# Patient Record
Sex: FEMALE | Race: WHITE | NOT HISPANIC OR LATINO | Employment: FULL TIME | URBAN - METROPOLITAN AREA
[De-identification: names, ages, dates, MRNs, and addresses within clinical notes are randomized per-mention and may not be internally consistent; named-entity substitution may affect disease eponyms.]

---

## 2017-01-03 ENCOUNTER — OCCUPATIONAL MEDICINE (OUTPATIENT)
Dept: OCCUPATIONAL MEDICINE | Facility: CLINIC | Age: 37
End: 2017-01-03
Payer: COMMERCIAL

## 2017-01-03 VITALS
DIASTOLIC BLOOD PRESSURE: 82 MMHG | RESPIRATION RATE: 16 BRPM | OXYGEN SATURATION: 94 % | BODY MASS INDEX: 28.25 KG/M2 | HEIGHT: 67 IN | WEIGHT: 180 LBS | HEART RATE: 102 BPM | TEMPERATURE: 98.2 F | SYSTOLIC BLOOD PRESSURE: 106 MMHG

## 2017-01-03 DIAGNOSIS — S40.011D CONTUSION OF RIGHT SHOULDER, SUBSEQUENT ENCOUNTER: ICD-10-CM

## 2017-01-03 DIAGNOSIS — S93.402D SPRAIN OF LEFT ANKLE, UNSPECIFIED LIGAMENT, SUBSEQUENT ENCOUNTER: ICD-10-CM

## 2017-01-03 DIAGNOSIS — S82.839A: ICD-10-CM

## 2017-01-03 PROCEDURE — 99213 OFFICE O/P EST LOW 20 MIN: CPT | Performed by: PREVENTIVE MEDICINE

## 2017-01-03 NOTE — Clinical Note
[facility] [place]   [address] [place] [address],   [address] [address]  Phone: [phone] - Fax: [phone]        Occupational Health [facility] Progress Report and Disability Certification  Date of Service: 1/3/2017   No Show:  No  Date / Time of Next Visit: 1/24/2017@2:20PM   Claim Information   Patient Name: Alondra Huerta  Claim Number:     Employer: [employer]  Date of Injury: 11/15/2016     Insurer / TPA: Workers Choice  ID / SSN:     Occupation: Pharmacy Tech  Diagnosis: Diagnoses of Closed avulsion fracture of distal end of fibula, Sprain of left ankle, unspecified ligament, subsequent encounter, and Contusion of right shoulder, subsequent encounter were pertinent to this visit.    Medical Information   Related to Industrial Injury? Yes    Subjective Complaints:  DOI 11/15/2016: Patient was walking down the hallway when she slipped on the floor that had water/ on it. She twisted her left ankle and fell on her right knee and right arm. X-ray showed small avulsion fracture lateral malleolus. Patient stat  es the pain continues to develop the same. Pain is worse  at the end of the day. Pain continues be worse on the medial aspect of the ankle. She continues to note swelling of the ankle. She continues to work light duty without difficulty.   Objective Findings: Left ankle: Mild swelling diffusely around the ankle. Tenderness to palpation medial aspect of ankle diffusely and lateral malleolus, worse medially. Decreased range of motion with plantar and dorsiflexion, pain with both movements.. Able to ambulate   with antalgic gait.   Pre-Existing Condition(s):     Assessment:   Condition Same    Status: Additional Care Required    Permanent Disability:No    Plan:      Diagnostics:      Comments:  Referral physical therapy  Referral to orthopedics  Continue Ortho boot  Continue restricted duty  Follow-up 3 weeks      Disability Information   Status: Released to Restricted  Duty    From:  1/3/2017  Through: 1/24/2017 Restrictions are: Temporary   Physical Restrictions   Sitting:    Standing:  < or = to 4 hrs/day Stooping:    Bending:      Squatting:    Walking:  < or = to 4 hrs/day Climbing:    Pushing:      Pulling:    Other:    Reaching Above Shoulder (L):   Reaching Above Shoulder (R):       Reaching Below Shoulder (L):    Reaching Below Shoulder (R):      Not to exceed Weight Limits   Carrying(hrs):   Weight Limit(lb):   Lifting(hrs):   Weight  Limit(lb):     Comments: Seated 50% of time. Allow to elevate and ice leg as needed        Repetitive Actions   Hands: i.e. Fine Manipulations from Grasping:     Feet: i.e. Operating Foot Controls:     Driving / Operate Machinery:     Physician Name: Talib Smith Physician Signature: TALIB Ross D.O. e-Signature: Dr. Pablo Marinelli, Medical Director   Clinic Name / Location: [address],   [address] [place] [address] Clinic Phone Number: Dept: [phone]   Appointment Time: 2:20 Pm Visit Start Time: 2:25 PM   Check-In Time:  2:18 Pm Visit Discharge Time:  @2:54PM   Original-Treating Physician or Chiropractor    Page 2-Insurer/TPA    Page 3-Employer    Page 4-Employee

## 2017-01-03 NOTE — MR AVS SNAPSHOT
"        Alondra Gutierrezjosseline   1/3/2017 2:20 PM   Occupational Medicine   MRN: [id number]    Department:  [facility]   Dept Phone:  [phone]    Description:  Female : 1980   Provider:  Talib Smith D.O.           Reason for Visit     Follow-Up room #3 (L) ankle, R arm doi 11/15/16 arm is better, ankle is same      Allergies as of 1/3/2017     Allergen Noted Reactions    Iodine 2011         You were diagnosed with     Closed avulsion fracture of distal end of fibula   [6502055]       Sprain of left ankle, unspecified ligament, subsequent encounter   [[id number]]       Contusion of right shoulder, subsequent encounter   [[id number]]         Vital Signs     Blood Pressure Pulse Temperature Respirations Height Weight    106/82 mmHg 102 36.8 °C (98.2 °F) 16 1.702 m (5' 7\") 81.647 kg (180 lb)    Body Mass Index Oxygen Saturation                28.19 kg/m2 94%          Basic Information     Date Of Birth Sex Race Ethnicity Preferred Language    1980 Female White Non- English      Your appointments     2017  2:20 PM   Workers Compensation with [facility] PROVIDER   [address] [address] [phone]              Health Maintenance        Date Due Completion Dates    IMM DTaP/Tdap/Td Vaccine (1 - Tdap) 3/20/1997 ---    PAP SMEAR 3/20/1999 ---            Current Immunizations     Influenza Vaccine Quad Inj (Pf) 10/25/2016, 10/24/2014    Influenza Vaccine Quad Inj (Preserved) 10/13/2015, 2014    MMR Vaccine 2014, 2014    Tuberculin Skin Test 2014, 2014      Below and/or attached are the medications your provider expects you to take. Review all of your home medications and newly ordered medications with your provider and/or pharmacist. Follow medication instructions as directed by your provider and/or pharmacist. Please keep your medication list with you and share with your provider. Update the " information when medications are discontinued, doses are changed, or new medications (including over-the-counter products) are added; and carry medication information at all times in the event of emergency situations     Allergies:  IODINE - (reactions not documented)               Medications  Valid as of: January 03, 2017 -  5:42 PM    Generic Name Brand Name Tablet Size Instructions for use    Ibuprofen (Cap) Ibuprofen 200 MG Take  by mouth.        .                 Medicines prescribed today were sent to:     [facility] [address] [facility], [place] - [address] [place] [address]    Phone: [phone] Fax: [phone]    Open 24 Hours?: No      Medication refill instructions:       If your prescription bottle indicates you have medication refills left, it is not necessary to call your provider’s office. Please contact your pharmacy and they will refill your medication.    If your prescription bottle indicates you do not have any refills left, you may request refills at any time through one of the following ways: The online [employer] system (except Urgent Care), by calling your provider’s office, or by asking your pharmacy to contact your provider’s office with a refill request. Medication refills are processed only during regular business hours and may not be available until the next business day. Your provider may request additional information or to have a follow-up visit with you prior to refilling your medication.   *Please Note: Medication refills are assigned a new Rx number when refilled electronically. Your pharmacy may indicate that no refills were authorized even though a new prescription for the same medication is available at the pharmacy. Please request the medicine by name with the pharmacy before contacting your provider for a refill.        Referral     A referral request has been sent to our patient care coordination department. Please allow 3-5 business days for us to process this request  and contact you either by phone or mail. If you do not hear from us by the 5th business day, please call us at [phone].           [employer]â„¢ Access Code: Activation code not generated  Current [employer]â„¢ Status: Active

## 2017-01-24 ENCOUNTER — OCCUPATIONAL MEDICINE (OUTPATIENT)
Dept: OCCUPATIONAL MEDICINE | Facility: CLINIC | Age: 37
End: 2017-01-24
Payer: COMMERCIAL

## 2017-01-24 VITALS
HEIGHT: 67 IN | WEIGHT: 180 LBS | SYSTOLIC BLOOD PRESSURE: 114 MMHG | OXYGEN SATURATION: 95 % | BODY MASS INDEX: 28.25 KG/M2 | HEART RATE: 100 BPM | DIASTOLIC BLOOD PRESSURE: 78 MMHG | TEMPERATURE: 97.7 F | RESPIRATION RATE: 16 BRPM

## 2017-01-24 DIAGNOSIS — S82.839A: ICD-10-CM

## 2017-01-24 DIAGNOSIS — S93.402D SPRAIN OF LEFT ANKLE, UNSPECIFIED LIGAMENT, SUBSEQUENT ENCOUNTER: ICD-10-CM

## 2017-01-24 PROCEDURE — 99214 OFFICE O/P EST MOD 30 MIN: CPT | Performed by: PREVENTIVE MEDICINE

## 2017-01-24 RX ORDER — ACETAMINOPHEN 325 MG/1
650 TABLET ORAL EVERY 4 HOURS PRN
COMMUNITY
End: 2019-03-11

## 2017-01-24 NOTE — Clinical Note
[address],   [address] [address]  Phone: [phone] - Fax: [phone]        [facility] Progress Report and Disability Certification  Date of Service: 1/24/2017   No Show:  No  Date / Time of Next Visit: 2/14/2017@2:30 PM   Claim Information   Patient Name: Alondra Huerta  Claim Number:     Employer: [employer]  Date of Injury: 11/15/2016     Insurer / TPA: Workers Choice  ID / SSN:     Occupation: Pharmacy Tech  Diagnosis: Diagnoses of Closed avulsion fracture of distal end of fibula and Sprain of left ankle, unspecified ligament, subsequent encounter were pertinent to this visit.    Medical Information   Related to Industrial Injury? Yes    Subjective Complaints:  DOI 11/15/2016: Patient was walking down the hallway when she slipped on the floor that had water/ on it. She twisted her left ankle and fell on her right knee and right arm. X-ray showed small avulsion fracture lateral malleolus. States that her right arm is improved. However she continues to have pain in her left ankle. The pain continues being more severe on the medial aspect of the ankle. She continues to use the orthopedic boot and has difficulty ambulating without it. Has appointment with orthopedics February 9.   Objective Findings: Left ankle: Minimal swelling diffusely around the ankle. Tenderness to palpation medial aspect of ankle diffusely and lateral malleolus, worse medially. Decreased range of motion with plantar and dorsiflexion, pain with both movements.. Able to ambulate with antalgic gait.   Pre-Existing Condition(s):     Assessment:   Condition Same    Status: Additional Care Required  Permanent Disability:No    Plan:      Diagnostics:      Comments:  Keep appointment for orthopedics  Continue using boot  OTC ibuprofen as needed  Continue restricted duty  Follow-up 3 weeks    Disability Information   Status: Released to Restricted Duty       From:  1/24/2017  Through: 2/14/2017 Restrictions are: Temporary   Physical Restrictions   Sitting:    Standing:  < or = to 4 hrs/day Stooping:    Bending:      Squatting:    Walking:  < or = to 4 hrs/day Climbing:    Pushing:      Pulling:    Other:    Reaching Above Shoulder (L):   Reaching Above Shoulder (R):       Reaching Below Shoulder (L):    Reaching Below Shoulder (R):      Not to exceed Weight Limits   Carrying(hrs):   Weight Limit(lb):   Lifting(hrs):   Weight  Limit(lb):     Comments: Seated 50% of time    Repetitive Actions   Hands: i.e. Fine Manipulations from Grasping:     Feet: i.e. Operating Foot Controls:     Driving / Operate Machinery:     Physician Name: Talib Smith D.O. Physician Signature: TALIB Ross D.O. e-Signature: Dr. Pablo Marinelli, Medical Director   Clinic Name / Location: [address],   [address] [place] [address] Clinic Phone Number: Dept: [phone]   Appointment Time: 2:20 Pm Visit Start Time: 2:25 PM   Check-In Time:  2:21 Pm Visit Discharge Time:  2:46 PM   Original-Treating Physician or Chiropractor    Page 2-Insurer/TPA    Page 3-Employer    Page 4-Employee

## 2017-01-24 NOTE — MR AVS SNAPSHOT
"        Alondra Huerta   2017 2:20 PM   Occupational Medicine   MRN: [id number]    Department:  [facility]   Dept Phone:  [phone]    Description:  Female : 1980   Provider:  Talib Smith D.O.           Reason for Visit     Follow-Up WC DOI 11/15/16 L ankle R arm same Rm 2      Allergies as of 2017     Allergen Noted Reactions    Iodine 2011         You were diagnosed with     Closed avulsion fracture of distal end of fibula   [[id number]]       Sprain of left ankle, unspecified ligament, subsequent encounter   [[id number]]         Vital Signs     Blood Pressure Pulse Temperature Respirations Height Weight    114/78 mmHg 100 36.5 °C (97.7 °F) 16 1.702 m (5' 7\") 81.647 kg (180 lb)    Body Mass Index Oxygen Saturation                28.19 kg/m2 95%          Basic Information     Date Of Birth Sex Race Ethnicity Preferred Language    1980 Female White Non- English      Your appointments     2017  2:30 PM   Workers Compensation with Talib Smith D.O.   [facility] [employer] [address] [address]   [phone]              Health Maintenance        Date Due Completion Dates    IMM DTaP/Tdap/Td Vaccine (1 - Tdap) 3/20/1997 ---    PAP SMEAR 3/20/1999 ---            Current Immunizations     Influenza Vaccine Quad Inj (Pf) 10/25/2016, 10/24/2014    Influenza Vaccine Quad Inj (Preserved) 10/13/2015, 2014    MMR Vaccine 2014, 2014    Tuberculin Skin Test 2014, 2014      Below and/or attached are the medications your provider expects you to take. Review all of your home medications and newly ordered medications with your provider and/or pharmacist. Follow medication instructions as directed by your provider and/or pharmacist. Please keep your medication list with you and share with your provider. Update the information when medications are discontinued, doses are changed, or new medications (including " over-the-counter products) are added; and carry medication information at all times in the event of emergency situations     Allergies:  IODINE - (reactions not documented)               Medications  Valid as of: January 24, 2017 -  2:46 PM    Generic Name Brand Name Tablet Size Instructions for use    Acetaminophen (Tab) TYLENOL 325 MG Take 650 mg by mouth every four hours as needed.        Ibuprofen (Cap) Ibuprofen 200 MG Take  by mouth.        .                 Medicines prescribed today were sent to:     [email or web address] [address] [facility], [place] - [address]    [address] [facility] [place] [address]    Phone: [phone] Fax: [phone]    Open 24 Hours?: No      Medication refill instructions:       If your prescription bottle indicates you have medication refills left, it is not necessary to call your provider’s office. Please contact your pharmacy and they will refill your medication.    If your prescription bottle indicates you do not have any refills left, you may request refills at any time through one of the following ways: The online [employer] system (except Urgent Care), by calling your provider’s office, or by asking your pharmacy to contact your provider’s office with a refill request. Medication refills are processed only during regular business hours and may not be available until the next business day. Your provider may request additional information or to have a follow-up visit with you prior to refilling your medication.   *Please Note: Medication refills are assigned a new Rx number when refilled electronically. Your pharmacy may indicate that no refills were authorized even though a new prescription for the same medication is available at the pharmacy. Please request the medicine by name with the pharmacy before contacting your provider for a refill.           [employer] Access Code: Activation code not generated  Current [employer] Status: Active

## 2017-01-24 NOTE — PROGRESS NOTES
"Subjective:      Alondra Huerta is a 36 y.o. female who presents with Follow-Up      DOI 11/15/2016: Patient was walking down the hallway when she slipped on the floor that had water/ on it. She twisted her left ankle and fell on her right knee and right arm. X-ray showed small avulsion fracture lateral malleolus. States that her right arm is improved. However she continues to have pain in her left ankle. The pain continues being more severe on the medial aspect of the ankle. She continues to use the orthopedic boot and has difficulty ambulating without it. Has appointment with orthopedics February 9.     HPI    ROS  PMH:  has no past medical history on file.  MEDS:   Current outpatient prescriptions:   •  acetaminophen (TYLENOL) 325 MG Tab, Take 650 mg by mouth every four hours as needed., Disp: , Rfl:   •  Ibuprofen 200 MG Cap, Take  by mouth., Disp: , Rfl:   ALLERGIES:   Allergies   Allergen Reactions   • Iodine      SURGHX:   Past Surgical History   Procedure Laterality Date   • Vaginal hysterectomy scope total  5/22/2009     Performed by SHELLY GUNDERSON at [facility]   • Salpingectomy  5/22/2009     Performed by SHELLY GUNDERSON at [facility]     SOCHX:    FH: family history is not on file.       Objective:     /78 mmHg  Pulse 100  Temp(Src) 36.5 °C (97.7 °F)  Resp 16  Ht 1.702 m (5' 7\")  Wt 81.647 kg (180 lb)  BMI 28.19 kg/m2  SpO2 95%     Physical Exam   Constitutional: She appears well-developed and well-nourished.   Cardiovascular: Normal rate.    Pulmonary/Chest: Effort normal.   Psychiatric: She has a normal mood and affect. Her behavior is normal.       Left ankle: Minimal swelling diffusely around the ankle. Tenderness to palpation medial aspect of ankle diffusely and lateral malleolus, worse medially. Decreased range of motion with plantar and dorsiflexion, pain with both movements.. Able to ambulate with antalgic gait.     "   Assessment/Plan:     1. Closed avulsion fracture of distal end of fibula    2. Sprain of left ankle, unspecified ligament, subsequent encounter  Keep appointment for orthopedics  Continue using boot  OTC ibuprofen as needed  Continue restricted duty  Follow-up 3 weeks

## 2017-03-03 ENCOUNTER — HOSPITAL ENCOUNTER (OUTPATIENT)
Dept: RADIOLOGY | Facility: MEDICAL CENTER | Age: 37
End: 2017-03-03
Attending: ORTHOPAEDIC SURGERY
Payer: COMMERCIAL

## 2017-03-03 DIAGNOSIS — M76.822 POSTERIOR TIBIAL TENDINITIS OF LEFT LEG: ICD-10-CM

## 2017-03-03 DIAGNOSIS — S93.492A TEAR OF TALOFIBULAR LIGAMENT OF LEFT LOWER EXTREMITY, INITIAL ENCOUNTER: ICD-10-CM

## 2017-03-03 PROCEDURE — 73721 MRI JNT OF LWR EXTRE W/O DYE: CPT | Mod: LT

## 2017-03-14 ENCOUNTER — HOSPITAL ENCOUNTER (OUTPATIENT)
Dept: PHYSICAL THERAPY | Facility: REHABILITATION | Age: 37
End: 2017-03-14
Attending: PREVENTIVE MEDICINE
Payer: COMMERCIAL

## 2017-03-14 PROCEDURE — 97161 PT EVAL LOW COMPLEX 20 MIN: CPT

## 2017-03-14 PROCEDURE — 97140 MANUAL THERAPY 1/> REGIONS: CPT

## 2017-03-14 PROCEDURE — 97014 ELECTRIC STIMULATION THERAPY: CPT

## 2017-03-16 ENCOUNTER — HOSPITAL ENCOUNTER (OUTPATIENT)
Dept: PHYSICAL THERAPY | Facility: REHABILITATION | Age: 37
End: 2017-03-16
Attending: PREVENTIVE MEDICINE
Payer: COMMERCIAL

## 2017-03-16 PROCEDURE — 97110 THERAPEUTIC EXERCISES: CPT

## 2017-03-16 PROCEDURE — 97140 MANUAL THERAPY 1/> REGIONS: CPT

## 2017-03-16 PROCEDURE — 97014 ELECTRIC STIMULATION THERAPY: CPT

## 2017-03-20 ENCOUNTER — HOSPITAL ENCOUNTER (OUTPATIENT)
Dept: PHYSICAL THERAPY | Facility: REHABILITATION | Age: 37
End: 2017-03-20
Attending: PREVENTIVE MEDICINE
Payer: COMMERCIAL

## 2017-03-20 PROCEDURE — 97110 THERAPEUTIC EXERCISES: CPT

## 2017-03-20 PROCEDURE — 97140 MANUAL THERAPY 1/> REGIONS: CPT

## 2017-03-20 PROCEDURE — 97014 ELECTRIC STIMULATION THERAPY: CPT

## 2017-03-22 ENCOUNTER — HOSPITAL ENCOUNTER (OUTPATIENT)
Dept: PHYSICAL THERAPY | Facility: REHABILITATION | Age: 37
End: 2017-03-22
Attending: PREVENTIVE MEDICINE
Payer: COMMERCIAL

## 2017-03-22 PROCEDURE — 97140 MANUAL THERAPY 1/> REGIONS: CPT

## 2017-03-22 PROCEDURE — 97014 ELECTRIC STIMULATION THERAPY: CPT

## 2017-03-22 PROCEDURE — 97110 THERAPEUTIC EXERCISES: CPT

## 2017-03-27 ENCOUNTER — HOSPITAL ENCOUNTER (OUTPATIENT)
Dept: PHYSICAL THERAPY | Facility: REHABILITATION | Age: 37
End: 2017-03-27
Attending: PREVENTIVE MEDICINE
Payer: COMMERCIAL

## 2017-03-27 PROCEDURE — 97110 THERAPEUTIC EXERCISES: CPT

## 2017-03-27 PROCEDURE — 97014 ELECTRIC STIMULATION THERAPY: CPT

## 2017-03-30 ENCOUNTER — HOSPITAL ENCOUNTER (OUTPATIENT)
Dept: PHYSICAL THERAPY | Facility: REHABILITATION | Age: 37
End: 2017-03-30
Attending: PREVENTIVE MEDICINE
Payer: COMMERCIAL

## 2017-03-30 PROCEDURE — 97110 THERAPEUTIC EXERCISES: CPT

## 2017-03-30 PROCEDURE — 97140 MANUAL THERAPY 1/> REGIONS: CPT

## 2017-04-06 ENCOUNTER — HOSPITAL ENCOUNTER (OUTPATIENT)
Dept: PHYSICAL THERAPY | Facility: REHABILITATION | Age: 37
End: 2017-04-06
Attending: PREVENTIVE MEDICINE
Payer: COMMERCIAL

## 2017-04-06 PROCEDURE — 97014 ELECTRIC STIMULATION THERAPY: CPT

## 2017-04-06 PROCEDURE — 97035 APP MDLTY 1+ULTRASOUND EA 15: CPT

## 2017-04-06 PROCEDURE — 97110 THERAPEUTIC EXERCISES: CPT

## 2017-04-13 ENCOUNTER — HOSPITAL ENCOUNTER (OUTPATIENT)
Dept: PHYSICAL THERAPY | Facility: REHABILITATION | Age: 37
End: 2017-04-13
Attending: PREVENTIVE MEDICINE
Payer: COMMERCIAL

## 2017-04-13 PROCEDURE — 97014 ELECTRIC STIMULATION THERAPY: CPT

## 2017-04-13 PROCEDURE — 97110 THERAPEUTIC EXERCISES: CPT

## 2017-04-18 ENCOUNTER — HOSPITAL ENCOUNTER (OUTPATIENT)
Dept: PHYSICAL THERAPY | Facility: REHABILITATION | Age: 37
End: 2017-04-18
Attending: PREVENTIVE MEDICINE
Payer: COMMERCIAL

## 2017-04-18 PROCEDURE — 97014 ELECTRIC STIMULATION THERAPY: CPT

## 2017-04-18 PROCEDURE — 97140 MANUAL THERAPY 1/> REGIONS: CPT

## 2017-04-18 PROCEDURE — 97110 THERAPEUTIC EXERCISES: CPT

## 2017-04-20 ENCOUNTER — HOSPITAL ENCOUNTER (OUTPATIENT)
Dept: PHYSICAL THERAPY | Facility: REHABILITATION | Age: 37
End: 2017-04-20
Attending: PREVENTIVE MEDICINE
Payer: COMMERCIAL

## 2017-04-20 PROCEDURE — 97140 MANUAL THERAPY 1/> REGIONS: CPT

## 2017-04-20 PROCEDURE — 97110 THERAPEUTIC EXERCISES: CPT

## 2017-04-20 PROCEDURE — 97014 ELECTRIC STIMULATION THERAPY: CPT

## 2017-04-24 ENCOUNTER — APPOINTMENT (OUTPATIENT)
Dept: PHYSICAL THERAPY | Facility: REHABILITATION | Age: 37
End: 2017-04-24
Attending: PREVENTIVE MEDICINE
Payer: COMMERCIAL

## 2017-04-27 ENCOUNTER — APPOINTMENT (OUTPATIENT)
Dept: PHYSICAL THERAPY | Facility: REHABILITATION | Age: 37
End: 2017-04-27
Attending: PREVENTIVE MEDICINE
Payer: COMMERCIAL

## 2017-05-11 ENCOUNTER — APPOINTMENT (OUTPATIENT)
Dept: ADMISSIONS | Facility: MEDICAL CENTER | Age: 37
End: 2017-05-11
Attending: ORTHOPAEDIC SURGERY
Payer: COMMERCIAL

## 2017-05-11 RX ORDER — LORATADINE 10 MG/1
10 TABLET ORAL
COMMUNITY
End: 2019-03-11

## 2017-05-11 RX ORDER — FLUTICASONE PROPIONATE 50 MCG
1 SPRAY, SUSPENSION (ML) NASAL PRN
COMMUNITY
End: 2019-03-11

## 2017-05-15 ENCOUNTER — HOSPITAL ENCOUNTER (OUTPATIENT)
Facility: MEDICAL CENTER | Age: 37
End: 2017-05-15
Attending: ORTHOPAEDIC SURGERY | Admitting: ORTHOPAEDIC SURGERY
Payer: COMMERCIAL

## 2017-05-15 VITALS
HEIGHT: 67 IN | BODY MASS INDEX: 28.17 KG/M2 | HEART RATE: 87 BPM | RESPIRATION RATE: 14 BRPM | OXYGEN SATURATION: 94 % | TEMPERATURE: 98.2 F | WEIGHT: 179.45 LBS

## 2017-05-15 PROBLEM — M76.829 TIBIALIS POSTERIOR TENDONITIS: Status: ACTIVE | Noted: 2017-05-15

## 2017-05-15 PROBLEM — S93.402A SPRAIN OF LEFT ANKLE: Status: ACTIVE | Noted: 2017-05-15

## 2017-05-15 PROCEDURE — 160002 HCHG RECOVERY MINUTES (STAT): Performed by: ORTHOPAEDIC SURGERY

## 2017-05-15 PROCEDURE — 501838 HCHG SUTURE GENERAL: Performed by: ORTHOPAEDIC SURGERY

## 2017-05-15 PROCEDURE — 500881 HCHG PACK, EXTREMITY: Performed by: ORTHOPAEDIC SURGERY

## 2017-05-15 PROCEDURE — 160029 HCHG SURGERY MINUTES - 1ST 30 MINS LEVEL 4: Performed by: ORTHOPAEDIC SURGERY

## 2017-05-15 PROCEDURE — 160046 HCHG PACU - 1ST 60 MINS PHASE II: Performed by: ORTHOPAEDIC SURGERY

## 2017-05-15 PROCEDURE — 160022 HCHG BLOCK: Performed by: ORTHOPAEDIC SURGERY

## 2017-05-15 PROCEDURE — 160035 HCHG PACU - 1ST 60 MINS PHASE I: Performed by: ORTHOPAEDIC SURGERY

## 2017-05-15 PROCEDURE — A6402 STERILE GAUZE <= 16 SQ IN: HCPCS | Performed by: ORTHOPAEDIC SURGERY

## 2017-05-15 PROCEDURE — 700101 HCHG RX REV CODE 250

## 2017-05-15 PROCEDURE — 160009 HCHG ANES TIME/MIN: Performed by: ORTHOPAEDIC SURGERY

## 2017-05-15 PROCEDURE — 700102 HCHG RX REV CODE 250 W/ 637 OVERRIDE(OP)

## 2017-05-15 PROCEDURE — A6454 SELF-ADHER BAND W>=3" <5"/YD: HCPCS | Performed by: ORTHOPAEDIC SURGERY

## 2017-05-15 PROCEDURE — 500088 HCHG BLADE, SAGITTAL: Performed by: ORTHOPAEDIC SURGERY

## 2017-05-15 PROCEDURE — 160048 HCHG OR STATISTICAL LEVEL 1-5: Performed by: ORTHOPAEDIC SURGERY

## 2017-05-15 PROCEDURE — 502240 HCHG MISC OR SUPPLY RC 0272: Performed by: ORTHOPAEDIC SURGERY

## 2017-05-15 PROCEDURE — 160025 RECOVERY II MINUTES (STATS): Performed by: ORTHOPAEDIC SURGERY

## 2017-05-15 PROCEDURE — 501336 HCHG SHAVER: Performed by: ORTHOPAEDIC SURGERY

## 2017-05-15 PROCEDURE — 700111 HCHG RX REV CODE 636 W/ 250 OVERRIDE (IP)

## 2017-05-15 PROCEDURE — 160041 HCHG SURGERY MINUTES - EA ADDL 1 MIN LEVEL 4: Performed by: ORTHOPAEDIC SURGERY

## 2017-05-15 PROCEDURE — 160036 HCHG PACU - EA ADDL 30 MINS PHASE I: Performed by: ORTHOPAEDIC SURGERY

## 2017-05-15 PROCEDURE — A6223 GAUZE >16<=48 NO W/SAL W/O B: HCPCS | Performed by: ORTHOPAEDIC SURGERY

## 2017-05-15 PROCEDURE — 160047 HCHG PACU  - EA ADDL 30 MINS PHASE II: Performed by: ORTHOPAEDIC SURGERY

## 2017-05-15 PROCEDURE — 500423 HCHG DRESSING, ABD COMBINE: Performed by: ORTHOPAEDIC SURGERY

## 2017-05-15 PROCEDURE — 500054 HCHG BANDAGE, ELASTIC 6: Performed by: ORTHOPAEDIC SURGERY

## 2017-05-15 PROCEDURE — A9270 NON-COVERED ITEM OR SERVICE: HCPCS

## 2017-05-15 PROCEDURE — 502000 HCHG MISC OR IMPLANTS RC 0278: Performed by: ORTHOPAEDIC SURGERY

## 2017-05-15 DEVICE — SUTURE ANCHOR TWINFIX 3.5 WITH NEEDLES SMALL JOINT: Type: IMPLANTABLE DEVICE | Status: FUNCTIONAL

## 2017-05-15 RX ORDER — LIDOCAINE AND PRILOCAINE 25; 25 MG/G; MG/G
1 CREAM TOPICAL
Status: DISCONTINUED | OUTPATIENT
Start: 2017-05-15 | End: 2017-05-15

## 2017-05-15 RX ORDER — LIDOCAINE HYDROCHLORIDE 10 MG/ML
0.5 INJECTION, SOLUTION INFILTRATION; PERINEURAL
Status: DISCONTINUED | OUTPATIENT
Start: 2017-05-15 | End: 2017-05-15

## 2017-05-15 RX ORDER — LIDOCAINE HYDROCHLORIDE 10 MG/ML
0.5 INJECTION, SOLUTION INFILTRATION; PERINEURAL
Status: COMPLETED | OUTPATIENT
Start: 2017-05-15 | End: 2017-05-15

## 2017-05-15 RX ORDER — MAGNESIUM HYDROXIDE 1200 MG/15ML
LIQUID ORAL
Status: DISCONTINUED | OUTPATIENT
Start: 2017-05-15 | End: 2017-05-15 | Stop reason: HOSPADM

## 2017-05-15 RX ORDER — MEPERIDINE HYDROCHLORIDE 25 MG/ML
INJECTION INTRAMUSCULAR; INTRAVENOUS; SUBCUTANEOUS
Status: COMPLETED
Start: 2017-05-15 | End: 2017-05-15

## 2017-05-15 RX ORDER — SODIUM CHLORIDE, SODIUM LACTATE, POTASSIUM CHLORIDE, CALCIUM CHLORIDE 600; 310; 30; 20 MG/100ML; MG/100ML; MG/100ML; MG/100ML
INJECTION, SOLUTION INTRAVENOUS CONTINUOUS
Status: DISCONTINUED | OUTPATIENT
Start: 2017-05-15 | End: 2017-05-15

## 2017-05-15 RX ORDER — LIDOCAINE HYDROCHLORIDE 10 MG/ML
INJECTION, SOLUTION INFILTRATION; PERINEURAL
Status: COMPLETED
Start: 2017-05-15 | End: 2017-05-15

## 2017-05-15 RX ORDER — SODIUM CHLORIDE, SODIUM LACTATE, POTASSIUM CHLORIDE, CALCIUM CHLORIDE 600; 310; 30; 20 MG/100ML; MG/100ML; MG/100ML; MG/100ML
INJECTION, SOLUTION INTRAVENOUS CONTINUOUS
Status: DISCONTINUED | OUTPATIENT
Start: 2017-05-15 | End: 2017-05-15 | Stop reason: HOSPADM

## 2017-05-15 RX ORDER — OXYCODONE HCL 5 MG/5 ML
SOLUTION, ORAL ORAL
Status: COMPLETED
Start: 2017-05-15 | End: 2017-05-15

## 2017-05-15 RX ORDER — LIDOCAINE AND PRILOCAINE 25; 25 MG/G; MG/G
1 CREAM TOPICAL
Status: COMPLETED | OUTPATIENT
Start: 2017-05-15 | End: 2017-05-15

## 2017-05-15 RX ADMIN — LIDOCAINE HYDROCHLORIDE 0.5 ML: 10 INJECTION, SOLUTION INFILTRATION; PERINEURAL at 06:16

## 2017-05-15 RX ADMIN — SODIUM CHLORIDE, SODIUM LACTATE, POTASSIUM CHLORIDE, CALCIUM CHLORIDE: 600; 310; 30; 20 INJECTION, SOLUTION INTRAVENOUS at 06:16

## 2017-05-15 RX ADMIN — MEPERIDINE HYDROCHLORIDE 12.5 MG: 25 INJECTION INTRAMUSCULAR; INTRAVENOUS; SUBCUTANEOUS at 08:21

## 2017-05-15 RX ADMIN — OXYCODONE HYDROCHLORIDE 5 MG: 5 SOLUTION ORAL at 08:22

## 2017-05-15 ASSESSMENT — PAIN SCALES - GENERAL
PAINLEVEL_OUTOF10: 0
PAINLEVEL_OUTOF10: 6
PAINLEVEL_OUTOF10: 0

## 2017-05-15 NOTE — IP AVS SNAPSHOT
5/15/2017    Alondra Huerta  [address] [facility] [address]    Dear Alondra:    [facility] wants to ensure your discharge home is safe and you or your loved ones have had all of your questions answered regarding your care after you leave the hospital.    Below is a list of resources and contact information should you have any questions regarding your hospital stay, follow-up instructions, or active medical symptoms.    Questions or Concerns Regarding… Contact   Medical Questions Related to Your Discharge  (7 days a week, 8am-5pm) Contact a Nurse Care Coordinator   [phone]   Medical Questions Not Related to Your Discharge  (24 hours a day / 7 days a week)  Contact the Nurse Health Line   [phone]    Medications or Discharge Instructions Refer to your discharge packet   or contact your [facility] Primary Care Provider   [phone]   Follow-up Appointment(s) Schedule your appointment via [employer]   or contact Scheduling [phone]   Billing Review your statement via [employer]  or contact Billing [phone]   Medical Records Review your records via [employer]   or contact Medical Records [phone]     You may receive a telephone call within two days of discharge. This call is to make certain you understand your discharge instructions and have the opportunity to have any questions answered. You can also easily access your medical information, test results and upcoming appointments via the [employer] free online health management tool. You can learn more and sign up at [employer]lu[employer]. For assistance setting up your [employer] account, please call [phone].    Once again, we want to ensure your discharge home is safe and that you have a clear understanding of any next steps in your care. If you have any questions or concerns, please do not hesitate to contact us, we are here for you. Thank you for choosing [facility] for your healthcare needs.    Sincerely,    Your [facility] Healthcare Team

## 2017-05-15 NOTE — OP REPORT
DATE OF SERVICE:  05/15/2017    DATE OF OPERATION:  05/15/2017    PREOPERATIVE DIAGNOSES:  1.  Left ankle arthrofibrosis.  2.  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    POSTOPERATIVE DIAGNOSES:  1.  Left ankle arthrofibrosis.  2.  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    PROCEDURES PERFORMED:  1.  Left ankle arthroscopic extensive debridement.  2.  Left secondary lateral ligament reconstruction.    SURGEON:  Vaibhav Casper MD    FIRST ASSISTANT:  Zoraida Falk MD    SECOND ASSISTANT:  Dian Mccann.    ANESTHESIA:  General endotracheal with popliteal block per request for   postoperative pain management.    ESTIMATED BLOOD LOSS:  None.    COMPLICATIONS:  None.    POSTOPERATIVE PLAN:  1.  Weightbearing as tolerated.  2.  Preoperative antibiotics.  3.  Follow up in 2 weeks.    INDICATIONS:  The patient is a pleasant 37-year-old female who has had   problems with her left ankle.  This is an industrial injury.  The above   diagnoses made and options were discussed with her including operative and   nonoperative.  She elected to undergo operative intervention.  The above   procedure discussed and all questions were answered.  Risks of surgery   explained, which include but not limited to wound problems, infection, nerve   injury, vascular injury, need for further surgery.  She understands she could   have persistent risk for pain, malunion, nonunion, need for further surgery.    She understands and accepts these risks and agrees to proceed.  Site was   marked by myself prior to receiving psychotropic medicines.    PROCEDURE IN DETAIL:  The patient was given a popliteal block per request for   postoperative pain management.  She was brought into the operating room and   underwent general endotracheal anesthesia without complications.  Her left   lower extremity was prepped and draped in standard fashion.  Positive site   verification confirmed her left lower  extremity as well as procedure and   confirmation that she received preoperative antibiotics.  Esmarch was used to   exsanguinate her foot and ankle and leg tourniquet was inflated to 250 mmHg.    An 18-gauge needle was placed medial to the preoperative marked tibialis   anterior above the joint line.  Ankle was insufflated with 10 mL of normal   saline.  A 15 blade was used to make full thickness arthrotomy and a 4.0 scope   was introduced.  An 18-gauge needle was then placed lateral to the   preoperative peroneus tertius at the level of the joint line.  Skin incision   was made and blunt dissection made down into the joint.  Shaver was   introduced.  It was debrided out the anterolateral corner.  She has some   extensive arthrofibrosis, synovitis and some plica.  This was debrided out.    All instruments were removed.  The scope was then placed in the lateral   portal.  Shaver was introduced in the anteromedial corner.  This debrided this   out as well as some fibrous tissue that was down in the medial gutter.  Probe   palpation of the entire cartilage demonstrated no loose bodies, no cartilage   defects.  She had normal cartilage.  All instruments were removed.  Scope   portals were closed with interrupted nylon suture.  A sterile Tegaderm was   placed.  Next, she was repositioned, prepped and draped in the lateral   position, left side up.  A curvilinear incision was made at the distal aspect   of the fibula, carefully dissected down to the fibular periosteum.  The   peroneal tendon sheath was opened up just distal to the SPR, exposing the   peroneal tendons.  These were retracted posteriorly.  CFL ligament was then   released off its superior and inferior margins directed off of the fibula.    The ATFL was then identified and released off its superior and inferior   margins directed off of the fibula.  Fibular periosteum was elevated up and   rongeur was used to rongeur down to good subchondral bone.  A 3.5  corkscrew   anchor was placed at the junction of the ATFL and CFL ligament.  One pair of   suture was brought to the ATFL ligament, the other was brought to the CFL   ligament.  The foot was then held in plantar flexion, eversion, and CFL   ligament was pretensioned and tied.  The foot was then brought into   dorsiflexion, eversion and the ATFL ligament was pretensioned and tied.  The   fibular periosteum was then repaired to the extensor retinaculum with multiple   0 Vicryls in a mattress type fashion for secondary repair.  The patient had   excellent stability.  Wounds were irrigated with copious irrigation and closed   in layered fashion using 3-0 Vicryls and 3-0 nylon.  Sterile dressing was   applied.  Tourniquet was released.  All toes were pink.  The patient was   transferred to the recovery room in good condition.    Utilization of Dr. Falk was necessary for patient positioning, holding,   retracting, provisional and definitive fixation, wound closure, dressing and   splint placement.  She was present throughout the entire procedure.       ____________________________________     MD DANYELLE MANZANO / NTS    DD:  05/15/2017 11:02:02  DT:  05/15/2017 13:38:00    D#:  [id number]  Job#:  [id number]    cc: [facility]

## 2017-05-15 NOTE — OR NURSING
Message left for pt's  regarding update and plan of care, called reception and they will update him and alert him to message.  Spoke with Dr. Casper regarding brace noted in Discharge Instructions, he said that pt can walk on current dressing and that brace will be given on follow-up appointment.

## 2017-05-15 NOTE — IP AVS SNAPSHOT
" Home Care Instructions                                                                                                                Name:Alondra Huerta  Medical Record Number:[id number]  CSN: [id number]    YOB: 1980   Age: 37 y.o.  Sex: female  HT:1.702 m (5' 7\") WT: 81.4 kg (179 lb 7.3 oz)          Admit Date: 5/15/2017     Discharge Date:   Today's Date: 5/15/2017  Attending Doctor:  Vaibhav Casper M.D.                  Allergies:  Iodine and Shellfish allergy              Follow-up Information     1. Follow up with Vaibhav Casper M.D. In 2 weeks.    Specialty:  Orthopaedics    Contact information    [address]  [address]  [facility] [address]  [phone]          Discharge Instructions         ACTIVITY: Rest and take it easy for the first 24 hours.  A responsible adult is recommended to remain with you during that time.  It is normal to feel sleepy.  We encourage you to not do anything that requires balance, judgment or coordination.    MILD FLU-LIKE SYMPTOMS ARE NORMAL. YOU MAY EXPERIENCE GENERALIZED MUSCLE ACHES, THROAT IRRITATION, HEADACHE AND/OR SOME NAUSEA.    FOR 24 HOURS DO NOT:  Drive, operate machinery or run household appliances.  Drink beer or alcoholic beverages.   Make important decisions or sign legal documents.    SPECIAL INSTRUCTIONS:     Elevate/ice surgical leg  Take pain medications scheduled until block wears off. Hold for sedation.   Rewrap ace looser 6-8 hours post op   Weightbearing as tolerated in brace            DIET: To avoid nausea, slowly advance diet as tolerated, avoiding spicy or greasy foods for the first day.  Add more substantial food to your diet according to your physician's instructions.  Babies can be fed formula or breast milk as soon as they are hungry.  INCREASE FLUIDS AND FIBER TO AVOID CONSTIPATION.    SURGICAL DRESSING/BATHING: *follow as directed by **    FOLLOW-UP APPOINTMENT:  A follow-up appointment should be arranged with your doctor as " scheduled.    You should CALL YOUR PHYSICIAN if you develop:  Fever greater than 101 degrees F.  Pain not relieved by medication, or persistent nausea or vomiting.  Excessive bleeding (blood soaking through dressing) or unexpected drainage from the wound.  Extreme redness or swelling around the incision site, drainage of pus or foul smelling drainage.  Inability to urinate or empty your bladder within 8 hours.  Problems with breathing or chest pain.    You should call 911 if you develop problems with breathing or chest pain.  If you are unable to contact your doctor or surgical center, you should go to the nearest emergency room or urgent care center.  Physician's telephone #: *[phone]*    If any questions arise, call your doctor.  If your doctor is not available, please feel free to call the Surgical Center at [phone].  The Center is open Monday through Friday from 7AM to 7PM.  You can also call the [employer] HOTLINE open 24 hours/day, 7 days/week and speak to a nurse at [phone], or toll free at [phone].    A registered nurse may call you a few days after your surgery to see how you are doing after your procedure.    MEDICATIONS: Resume taking daily medication.  Take prescribed pain medication with food.  If no medication is prescribed, you may take non-aspirin pain medication if needed.  PAIN MEDICATION CAN BE VERY CONSTIPATING.  Take a stool softener or laxative such as senokot, pericolace, or milk of magnesia if needed.    Patient has pain prescription filled and at home.  Last pain medication given at *8:22 am*.    If your physician has prescribed pain medication that includes Acetaminophen (Tylenol), do not take additional Acetaminophen (Tylenol) while taking the prescribed medication.    Depression / Suicide Risk    As you are discharged from this [facility] facility, it is important to learn how to keep safe from harming yourself.    Recognize the warning signs:  · Abrupt changes in  personality, positive or negative- including increase in energy   · Giving away possessions  · Change in eating patterns- significant weight changes-  positive or negative  · Change in sleeping patterns- unable to sleep or sleeping all the time   · Unwillingness or inability to communicate  · Depression  · Unusual sadness, discouragement and loneliness  · Talk of wanting to die  · Neglect of personal appearance   · Rebelliousness- reckless behavior  · Withdrawal from people/activities they love  · Confusion- inability to concentrate     If you or a loved one observes any of these behaviors or has concerns about self-harm, here's what you can do:  · Talk about it- your feelings and reasons for harming yourself  · Remove any means that you might use to hurt yourself (examples: pills, rope, extension cords, firearm)  · Get professional help from the community (Mental Health, Substance Abuse, psychological counseling)  · Do not be alone:Call your Safe Contact- someone whom you trust who will be there for you.  · Call your local CRISIS HOTLINE 141-2394 or 981-017-1973  · Call your local Children's Mobile Crisis Response Team [place] (198) 475-3278 or www.[employer]  · Call the toll free National Suicide Prevention Hotlines   · National Suicide Prevention Lifeline 885-412-LTDQ (6365)  · National Hope Line Network 800-SUICIDE (880-3848)       Medication List      CONTINUE taking these medications        Instructions    Morning Afternoon Evening Bedtime    asa/apap/caffeine 250-250-65 MG Tabs   Commonly known as:  EXCEDRIN        Take 1 Tab by mouth every 6 hours as needed for Headache.   Dose:  1 Tab                        fluticasone 50 MCG/ACT nasal spray   Commonly known as:  FLONASE        Spray 1 Spray in nose as needed.   Dose:  1 Spray                        Ibuprofen 200 MG Caps        Take 200 mg by mouth as needed.   Dose:  200 mg                        loratadine 10 MG Tabs   Commonly known as:   CLARITIN        Take 10 mg by mouth 1 time daily as needed.   Dose:  10 mg                        TYLENOL 325 MG Tabs   Generic drug:  acetaminophen        Take 650 mg by mouth every four hours as needed.   Dose:  650 mg                                Medication Information     Above and/or attached are the medications your physician expects you to take upon discharge. Review all of your home medications and newly ordered medications with your doctor and/or pharmacist. Follow medication instructions as directed by your doctor and/or pharmacist. Please keep your medication list with you and share with your physician. Update the information when medications are discontinued, doses are changed, or new medications (including over-the-counter products) are added; and carry medication information at all times in the event of emergency situations.        Resources     Quit Smoking / Tobacco Use:    I understand the use of any tobacco products increases my chance of suffering from future heart disease or stroke and could cause other illnesses which may shorten my life. Quitting the use of tobacco products is the single most important thing I can do to improve my health. For further information on smoking / tobacco cessation call a Toll Free Quit Line at 1-900.773.6431 (*National [employer] [place]) or 1-141.488.4726 (American Lung Association) or you can access the web based program at www.lungusa.org.    [place] Tobacco Users Help Line:  (813) 708-8297       Toll Free: 1-232.317.1757  Quit Tobacco Program [facility] (945)743-7329    Crisis Hotline:    [place] Crisis Hotline:  8-141-YHUYKTS or 1-206.464.4131    [place] Crisis Hotline:    1-562.623.3740 or 020-200-4322    Discharge Survey:   Thank you for choosing [facility]. We hope we did everything we could to make your hospital stay a pleasant one. You may be receiving a survey and we would appreciate your time and participation in answering the  questions. Your input is very valuable to us in our efforts to improve our service to our patients and their families.            Signatures     My signature on this form indicates that:    1. I acknowledge receipt and understanding of these Home Care Instruction.  2. My questions regarding this information have been answered to my satisfaction.  3. I have formulated a plan with my discharge nurse to obtain my prescribed medications for home.    __________________________________      __________________________________                   Patient Signature                                 Guardian/Responsible Adult Signature      __________________________________                 __________       ________                       Nurse Signature                                               Date                 Time

## 2017-05-15 NOTE — DISCHARGE INSTRUCTIONS
ACTIVITY: Rest and take it easy for the first 24 hours.  A responsible adult is recommended to remain with you during that time.  It is normal to feel sleepy.  We encourage you to not do anything that requires balance, judgment or coordination.    MILD FLU-LIKE SYMPTOMS ARE NORMAL. YOU MAY EXPERIENCE GENERALIZED MUSCLE ACHES, THROAT IRRITATION, HEADACHE AND/OR SOME NAUSEA.    FOR 24 HOURS DO NOT:  Drive, operate machinery or run household appliances.  Drink beer or alcoholic beverages.   Make important decisions or sign legal documents.    SPECIAL INSTRUCTIONS:     Elevate/ice surgical leg  Take pain medications scheduled until block wears off. Hold for sedation.   Rewrap ace looser 6-8 hours post op   Weightbearing as tolerated in brace            DIET: To avoid nausea, slowly advance diet as tolerated, avoiding spicy or greasy foods for the first day.  Add more substantial food to your diet according to your physician's instructions.  Babies can be fed formula or breast milk as soon as they are hungry.  INCREASE FLUIDS AND FIBER TO AVOID CONSTIPATION.    SURGICAL DRESSING/BATHING: *follow as directed by **    FOLLOW-UP APPOINTMENT:  A follow-up appointment should be arranged with your doctor as scheduled.    You should CALL YOUR PHYSICIAN if you develop:  Fever greater than 101 degrees F.  Pain not relieved by medication, or persistent nausea or vomiting.  Excessive bleeding (blood soaking through dressing) or unexpected drainage from the wound.  Extreme redness or swelling around the incision site, drainage of pus or foul smelling drainage.  Inability to urinate or empty your bladder within 8 hours.  Problems with breathing or chest pain.    You should call 911 if you develop problems with breathing or chest pain.  If you are unable to contact your doctor or surgical center, you should go to the nearest emergency room or urgent care center.  Physician's telephone #: *[phone]*    If any questions arise,  call your doctor.  If your doctor is not available, please feel free to call the Surgical Center at [phone].  The Center is open Monday through Friday from 7AM to 7PM.  You can also call the HEALTH HOTLINE open 24 hours/day, 7 days/week and speak to a nurse at [phone], or toll free at [phone].    A registered nurse may call you a few days after your surgery to see how you are doing after your procedure.    MEDICATIONS: Resume taking daily medication.  Take prescribed pain medication with food.  If no medication is prescribed, you may take non-aspirin pain medication if needed.  PAIN MEDICATION CAN BE VERY CONSTIPATING.  Take a stool softener or laxative such as senokot, pericolace, or milk of magnesia if needed.    Patient has pain prescription filled and at home.  Last pain medication given at *8:22 am*.    If your physician has prescribed pain medication that includes Acetaminophen (Tylenol), do not take additional Acetaminophen (Tylenol) while taking the prescribed medication.    Depression / Suicide Risk    As you are discharged from this [facility] facility, it is important to learn how to keep safe from harming yourself.    Recognize the warning signs:  · Abrupt changes in personality, positive or negative- including increase in energy   · Giving away possessions  · Change in eating patterns- significant weight changes-  positive or negative  · Change in sleeping patterns- unable to sleep or sleeping all the time   · Unwillingness or inability to communicate  · Depression  · Unusual sadness, discouragement and loneliness  · Talk of wanting to die  · Neglect of personal appearance   · Rebelliousness- reckless behavior  · Withdrawal from people/activities they love  · Confusion- inability to concentrate     If you or a loved one observes any of these behaviors or has concerns about self-harm, here's what you can do:  · Talk about it- your feelings and reasons for harming yourself  · Remove  any means that you might use to hurt yourself (examples: pills, rope, extension cords, firearm)  · Get professional help from the community (Mental Health, Substance Abuse, psychological counseling)  · Do not be alone:Call your Safe Contact- someone whom you trust who will be there for you.  · Call your local CRISIS HOTLINE 809-1292 or 399-254-9479  · Call your local Children's Mobile Crisis Response Team [place] (803) 798-5634 or www.[employer]  · Call the toll free National Suicide Prevention Hotlines   · National Suicide Prevention Lifeline 029-538-OQAK (0487)  · National Hope Line Network 800-SUICIDE (079-9167)

## 2017-05-31 ENCOUNTER — HOSPITAL ENCOUNTER (OUTPATIENT)
Dept: PHYSICAL THERAPY | Facility: REHABILITATION | Age: 37
End: 2017-05-31
Attending: ORTHOPAEDIC SURGERY
Payer: COMMERCIAL

## 2017-05-31 PROCEDURE — 97161 PT EVAL LOW COMPLEX 20 MIN: CPT

## 2017-05-31 PROCEDURE — 97112 NEUROMUSCULAR REEDUCATION: CPT

## 2017-06-05 ENCOUNTER — HOSPITAL ENCOUNTER (OUTPATIENT)
Dept: PHYSICAL THERAPY | Facility: REHABILITATION | Age: 37
End: 2017-06-05
Attending: ORTHOPAEDIC SURGERY
Payer: COMMERCIAL

## 2017-06-05 PROCEDURE — 97110 THERAPEUTIC EXERCISES: CPT

## 2017-06-05 PROCEDURE — 97140 MANUAL THERAPY 1/> REGIONS: CPT

## 2017-06-05 PROCEDURE — 97014 ELECTRIC STIMULATION THERAPY: CPT

## 2017-06-12 ENCOUNTER — HOSPITAL ENCOUNTER (OUTPATIENT)
Dept: PHYSICAL THERAPY | Facility: REHABILITATION | Age: 37
End: 2017-06-12
Attending: ORTHOPAEDIC SURGERY
Payer: COMMERCIAL

## 2017-06-12 PROCEDURE — 97014 ELECTRIC STIMULATION THERAPY: CPT

## 2017-06-12 PROCEDURE — 97140 MANUAL THERAPY 1/> REGIONS: CPT

## 2017-06-14 ENCOUNTER — HOSPITAL ENCOUNTER (OUTPATIENT)
Dept: PHYSICAL THERAPY | Facility: REHABILITATION | Age: 37
End: 2017-06-14
Attending: ORTHOPAEDIC SURGERY
Payer: COMMERCIAL

## 2017-06-14 PROCEDURE — 97140 MANUAL THERAPY 1/> REGIONS: CPT

## 2017-06-14 PROCEDURE — 97014 ELECTRIC STIMULATION THERAPY: CPT

## 2017-06-20 ENCOUNTER — HOSPITAL ENCOUNTER (OUTPATIENT)
Dept: PHYSICAL THERAPY | Facility: REHABILITATION | Age: 37
End: 2017-06-20
Attending: ORTHOPAEDIC SURGERY
Payer: COMMERCIAL

## 2017-06-20 PROCEDURE — 97140 MANUAL THERAPY 1/> REGIONS: CPT

## 2017-06-20 PROCEDURE — 97110 THERAPEUTIC EXERCISES: CPT

## 2017-06-20 PROCEDURE — 97014 ELECTRIC STIMULATION THERAPY: CPT

## 2017-06-22 ENCOUNTER — HOSPITAL ENCOUNTER (OUTPATIENT)
Dept: PHYSICAL THERAPY | Facility: REHABILITATION | Age: 37
End: 2017-06-22
Attending: ORTHOPAEDIC SURGERY
Payer: COMMERCIAL

## 2017-06-22 PROCEDURE — 97110 THERAPEUTIC EXERCISES: CPT

## 2017-06-22 PROCEDURE — 97014 ELECTRIC STIMULATION THERAPY: CPT

## 2017-06-22 PROCEDURE — 97140 MANUAL THERAPY 1/> REGIONS: CPT

## 2017-06-26 ENCOUNTER — HOSPITAL ENCOUNTER (OUTPATIENT)
Dept: PHYSICAL THERAPY | Facility: REHABILITATION | Age: 37
End: 2017-06-26
Attending: ORTHOPAEDIC SURGERY
Payer: COMMERCIAL

## 2017-06-26 PROCEDURE — 97110 THERAPEUTIC EXERCISES: CPT

## 2017-06-26 PROCEDURE — 97014 ELECTRIC STIMULATION THERAPY: CPT

## 2017-06-26 PROCEDURE — 97140 MANUAL THERAPY 1/> REGIONS: CPT

## 2017-06-28 ENCOUNTER — HOSPITAL ENCOUNTER (OUTPATIENT)
Dept: PHYSICAL THERAPY | Facility: REHABILITATION | Age: 37
End: 2017-06-28
Attending: ORTHOPAEDIC SURGERY
Payer: COMMERCIAL

## 2017-06-28 PROCEDURE — 97014 ELECTRIC STIMULATION THERAPY: CPT

## 2017-06-28 PROCEDURE — 97140 MANUAL THERAPY 1/> REGIONS: CPT

## 2017-07-05 ENCOUNTER — HOSPITAL ENCOUNTER (OUTPATIENT)
Dept: PHYSICAL THERAPY | Facility: REHABILITATION | Age: 37
End: 2017-07-05
Attending: ORTHOPAEDIC SURGERY
Payer: COMMERCIAL

## 2017-07-05 PROCEDURE — 97110 THERAPEUTIC EXERCISES: CPT

## 2017-07-05 PROCEDURE — 97014 ELECTRIC STIMULATION THERAPY: CPT

## 2017-07-10 ENCOUNTER — HOSPITAL ENCOUNTER (OUTPATIENT)
Dept: PHYSICAL THERAPY | Facility: REHABILITATION | Age: 37
End: 2017-07-10
Attending: ORTHOPAEDIC SURGERY
Payer: COMMERCIAL

## 2017-07-10 PROCEDURE — 97110 THERAPEUTIC EXERCISES: CPT

## 2017-07-10 PROCEDURE — 97140 MANUAL THERAPY 1/> REGIONS: CPT

## 2017-07-10 PROCEDURE — 97014 ELECTRIC STIMULATION THERAPY: CPT

## 2017-07-12 ENCOUNTER — HOSPITAL ENCOUNTER (OUTPATIENT)
Dept: PHYSICAL THERAPY | Facility: REHABILITATION | Age: 37
End: 2017-07-12
Attending: ORTHOPAEDIC SURGERY
Payer: COMMERCIAL

## 2017-07-12 PROCEDURE — 97110 THERAPEUTIC EXERCISES: CPT

## 2017-07-12 PROCEDURE — 97014 ELECTRIC STIMULATION THERAPY: CPT

## 2017-07-17 ENCOUNTER — HOSPITAL ENCOUNTER (OUTPATIENT)
Dept: PHYSICAL THERAPY | Facility: REHABILITATION | Age: 37
End: 2017-07-17
Attending: ORTHOPAEDIC SURGERY
Payer: COMMERCIAL

## 2017-07-17 PROCEDURE — 97110 THERAPEUTIC EXERCISES: CPT

## 2017-07-17 PROCEDURE — 97014 ELECTRIC STIMULATION THERAPY: CPT

## 2017-07-19 ENCOUNTER — HOSPITAL ENCOUNTER (OUTPATIENT)
Dept: PHYSICAL THERAPY | Facility: REHABILITATION | Age: 37
End: 2017-07-19
Attending: ORTHOPAEDIC SURGERY
Payer: COMMERCIAL

## 2017-07-19 PROCEDURE — 97014 ELECTRIC STIMULATION THERAPY: CPT

## 2017-07-19 PROCEDURE — 97110 THERAPEUTIC EXERCISES: CPT

## 2017-07-24 ENCOUNTER — APPOINTMENT (OUTPATIENT)
Dept: PHYSICAL THERAPY | Facility: REHABILITATION | Age: 37
End: 2017-07-24
Attending: ORTHOPAEDIC SURGERY
Payer: COMMERCIAL

## 2017-07-25 ENCOUNTER — HOSPITAL ENCOUNTER (OUTPATIENT)
Dept: LAB | Facility: MEDICAL CENTER | Age: 37
End: 2017-07-25
Payer: COMMERCIAL

## 2017-07-25 LAB
BDY FAT % MEASURED: 35.1 %
BP DIAS: 73 MMHG
BP SYS: 111 MMHG
DIABETES HTDIA: NO
EVENT NAME HTEVT: NORMAL
HYPERTENSION HTHYP: NO
SCREENING LOC CITY HTCIT: NORMAL
SCREENING LOC STATE HTSTA: NORMAL
SCREENING LOCATION HTLOC: NORMAL
SMOKING HTSMO: NO
SUBSCRIBER ID HTSID: NORMAL

## 2017-07-25 PROCEDURE — 36415 COLL VENOUS BLD VENIPUNCTURE: CPT

## 2017-07-25 PROCEDURE — S5190 WELLNESS ASSESSMENT BY NONPH: HCPCS

## 2017-07-25 PROCEDURE — 82947 ASSAY GLUCOSE BLOOD QUANT: CPT

## 2017-07-25 PROCEDURE — 80061 LIPID PANEL: CPT

## 2017-07-26 ENCOUNTER — HOSPITAL ENCOUNTER (OUTPATIENT)
Dept: PHYSICAL THERAPY | Facility: REHABILITATION | Age: 37
End: 2017-07-26
Attending: ORTHOPAEDIC SURGERY
Payer: COMMERCIAL

## 2017-07-26 LAB
CHOLEST SERPL-MCNC: 160 MG/DL (ref 100–199)
FASTING HTFAS: YES
GLUCOSE SERPL-MCNC: 90 MG/DL (ref 65–99)
HDLC SERPL-MCNC: 42 MG/DL
LDLC SERPL CALC-MCNC: 91 MG/DL
TRIGL SERPL-MCNC: 134 MG/DL (ref 0–149)

## 2017-07-26 PROCEDURE — 97014 ELECTRIC STIMULATION THERAPY: CPT

## 2017-07-26 PROCEDURE — 97110 THERAPEUTIC EXERCISES: CPT

## 2017-07-31 ENCOUNTER — HOSPITAL ENCOUNTER (OUTPATIENT)
Dept: PHYSICAL THERAPY | Facility: REHABILITATION | Age: 37
End: 2017-07-31
Attending: ORTHOPAEDIC SURGERY
Payer: COMMERCIAL

## 2017-07-31 PROCEDURE — 97110 THERAPEUTIC EXERCISES: CPT

## 2017-07-31 PROCEDURE — 97014 ELECTRIC STIMULATION THERAPY: CPT

## 2017-08-07 ENCOUNTER — HOSPITAL ENCOUNTER (OUTPATIENT)
Dept: PHYSICAL THERAPY | Facility: REHABILITATION | Age: 37
End: 2017-08-07
Attending: ORTHOPAEDIC SURGERY
Payer: COMMERCIAL

## 2017-08-07 PROCEDURE — 97110 THERAPEUTIC EXERCISES: CPT

## 2017-08-07 PROCEDURE — 97014 ELECTRIC STIMULATION THERAPY: CPT

## 2017-08-09 ENCOUNTER — HOSPITAL ENCOUNTER (OUTPATIENT)
Dept: PHYSICAL THERAPY | Facility: REHABILITATION | Age: 37
End: 2017-08-09
Attending: ORTHOPAEDIC SURGERY
Payer: COMMERCIAL

## 2017-08-09 PROCEDURE — 97110 THERAPEUTIC EXERCISES: CPT

## 2017-08-09 PROCEDURE — 97014 ELECTRIC STIMULATION THERAPY: CPT

## 2017-08-16 ENCOUNTER — HOSPITAL ENCOUNTER (OUTPATIENT)
Dept: PHYSICAL THERAPY | Facility: REHABILITATION | Age: 37
End: 2017-08-16
Attending: ORTHOPAEDIC SURGERY
Payer: COMMERCIAL

## 2017-08-16 PROCEDURE — 97110 THERAPEUTIC EXERCISES: CPT

## 2017-08-16 PROCEDURE — 97014 ELECTRIC STIMULATION THERAPY: CPT

## 2017-08-23 ENCOUNTER — HOSPITAL ENCOUNTER (OUTPATIENT)
Dept: PHYSICAL THERAPY | Facility: REHABILITATION | Age: 37
End: 2017-08-23
Attending: ORTHOPAEDIC SURGERY
Payer: COMMERCIAL

## 2017-08-23 PROCEDURE — 97110 THERAPEUTIC EXERCISES: CPT

## 2017-08-28 ENCOUNTER — HOSPITAL ENCOUNTER (OUTPATIENT)
Dept: PHYSICAL THERAPY | Facility: REHABILITATION | Age: 37
End: 2017-08-28
Attending: ORTHOPAEDIC SURGERY
Payer: COMMERCIAL

## 2017-08-28 PROCEDURE — 97110 THERAPEUTIC EXERCISES: CPT

## 2017-08-30 ENCOUNTER — HOSPITAL ENCOUNTER (OUTPATIENT)
Dept: PHYSICAL THERAPY | Facility: REHABILITATION | Age: 37
End: 2017-08-30
Attending: ORTHOPAEDIC SURGERY
Payer: COMMERCIAL

## 2017-08-30 PROCEDURE — 97110 THERAPEUTIC EXERCISES: CPT

## 2017-09-11 ENCOUNTER — HOSPITAL ENCOUNTER (OUTPATIENT)
Dept: PHYSICAL THERAPY | Facility: REHABILITATION | Age: 37
End: 2017-09-11
Attending: ORTHOPAEDIC SURGERY
Payer: COMMERCIAL

## 2017-09-11 PROCEDURE — 97110 THERAPEUTIC EXERCISES: CPT

## 2017-09-13 ENCOUNTER — HOSPITAL ENCOUNTER (OUTPATIENT)
Dept: PHYSICAL THERAPY | Facility: REHABILITATION | Age: 37
End: 2017-09-13
Attending: ORTHOPAEDIC SURGERY
Payer: COMMERCIAL

## 2017-09-13 PROCEDURE — 97110 THERAPEUTIC EXERCISES: CPT

## 2017-10-12 ENCOUNTER — IMMUNIZATION (OUTPATIENT)
Dept: OCCUPATIONAL MEDICINE | Facility: CLINIC | Age: 37
End: 2017-10-12

## 2017-10-12 DIAGNOSIS — Z23 NEED FOR VACCINATION: ICD-10-CM

## 2017-10-12 PROCEDURE — 90686 IIV4 VACC NO PRSV 0.5 ML IM: CPT | Performed by: PREVENTIVE MEDICINE

## 2018-06-29 ENCOUNTER — HOSPITAL ENCOUNTER (OUTPATIENT)
Dept: LAB | Facility: MEDICAL CENTER | Age: 38
End: 2018-06-29
Payer: COMMERCIAL

## 2018-06-29 LAB
BDY FAT % MEASURED: 34.7 %
BP DIAS: 76 MMHG
BP SYS: 116 MMHG
CHOLEST SERPL-MCNC: 179 MG/DL (ref 100–199)
DIABETES HTDIA: NO
EVENT NAME HTEVT: NORMAL
FASTING HTFAS: YES
GLUCOSE SERPL-MCNC: 96 MG/DL (ref 65–99)
HDLC SERPL-MCNC: 54 MG/DL
HYPERTENSION HTHYP: NO
LDLC SERPL CALC-MCNC: 110 MG/DL
SCREENING LOC CITY HTCIT: NORMAL
SCREENING LOC STATE HTSTA: NORMAL
SCREENING LOCATION HTLOC: NORMAL
SMOKING HTSMO: NO
SUBSCRIBER ID HTSID: NORMAL
TRIGL SERPL-MCNC: 73 MG/DL (ref 0–149)

## 2018-06-29 PROCEDURE — 80061 LIPID PANEL: CPT

## 2018-06-29 PROCEDURE — 36415 COLL VENOUS BLD VENIPUNCTURE: CPT

## 2018-06-29 PROCEDURE — 82947 ASSAY GLUCOSE BLOOD QUANT: CPT

## 2018-06-29 PROCEDURE — S5190 WELLNESS ASSESSMENT BY NONPH: HCPCS

## 2018-09-26 ENCOUNTER — IMMUNIZATION (OUTPATIENT)
Dept: OCCUPATIONAL MEDICINE | Facility: CLINIC | Age: 38
End: 2018-09-26

## 2018-09-26 DIAGNOSIS — Z23 NEED FOR VACCINATION: ICD-10-CM

## 2018-10-06 PROCEDURE — 90686 IIV4 VACC NO PRSV 0.5 ML IM: CPT | Performed by: PREVENTIVE MEDICINE

## 2019-03-11 ENCOUNTER — OFFICE VISIT (OUTPATIENT)
Dept: INTERNAL MEDICINE | Facility: MEDICAL CENTER | Age: 39
End: 2019-03-11
Payer: COMMERCIAL

## 2019-03-11 VITALS
HEART RATE: 85 BPM | OXYGEN SATURATION: 93 % | TEMPERATURE: 98 F | BODY MASS INDEX: 28.61 KG/M2 | HEIGHT: 66 IN | WEIGHT: 178 LBS | SYSTOLIC BLOOD PRESSURE: 116 MMHG | DIASTOLIC BLOOD PRESSURE: 80 MMHG

## 2019-03-11 DIAGNOSIS — G43.109 MIGRAINE WITH AURA AND WITHOUT STATUS MIGRAINOSUS, NOT INTRACTABLE: ICD-10-CM

## 2019-03-11 DIAGNOSIS — Z23 NEED FOR TDAP VACCINATION: ICD-10-CM

## 2019-03-11 DIAGNOSIS — Z72.0 TOBACCO ABUSE: ICD-10-CM

## 2019-03-11 DIAGNOSIS — Z13.220 SCREENING, LIPID: ICD-10-CM

## 2019-03-11 DIAGNOSIS — F41.9 ANXIETY DISORDER, UNSPECIFIED TYPE: ICD-10-CM

## 2019-03-11 DIAGNOSIS — Z71.6 TOBACCO ABUSE COUNSELING: ICD-10-CM

## 2019-03-11 DIAGNOSIS — Z12.31 ENCOUNTER FOR SCREENING MAMMOGRAM FOR BREAST CANCER: ICD-10-CM

## 2019-03-11 PROBLEM — S93.402A SPRAIN OF LEFT ANKLE: Status: RESOLVED | Noted: 2017-05-15 | Resolved: 2019-03-11

## 2019-03-11 PROBLEM — M76.829 TIBIALIS POSTERIOR TENDONITIS: Status: RESOLVED | Noted: 2017-05-15 | Resolved: 2019-03-11

## 2019-03-11 PROCEDURE — 99204 OFFICE O/P NEW MOD 45 MIN: CPT | Mod: GC,25 | Performed by: INTERNAL MEDICINE

## 2019-03-11 PROCEDURE — 90471 IMMUNIZATION ADMIN: CPT | Performed by: INTERNAL MEDICINE

## 2019-03-11 PROCEDURE — 90715 TDAP VACCINE 7 YRS/> IM: CPT | Performed by: INTERNAL MEDICINE

## 2019-03-11 RX ORDER — RIZATRIPTAN BENZOATE 5 MG/1
5 TABLET ORAL
Qty: 10 TAB | Refills: 0 | Status: SHIPPED | OUTPATIENT
Start: 2019-03-11 | End: 2020-06-02 | Stop reason: SDUPTHER

## 2019-03-11 ASSESSMENT — PATIENT HEALTH QUESTIONNAIRE - PHQ9: CLINICAL INTERPRETATION OF PHQ2 SCORE: 0

## 2019-03-11 NOTE — LETTER
[employer]  Mariza Laura M.D.  [address]  [place] [address]  Fax: [phone]   Authorization for Release/Disclosure of   Protected Health Information   Name: GILMER HUERTA : 1980 SSN: [id number]   Address: [address] [place] [id number] Phone:    [phone] (home)    I authorize the entity listed below to release/disclose the PHI below to:   [facility], [facility]/Mariza Laura M.D. and Mariza Laura M.D.   Provider or Entity Name:     Address   City, State, Zip   Phone:      Fax:     Reason for request: continuity of care   Information to be released:    [  ] LAST COLONOSCOPY,  including any PATH REPORT and follow-up  [  ] LAST FIT/COLOGUARD RESULT [  ] LAST DEXA  [  ] LAST MAMMOGRAM  [  ] LAST PAP  [  ] LAST LABS [  ] RETINA EXAM REPORT  [  ] IMMUNIZATION RECORDS  [  ] Release all info      [  ] Check here and initial the line next to each item to release ALL health information INCLUDING  _____ Care and treatment for drug and / or alcohol abuse  _____ HIV testing, infection status, or AIDS  _____ Genetic Testing    DATES OF SERVICE OR TIME PERIOD TO BE DISCLOSED: _____________  I understand and acknowledge that:  * This Authorization may be revoked at any time by you in writing, except if your health information has already been used or disclosed.  * Your health information that will be used or disclosed as a result of you signing this authorization could be re-disclosed by the recipient. If this occurs, your re-disclosed health information may no longer be protected by State or Federal laws.  * You may refuse to sign this Authorization. Your refusal will not affect your ability to obtain treatment.  * This Authorization becomes effective upon signing and will  on (date) __________.      If no date is indicated, this Authorization will  one (1) year from the signature date.    Name: Gilmer Huerta    Signature:   Date:     3/11/2019       PLEASE FAX REQUESTED RECORDS BACK TO: [phone]

## 2019-03-11 NOTE — NON-PROVIDER
"Alondra Huerta is a 38 y.o. female here for a non-provider visit for:   TDAP    Reason for immunization: Overdue/Provider Recommended  Immunization records indicate need for vaccine: Yes, confirmed with Epic  Minimum interval has been met for this vaccine: Yes  ABN completed: Not Indicated    Order and dose verified by: WU  VIS Dated  02/24/15 was given to patient: Yes  All IAC Questionnaire questions were answered \"No.\"    Patient tolerated injection and no adverse effects were observed or reported: Yes    Pt scheduled for next dose in series: Not Indicated  "

## 2019-03-12 NOTE — PROGRESS NOTES
New Patient to Establish    Reason to establish: New patient to establish    CC: establish care; migraine headache; anxiety disorder    HPI: Alondra Huerta is a 37 yo F with hx of migraine and anxiety disorder who is here for above cc. Previously, she took topamax for prevention of migraine and prn Maxalt, fiorcet and excedrin for acute episode. She was also on prozac for anxiety disorder. But she has weaned herself off from medications and has not seen a provider for sometimes. She recently has been going through significant amount of family stress as her son was being suicidal. She and her family has been going to counseling therapy every week and she also goes on her own as well. She denies depression. Her EULOGIO 7 score is calculated as 3 and PHQ 2 score 0. She states that she has been having migraine for about 2-3 times a month lately. Currently going through one, taking excedrin 1-2 pills / day and also drinks dr. Pepper 1-2 cans / day. Last night she took a pill of left over maxalt which knocked her out to sleep and this morning her headache has improved somewhat but did not resolve completely. Reports aura sometimes but not always. Denies confusion, visual changes, sensory or motor neuro deficits.       Patient Active Problem List    Diagnosis Date Noted   • Migraine with aura and without status migrainosus, not intractable 03/11/2019   • Anxiety disorder 03/11/2019   • Tobacco abuse 03/11/2019       Past Medical History:   Diagnosis Date   • Anxiety disorder    • Migraine        Current Outpatient Prescriptions   Medication Sig Dispense Refill   • rizatriptan (MAXALT) 5 MG tablet Take 1 Tab by mouth Once PRN for Migraine for up to 1 dose. 10 Tab 0   • asa/apap/caffeine (EXCEDRIN) 250-250-65 MG Tab Take 1 Tab by mouth as needed for Headache.       No current facility-administered medications for this visit.        Allergies as of 03/11/2019 - Reviewed 03/11/2019   Allergen Reaction Noted   • Iodine Anaphylaxis  and Shortness of Breath 01/12/2011   • Shellfish allergy Anaphylaxis 05/11/2017       Social History     Social History   • Marital status:      Spouse name: N/A   • Number of children: N/A   • Years of education: N/A     Occupational History   • Not on file.     Social History Main Topics   • Smoking status: Current Every Day Smoker     Packs/day: 0.50     Years: 20.00     Types: Cigarettes   • Smokeless tobacco: Never Used   • Alcohol use Yes      Comment: socially   • Drug use: No   • Sexual activity: Yes     Partners: Male     Birth control/ protection: Surgical     Other Topics Concern   • Not on file     Social History Narrative   • No narrative on file       Family History   Problem Relation Age of Onset   • No Known Problems Mother    • Diabetes Father    • Heart Disease Father 50        Heart attack    • Hypertension Father    • Other Sister         migraine       Past Surgical History:   Procedure Laterality Date   • ANKLE ARTHROSCOPY Left 5/15/2017    Procedure: ANKLE ARTHROSCOPY;  Surgeon: Vaibhav Casper M.D.;  Location: SURGERY [facility];  Service:    • ANKLE LIGAMENT RECONSTRUCTION  5/15/2017    Procedure: ANKLE LIGAMENT RECONSTRUCTION-LATERAL;  Surgeon: Vaibhav Casper M.D.;  Location: SURGERY [facility];  Service:    • VAGINAL HYSTERECTOMY SCOPE TOTAL  5/22/2009    Performed by SHELLY GUNDERSON at [facility]   • SALPINGECTOMY  5/22/2009    Performed by SHELLY GUNDERSON at [facility]   • GYN SURGERY  1999,2005    C Section x2        ROS: As per HPI. Additional pertinent symptoms as noted below:     Patient denies chest pain, dyspnea, orthopnea, PND, edema, cough, fever, chills, nausea / vomiting, abdominal pain, dysuria, changes in appetite or weight, diarrhea / constipation, headache, tingling / numbness sensation, weakness, visual changes.    /80 (BP Location: Right arm, Patient Position: Sitting, BP Cuff Size: Adult)   Pulse 85    "Temp 36.7 °C (98 °F) (Temporal)   Ht 1.676 m (5' 6\")   Wt 80.7 kg (178 lb)   SpO2 93%   BMI 28.73 kg/m²     Physical Exam  General:  Alert and oriented, No apparent distress.    Eyes: Pupils equal and reactive. No scleral icterus.    Throat: Clear no erythema or exudates noted.    Neck: Supple. No lymphadenopathy noted. Thyroid not enlarged.    Lungs: Clear to auscultation and percussion bilaterally.    Cardiovascular: Regular rate and rhythm. No murmurs, rubs or gallops.    Abdomen:  Benign. No rebound or guarding noted.    Extremities: No clubbing, cyanosis, edema.    Skin: Clear. No rash or suspicious skin lesions noted.    Neuro: A&O x 4, CN II-XII grossly intact, motor 5/5 equal on both sides of body, no gross sensory deficits, DTR 2+ equal both sides.       Assessment and Plan    Migraine with aura and without status migrainosus, not intractable  - previously on topamax for prevention and prn maxalt, excedrin and fiorcet for acute episode. But has been off meds for seomtimes until recently her migraine recurred from family stress.   - currently taking excedrin 1-2 tabs / day and also dr. Bartlett 1-2 cans / day. Headache has been persistent for 3 days now. ? Rebound headache from caffeine.  - recommended her to stop taking excedrin and OK for a can of dr. Bartlett (caffeine 42 mg / 12 oz).   - keep journal / diary on headache.    - will prescribe her prn Maxalt for severe migraine episode. Otherwise, recommend to try tylenol / NSAID.   And get labs.   -     rizatriptan (MAXALT) 5 MG tablet; Take 1 Tab by mouth Once PRN for Migraine for up to 1 dose.  -     CBC WITH DIFFERENTIAL; Future  -     Comp Metabolic Panel; Future  -     TSH WITH REFLEX TO FT4; Future      Anxiety disorder, unspecified type  - previously on prozac but has been off from meds for sometimes.   - EULOGIO 7 score is 3. PHQ 2 score 0. Will get labs first and discuss SSRI next time if needed.   -     CBC WITH DIFFERENTIAL; Future  -     Comp " Metabolic Panel; Future  -     TSH WITH REFLEX TO FT4; Future      Encounter for screening mammogram for breast cancer  -     MA-SCREEN MAMMO W/CAD-BILAT; Future      Need for Tdap vaccination  -     Tdap Vaccine =>6YO IM      Screening, lipid  -     Lipid Profile; Future      Tobacco abuse  Tobacco abuse counseling  - current active tobacco use, 1/2 PPD for about 20 yrs.   - tobacco cessation has been counseled, patient is interested in quitting but not ready and would like to deal with family stress first.         Followup: Return in about 5 weeks (around 4/15/2019).    Risk Assessment (discuss potential complications a function of chronic problems): discussed with patient.     Complexity (discuss number of co-morbidities): level - 4     Signed by: Mariza Laura M.D.

## 2019-03-14 ENCOUNTER — HOSPITAL ENCOUNTER (OUTPATIENT)
Dept: LAB | Facility: MEDICAL CENTER | Age: 39
End: 2019-03-14
Attending: STUDENT IN AN ORGANIZED HEALTH CARE EDUCATION/TRAINING PROGRAM
Payer: COMMERCIAL

## 2019-03-14 ENCOUNTER — HOSPITAL ENCOUNTER (OUTPATIENT)
Dept: LAB | Facility: MEDICAL CENTER | Age: 39
End: 2019-03-14
Payer: COMMERCIAL

## 2019-03-14 DIAGNOSIS — Z13.220 SCREENING, LIPID: ICD-10-CM

## 2019-03-14 DIAGNOSIS — F41.9 ANXIETY DISORDER, UNSPECIFIED TYPE: ICD-10-CM

## 2019-03-14 DIAGNOSIS — G43.109 MIGRAINE WITH AURA AND WITHOUT STATUS MIGRAINOSUS, NOT INTRACTABLE: ICD-10-CM

## 2019-03-14 LAB
ALBUMIN SERPL BCP-MCNC: 4.8 G/DL (ref 3.2–4.9)
ALBUMIN/GLOB SERPL: 1.6 G/DL
ALP SERPL-CCNC: 71 U/L (ref 30–99)
ALT SERPL-CCNC: 24 U/L (ref 2–50)
ANION GAP SERPL CALC-SCNC: 10 MMOL/L (ref 0–11.9)
AST SERPL-CCNC: 24 U/L (ref 12–45)
BASOPHILS # BLD AUTO: 0.5 % (ref 0–1.8)
BASOPHILS # BLD: 0.05 K/UL (ref 0–0.12)
BDY FAT % MEASURED: 38 %
BILIRUB SERPL-MCNC: 0.8 MG/DL (ref 0.1–1.5)
BP DIAS: 78 MMHG
BP SYS: 124 MMHG
BUN SERPL-MCNC: 18 MG/DL (ref 8–22)
CALCIUM SERPL-MCNC: 9.8 MG/DL (ref 8.5–10.5)
CHLORIDE SERPL-SCNC: 106 MMOL/L (ref 96–112)
CHOLEST SERPL-MCNC: 218 MG/DL (ref 100–199)
CHOLEST SERPL-MCNC: 218 MG/DL (ref 100–199)
CO2 SERPL-SCNC: 25 MMOL/L (ref 20–33)
CREAT SERPL-MCNC: 0.87 MG/DL (ref 0.5–1.4)
DIABETES HTDIA: NO
EOSINOPHIL # BLD AUTO: 0.44 K/UL (ref 0–0.51)
EOSINOPHIL NFR BLD: 4.3 % (ref 0–6.9)
ERYTHROCYTE [DISTWIDTH] IN BLOOD BY AUTOMATED COUNT: 42.9 FL (ref 35.9–50)
EVENT NAME HTEVT: NORMAL
FASTING HTFAS: YES
FASTING STATUS PATIENT QL REPORTED: NORMAL
GLOBULIN SER CALC-MCNC: 3 G/DL (ref 1.9–3.5)
GLUCOSE SERPL-MCNC: 93 MG/DL (ref 65–99)
GLUCOSE SERPL-MCNC: 95 MG/DL (ref 65–99)
HCT VFR BLD AUTO: 48.3 % (ref 37–47)
HDLC SERPL-MCNC: 57 MG/DL
HDLC SERPL-MCNC: 61 MG/DL
HGB BLD-MCNC: 15.8 G/DL (ref 12–16)
HYPERTENSION HTHYP: NO
IMM GRANULOCYTES # BLD AUTO: 0.03 K/UL (ref 0–0.11)
IMM GRANULOCYTES NFR BLD AUTO: 0.3 % (ref 0–0.9)
LDLC SERPL CALC-MCNC: 133 MG/DL
LDLC SERPL CALC-MCNC: 138 MG/DL
LYMPHOCYTES # BLD AUTO: 2.45 K/UL (ref 1–4.8)
LYMPHOCYTES NFR BLD: 23.7 % (ref 22–41)
MCH RBC QN AUTO: 30.2 PG (ref 27–33)
MCHC RBC AUTO-ENTMCNC: 32.7 G/DL (ref 33.6–35)
MCV RBC AUTO: 92.2 FL (ref 81.4–97.8)
MONOCYTES # BLD AUTO: 1.01 K/UL (ref 0–0.85)
MONOCYTES NFR BLD AUTO: 9.8 % (ref 0–13.4)
NEUTROPHILS # BLD AUTO: 6.34 K/UL (ref 2–7.15)
NEUTROPHILS NFR BLD: 61.4 % (ref 44–72)
NRBC # BLD AUTO: 0 K/UL
NRBC BLD-RTO: 0 /100 WBC
PLATELET # BLD AUTO: 229 K/UL (ref 164–446)
PMV BLD AUTO: 10.7 FL (ref 9–12.9)
POTASSIUM SERPL-SCNC: 3.6 MMOL/L (ref 3.6–5.5)
PROT SERPL-MCNC: 7.8 G/DL (ref 6–8.2)
RBC # BLD AUTO: 5.24 M/UL (ref 4.2–5.4)
SCREENING LOC CITY HTCIT: NORMAL
SCREENING LOC STATE HTSTA: NORMAL
SCREENING LOCATION HTLOC: NORMAL
SMOKING HTSMO: NO
SODIUM SERPL-SCNC: 141 MMOL/L (ref 135–145)
SUBSCRIBER ID HTSID: NORMAL
TRIGL SERPL-MCNC: 113 MG/DL (ref 0–149)
TRIGL SERPL-MCNC: 118 MG/DL (ref 0–149)
TSH SERPL DL<=0.005 MIU/L-ACNC: 3.07 UIU/ML (ref 0.38–5.33)
WBC # BLD AUTO: 10.3 K/UL (ref 4.8–10.8)

## 2019-03-14 PROCEDURE — 80061 LIPID PANEL: CPT | Mod: 91

## 2019-03-14 PROCEDURE — S5190 WELLNESS ASSESSMENT BY NONPH: HCPCS

## 2019-03-14 PROCEDURE — 36415 COLL VENOUS BLD VENIPUNCTURE: CPT

## 2019-03-14 PROCEDURE — 80061 LIPID PANEL: CPT

## 2019-03-14 PROCEDURE — 85025 COMPLETE CBC W/AUTO DIFF WBC: CPT

## 2019-03-14 PROCEDURE — 82947 ASSAY GLUCOSE BLOOD QUANT: CPT

## 2019-03-14 PROCEDURE — 84443 ASSAY THYROID STIM HORMONE: CPT

## 2019-03-14 PROCEDURE — 80053 COMPREHEN METABOLIC PANEL: CPT

## 2019-03-28 ENCOUNTER — HOSPITAL ENCOUNTER (OUTPATIENT)
Dept: RADIOLOGY | Facility: MEDICAL CENTER | Age: 39
End: 2019-03-28
Attending: STUDENT IN AN ORGANIZED HEALTH CARE EDUCATION/TRAINING PROGRAM
Payer: COMMERCIAL

## 2019-03-28 DIAGNOSIS — Z12.31 VISIT FOR SCREENING MAMMOGRAM: ICD-10-CM

## 2019-03-28 PROCEDURE — 77063 BREAST TOMOSYNTHESIS BI: CPT

## 2019-04-15 ENCOUNTER — OFFICE VISIT (OUTPATIENT)
Dept: INTERNAL MEDICINE | Facility: MEDICAL CENTER | Age: 39
End: 2019-04-15
Payer: COMMERCIAL

## 2019-04-15 VITALS
HEART RATE: 85 BPM | HEIGHT: 66 IN | DIASTOLIC BLOOD PRESSURE: 72 MMHG | BODY MASS INDEX: 30.05 KG/M2 | WEIGHT: 187 LBS | TEMPERATURE: 97.2 F | OXYGEN SATURATION: 95 % | SYSTOLIC BLOOD PRESSURE: 112 MMHG

## 2019-04-15 DIAGNOSIS — F41.9 ANXIETY DISORDER, UNSPECIFIED TYPE: ICD-10-CM

## 2019-04-15 DIAGNOSIS — E78.00 HYPERCHOLESTEROLEMIA: ICD-10-CM

## 2019-04-15 DIAGNOSIS — Z72.0 TOBACCO ABUSE: ICD-10-CM

## 2019-04-15 DIAGNOSIS — G43.109 MIGRAINE WITH AURA AND WITHOUT STATUS MIGRAINOSUS, NOT INTRACTABLE: ICD-10-CM

## 2019-04-15 PROCEDURE — 99213 OFFICE O/P EST LOW 20 MIN: CPT | Mod: GE | Performed by: INTERNAL MEDICINE

## 2019-04-15 RX ORDER — LORATADINE 10 MG/1
10 TABLET ORAL DAILY
COMMUNITY
End: 2022-12-20

## 2019-04-15 NOTE — PROGRESS NOTES
Established Patient    Alondra Huerta is a 39 y.o. female who presents today with the following:    CC: Follow-up on migraine headaches; reviewed labs and mammogram.    HPI: 39-year-old female with history of endometriosis status post total vaginal hysterectomy 2009, migraine headache, anxiety disorder and tobacco abuse who is here for above CC.  Patient reports that she thinks she has stopped taking Excedrin and reduce caffeine intake, her migraine headache has resolved.  She has not used Maxalt that was prescribed last time for acute migraine episode.  She has had cut down on her cigarette smoking currently and declines any helped with quitting smoking at this point.  Her anxiety is currently controllable with conservative measures such as listening to soothing music at night before she goes to bed.  And she is planning to try to do journaling to help with anxiety.    Labs reviewed.  CBC, CMP, lipid profile, TSH.  Relevant of 3/14/19: , , HDL 57, . ASCVD 2.2%.     Mammogram reviewed.  No malignancy but dense breast tissue, to repeat one in a year.    She is with 2 of her nephews today which she has been helped watching them 2 times a week.  She states that today she has her yard work then while kid is playing and she feels great.     Patient Active Problem List    Diagnosis Date Noted   • Hypercholesterolemia 04/15/2019   • Migraine with aura and without status migrainosus, not intractable 03/11/2019   • Anxiety disorder 03/11/2019   • Tobacco abuse 03/11/2019       Current Outpatient Prescriptions   Medication Sig Dispense Refill   • loratadine (CLARITIN) 10 MG Tab Take 10 mg by mouth every day.     • rizatriptan (MAXALT) 5 MG tablet Take 1 Tab by mouth Once PRN for Migraine for up to 1 dose. 10 Tab 0   • asa/apap/caffeine (EXCEDRIN) 250-250-65 MG Tab Take 1 Tab by mouth as needed for Headache.       No current facility-administered medications for this visit.        ROS: As per HPI.  "Additional pertinent symptoms as noted below:     Patient denies chest pain, dyspnea, orthopnea, PND, edema, cough, fever, chills, nausea / vomiting, abdominal pain, dysuria, changes in appetite or weight, diarrhea / constipation, headache, tingling / numbness sensation, weakness, visual changes.       /72 (BP Location: Left arm, Patient Position: Sitting, BP Cuff Size: Adult)   Pulse 85   Temp 36.2 °C (97.2 °F) (Temporal)   Ht 1.676 m (5' 6\")   Wt 84.8 kg (187 lb)   SpO2 95%   BMI 30.18 kg/m²     Physical Exam   Constitutional:  oriented to person, place, and time. No distress.   Eyes: Pupils are equal, round, and reactive to light. No scleral icterus.  Neck: Neck supple. No thyromegaly present.   Cardiovascular: Normal rate, regular rhythm and normal heart sounds.  Exam reveals no gallop and no friction rub.  No murmur heard.  Pulmonary/Chest: Breath sounds normal. Chest wall is not dull to percussion.   Musculoskeletal:   no edema.   Lymphadenopathy: no cervical adenopathy  Neurological: alert and oriented to person, place, and time.   Skin: No cyanosis. Nails show no clubbing.        Assessment and Plan    Hypercholesterolemia  3/14/19: , , HDL 57, .   ASCVD 2.2%.   Advised lifestyle modification including regular exercise and low-carb diet.      Migraine headache  Currently stable.  Educated on reducing daily caffeine intake.  PRN Maxalt for acute migraine.      Tobacco abuse  Counseled tobacco cessation.  Patient states that she will cut down on her own and does not wish any help at this point.      Anxiety disorder  EULOGIO score 8.  Currently stable.  Patient is coping her anxiety with relaxing technique at home.      Status post total vaginal hysterectomy for endometriosis 5/2009.      Preventive care  Flu - out of flu season.  TD / TDaP - 3/11/2019.  Pneumococcal - N/A.  Prevnar -N/A.  Colonoscopy -N/A.  Zostavax-N/A.    Women only  Pap -status post total vaginal " hysterectomy.  Mammogram - negative mammogram 3/28/2019, repeat in 1 year.  Dexa -N/A.        Followup: Return in about 6 months (around 10/15/2019).  To reestablish primary care.  Or sooner if needed.    Signed by: Mariza Laura M.D.

## 2019-09-24 ENCOUNTER — IMMUNIZATION (OUTPATIENT)
Dept: OCCUPATIONAL MEDICINE | Facility: CLINIC | Age: 39
End: 2019-09-24

## 2019-09-24 DIAGNOSIS — Z23 NEED FOR VACCINATION: ICD-10-CM

## 2019-09-25 PROCEDURE — 90686 IIV4 VACC NO PRSV 0.5 ML IM: CPT | Performed by: PREVENTIVE MEDICINE

## 2019-10-24 ENCOUNTER — OFFICE VISIT (OUTPATIENT)
Dept: URGENT CARE | Facility: PHYSICIAN GROUP | Age: 39
End: 2019-10-24
Payer: COMMERCIAL

## 2019-10-24 ENCOUNTER — HOSPITAL ENCOUNTER (OUTPATIENT)
Dept: RADIOLOGY | Facility: MEDICAL CENTER | Age: 39
End: 2019-10-24
Attending: EMERGENCY MEDICINE
Payer: COMMERCIAL

## 2019-10-24 VITALS
BODY MASS INDEX: 29.51 KG/M2 | SYSTOLIC BLOOD PRESSURE: 112 MMHG | RESPIRATION RATE: 14 BRPM | HEART RATE: 72 BPM | DIASTOLIC BLOOD PRESSURE: 70 MMHG | OXYGEN SATURATION: 99 % | HEIGHT: 67 IN | WEIGHT: 188 LBS | TEMPERATURE: 97.3 F

## 2019-10-24 DIAGNOSIS — G90.8 AUTONOMIC FACIAL CEPHALGIA: ICD-10-CM

## 2019-10-24 DIAGNOSIS — R51.9 ACUTE INTRACTABLE HEADACHE, UNSPECIFIED HEADACHE TYPE: ICD-10-CM

## 2019-10-24 DIAGNOSIS — R51.9 AUTONOMIC FACIAL CEPHALGIA: ICD-10-CM

## 2019-10-24 PROCEDURE — 99203 OFFICE O/P NEW LOW 30 MIN: CPT | Performed by: EMERGENCY MEDICINE

## 2019-10-24 PROCEDURE — 70450 CT HEAD/BRAIN W/O DYE: CPT

## 2019-10-24 RX ORDER — PROCHLORPERAZINE EDISYLATE 5 MG/ML
5 INJECTION INTRAMUSCULAR; INTRAVENOUS ONCE
Status: COMPLETED | OUTPATIENT
Start: 2019-10-24 | End: 2019-10-24

## 2019-10-24 RX ORDER — SUMATRIPTAN 6 MG/.5ML
6 INJECTION, SOLUTION SUBCUTANEOUS ONCE
Status: COMPLETED | OUTPATIENT
Start: 2019-10-24 | End: 2019-10-24

## 2019-10-24 RX ADMIN — PROCHLORPERAZINE EDISYLATE 5 MG: 5 INJECTION INTRAMUSCULAR; INTRAVENOUS at 13:51

## 2019-10-24 RX ADMIN — SUMATRIPTAN 6 MG: 6 INJECTION, SOLUTION SUBCUTANEOUS at 13:51

## 2019-10-24 SDOH — HEALTH STABILITY: MENTAL HEALTH: HOW OFTEN DO YOU HAVE A DRINK CONTAINING ALCOHOL?: MONTHLY OR LESS

## 2019-10-24 ASSESSMENT — ENCOUNTER SYMPTOMS
MIGRAINE HEADACHES: 1
SENSORY CHANGE: 1
VOMITING: 1
FEVER: 0
NUMBNESS: 0
SINUS PRESSURE: 1
EYE REDNESS: 0
NAUSEA: 1
PHOTOPHOBIA: 0
BLURRED VISION: 1
DIZZINESS: 1
HEADACHES: 1
RHINORRHEA: 0
DOUBLE VISION: 0
EYE PAIN: 1
WEAKNESS: 0
FOCAL WEAKNESS: 0
EYE WATERING: 0
TINGLING: 0

## 2019-10-24 NOTE — PROGRESS NOTES
Subjective:      Alondra Huerta is a 39 y.o. female who presents with Migraine (x 2 days, dizzy, R eye pain )            Headache    This is a new problem. Episode onset: 2 days. The problem occurs daily. The problem has been unchanged. The pain is located in the right unilateral region. The pain radiates to the face. The pain quality is not similar to prior headaches. Associated symptoms include blurred vision, dizziness, eye pain, nausea, sinus pressure and vomiting. Pertinent negatives include no eye redness, eye watering, fever, hearing loss, numbness, phonophobia, photophobia, rhinorrhea, tingling, tinnitus or weakness. Nothing aggravates the symptoms. She has tried Excedrin and triptans for the symptoms. The treatment provided no relief. Her past medical history is significant for migraine headaches. There is no history of recent head traumas, sinus disease or TMJ.   Notes onset of right orbital headache yesterday, associated with nausea and vomiting.  Today notes sense of right eye swelling, nasal congestion, dizziness, continued diffuse right headache.    Review of Systems   Constitutional: Negative for fever.   HENT: Positive for congestion and sinus pressure. Negative for hearing loss, nosebleeds, rhinorrhea and tinnitus.    Eyes: Positive for blurred vision and pain. Negative for double vision, photophobia and redness.   Gastrointestinal: Positive for nausea and vomiting.   Skin: Negative for rash.   Neurological: Positive for dizziness, sensory change and headaches. Negative for tingling, focal weakness, weakness and numbness.     PMH:  has a past medical history of Anxiety disorder and Migraine.  MEDS:   Current Outpatient Medications:   •  rizatriptan (MAXALT) 5 MG tablet, Take 1 Tab by mouth Once PRN for Migraine for up to 1 dose., Disp: 10 Tab, Rfl: 0  •  asa/apap/caffeine (EXCEDRIN) 250-250-65 MG Tab, Take 1 Tab by mouth as needed for Headache., Disp: , Rfl:   •  loratadine (CLARITIN) 10 MG Tab,  "Take 10 mg by mouth every day., Disp: , Rfl:   ALLERGIES:   Allergies   Allergen Reactions   • Iodine Anaphylaxis and Shortness of Breath     IV contrast    • Shellfish Allergy Anaphylaxis     SURGHX:   Past Surgical History:   Procedure Laterality Date   • ANKLE ARTHROSCOPY Left 5/15/2017    Procedure: ANKLE ARTHROSCOPY;  Surgeon: Vaibhav Casper M.D.;  Location: SURGERY [facility];  Service:    • ANKLE LIGAMENT RECONSTRUCTION  5/15/2017    Procedure: ANKLE LIGAMENT RECONSTRUCTION-LATERAL;  Surgeon: Vaibhav Casper M.D.;  Location: SURGERY [facility];  Service:    • VAGINAL HYSTERECTOMY SCOPE TOTAL  5/22/2009    Performed by SHELLY GUNDERSON at SURGERY SAME DAY [facility] ORS   • SALPINGECTOMY  5/22/2009    Performed by SHELLY GUNDERSON at SURGERY SAME DAY [facility]   • GYN SURGERY  1999,2005    C Section x2    • PRIMARY C SECTION      twice      SOCHX:  reports that she has been smoking cigarettes. She has a 5.00 pack-year smoking history. She has never used smokeless tobacco. She reports that she drinks alcohol. She reports that she does not use drugs.  FH: family history includes Diabetes in her father; Heart Disease (age of onset: 50) in her father; Hypertension in her father; No Known Problems in her mother; Other in her sister.     Objective:     /70   Pulse 72   Temp 36.3 °C (97.3 °F) (Temporal)   Resp 14   Ht 1.702 m (5' 7\")   Wt 85.3 kg (188 lb)   SpO2 99%   BMI 29.44 kg/m²      Physical Exam   Constitutional: She is oriented to person, place, and time. She appears well-developed and well-nourished. She is cooperative. She does not appear ill. No distress.   HENT:   Head: Normocephalic.   Right Ear: Tympanic membrane and ear canal normal.   Left Ear: Tympanic membrane and ear canal normal.   Nose: No mucosal edema or rhinorrhea. Right sinus exhibits maxillary sinus tenderness and frontal sinus tenderness. Left sinus exhibits maxillary sinus tenderness and frontal sinus " tenderness.   Mouth/Throat: Uvula is midline and oropharynx is clear and moist.   No temporomandibular joint tenderness or crepitus.   Eyes: Pupils are equal, round, and reactive to light. Conjunctivae, EOM and lids are normal.   Neck: Trachea normal. Neck supple.   Cardiovascular: Normal rate, regular rhythm and normal heart sounds.   Pulmonary/Chest: Effort normal. She has no decreased breath sounds. She has no wheezes. She has no rhonchi. She has no rales.   Lymphadenopathy:     She has no cervical adenopathy.   Neurological: She is alert and oriented to person, place, and time. She has normal strength. She displays no tremor. No cranial nerve deficit. She displays a negative Romberg sign.   Notes right greater than left facial hyperesthesia   Skin: Skin is warm and dry. No rash noted.   Psychiatric: She has a normal mood and affect.          Patient notes significant improvement/resolution after injectable medicine.     Assessment/Plan:     1. Autonomic facial cephalgia  - SUMAtriptan Succinate (IMITREX) injection 6 mg  - prochlorperazine (COMPAZINE) injection 5 mg  REF NEURO  2. Acute intractable headache, unspecified headache type  CT Head; per radiologist:  No acute hemorrhage, mass-effect, or midline shift.   No intracranial mass or acute ischemia identified.   Ventricles and cisterns are normal.   Gray/white matter differentiation is maintained.    The paranasal sinuses and mastoid air cells are clear.

## 2020-04-01 ENCOUNTER — EH NON-PROVIDER (OUTPATIENT)
Dept: OCCUPATIONAL MEDICINE | Facility: CLINIC | Age: 40
End: 2020-04-01

## 2020-04-01 DIAGNOSIS — Z01.89 RESPIRATORY CLEARANCE EXAMINATION, ENCOUNTER FOR: ICD-10-CM

## 2020-04-01 PROCEDURE — 94375 RESPIRATORY FLOW VOLUME LOOP: CPT | Performed by: PHYSICIAN ASSISTANT

## 2020-06-02 ENCOUNTER — OFFICE VISIT (OUTPATIENT)
Dept: MEDICAL GROUP | Facility: MEDICAL CENTER | Age: 40
End: 2020-06-02
Payer: COMMERCIAL

## 2020-06-02 VITALS
RESPIRATION RATE: 14 BRPM | DIASTOLIC BLOOD PRESSURE: 80 MMHG | HEIGHT: 67 IN | OXYGEN SATURATION: 95 % | SYSTOLIC BLOOD PRESSURE: 114 MMHG | TEMPERATURE: 98.1 F | WEIGHT: 184.4 LBS | HEART RATE: 82 BPM | BODY MASS INDEX: 28.94 KG/M2

## 2020-06-02 DIAGNOSIS — Z12.39 BREAST CANCER SCREENING: ICD-10-CM

## 2020-06-02 DIAGNOSIS — R71.8 ELEVATED HEMATOCRIT: ICD-10-CM

## 2020-06-02 DIAGNOSIS — G43.109 MIGRAINE WITH AURA AND WITHOUT STATUS MIGRAINOSUS, NOT INTRACTABLE: ICD-10-CM

## 2020-06-02 DIAGNOSIS — R23.2 HOT FLASHES: ICD-10-CM

## 2020-06-02 DIAGNOSIS — Z72.0 TOBACCO ABUSE: ICD-10-CM

## 2020-06-02 DIAGNOSIS — F41.1 GENERALIZED ANXIETY DISORDER: ICD-10-CM

## 2020-06-02 DIAGNOSIS — Z23 NEED FOR VACCINATION: ICD-10-CM

## 2020-06-02 DIAGNOSIS — E78.00 HYPERCHOLESTEROLEMIA: ICD-10-CM

## 2020-06-02 PROCEDURE — 90732 PPSV23 VACC 2 YRS+ SUBQ/IM: CPT | Performed by: FAMILY MEDICINE

## 2020-06-02 PROCEDURE — 90471 IMMUNIZATION ADMIN: CPT | Performed by: FAMILY MEDICINE

## 2020-06-02 PROCEDURE — 99204 OFFICE O/P NEW MOD 45 MIN: CPT | Mod: 25 | Performed by: FAMILY MEDICINE

## 2020-06-02 RX ORDER — AMITRIPTYLINE HYDROCHLORIDE 25 MG/1
25 TABLET, FILM COATED ORAL
Qty: 90 TAB | Refills: 1 | Status: SHIPPED | OUTPATIENT
Start: 2020-06-02 | End: 2020-12-09

## 2020-06-02 RX ORDER — RIZATRIPTAN BENZOATE 5 MG/1
5 TABLET ORAL
Qty: 10 TAB | Refills: 0 | Status: SHIPPED | OUTPATIENT
Start: 2020-06-02

## 2020-06-02 RX ORDER — ONDANSETRON 4 MG/1
4 TABLET, FILM COATED ORAL EVERY 4 HOURS PRN
Qty: 20 TAB | Refills: 3 | Status: SHIPPED | OUTPATIENT
Start: 2020-06-02 | End: 2020-07-02

## 2020-06-02 SDOH — HEALTH STABILITY: MENTAL HEALTH: HOW OFTEN DO YOU HAVE A DRINK CONTAINING ALCOHOL?: 2-4 TIMES A MONTH

## 2020-06-02 ASSESSMENT — FIBROSIS 4 INDEX: FIB4 SCORE: 0.9

## 2020-06-02 ASSESSMENT — PATIENT HEALTH QUESTIONNAIRE - PHQ9: CLINICAL INTERPRETATION OF PHQ2 SCORE: 0

## 2020-06-02 NOTE — ASSESSMENT & PLAN NOTE
This is a chronic problem. She started in her teens, smoked 1 ppd. She has quit several times for years at a time. She is working on cutting back. She is currently smoking about 1/2 ppd.

## 2020-06-02 NOTE — ASSESSMENT & PLAN NOTE
This is a chronic condition.  Patient is working on improving her diet and increasing her exercise routine.    Lab Results   Component Value Date/Time    CHOLSTRLTOT 218 (H) 03/14/2019 08:48 AM    CHOLSTRLTOT 218 (H) 03/14/2019 08:48 AM     (H) 03/14/2019 08:48 AM     (H) 03/14/2019 08:48 AM    HDL 57 03/14/2019 08:48 AM    HDL 61 03/14/2019 08:48 AM    TRIGLYCERIDE 113 03/14/2019 08:48 AM    TRIGLYCERIDE 118 03/14/2019 08:48 AM

## 2020-06-02 NOTE — ASSESSMENT & PLAN NOTE
"This is a new problem.  The patient reports intermittent hot flashes for the past few months.  She is status post hysterectomy vaginal hysterectomy in 2009 by Dr. Wilbert Pérez for endometriosis. She reports her surgeon left \"a quarter of one ovary\" however per op report I do not see that her ovaries were removed.   "

## 2020-06-02 NOTE — ASSESSMENT & PLAN NOTE
This is a chronic problem.  Patient reports her anxiety is much better since focusing on stress reduction, exercising and finalizing her divorce.  She saw a counselor previously however feels her symptoms are well controlled at this time and declines referral to behavioral health.

## 2020-06-02 NOTE — ASSESSMENT & PLAN NOTE
This is a chronic condition.  The patient reports she has had migraines for about 20 years.  She has recently experienced an increase in frequency. She previously getting migraines every couple months however recently went through a divorce and is now experiencing migraines about once weekly.  As her migraines are affecting her daily life and job she would like to restart prophylactic medication.  She has tried amitriptyline and Topamax previously and reports amitriptyline worked much better.  She is using Maxalt 5 mg for abortive therapy and reports good efficacy.  She does report associated nausea, vomiting, light sensitivity, and aura.

## 2020-06-02 NOTE — PROGRESS NOTES
Subjective:     CC:  Diagnoses of Migraine with aura and without status migrainosus, not intractable, Hot flashes, Generalized anxiety disorder, Hypercholesterolemia, Tobacco abuse, Elevated hematocrit, Need for vaccination, and Breast cancer screening were pertinent to this visit.    HISTORY OF THE PRESENT ILLNESS: Patient is a 40 y.o. female. She is here today to establish care.  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  She is due for mammogram.  She is status post hysterectomy for endometriosis in 2009 and no longer needs Pap smears.    Migraine with aura and without status migrainosus, not intractable  This is a chronic condition.  The patient reports she has had migraines for about 20 years.  She has recently experienced an increase in frequency. She previously getting migraines every couple months however recently went through a divorce and is now experiencing migraines about once weekly.  As her migraines are affecting her daily life and job she would like to restart prophylactic medication.  She has tried amitriptyline and Topamax previously and reports amitriptyline worked much better.  She is using Maxalt 5 mg for abortive therapy and reports good efficacy.  She does report associated nausea, vomiting, light sensitivity, and aura.    Tobacco abuse  This is a chronic problem. She started in her teens, smoked 1 ppd. She has quit several times for years at a time. She is working on [employer] back. She is currently smoking about 1/2 ppd.     Hypercholesterolemia  This is a chronic condition.  Patient is working on improving her diet and increasing her exercise routine.    Lab Results   Component Value Date/Time    CHOLSTRLTOT 218 (H) 03/14/2019 08:48 AM    CHOLSTRLTOT 218 (H) 03/14/2019 08:48 AM     (H) 03/14/2019 08:48 AM     (H) 03/14/2019 08:48 AM    HDL 57 03/14/2019 08:48 AM    HDL 61 03/14/2019 08:48 AM    TRIGLYCERIDE  "113 03/14/2019 08:48 AM    TRIGLYCERIDE 118 03/14/2019 08:48 AM           Anxiety disorder  This is a chronic problem.  Patient reports her anxiety is much better since focusing on stress reduction, exercising and finalizing her divorce.  She saw a counselor previously however feels her symptoms are well controlled at this time and declines referral to behavioral health.    Hot flashes  This is a new problem.  The patient reports intermittent hot flashes for the past few months.  She is status post hysterectomy vaginal hysterectomy in 2009 by Dr. Wilbert Pérez for endometriosis. She reports her surgeon left \"a quarter of one ovary\" however per op report I do not see that her ovaries were removed.       Allergies: Iodine and Shellfish allergy    Current Outpatient Medications Ordered in Epic   Medication Sig Dispense Refill   • Acetaminophen (TYLENOL PO) Take  by mouth.     • rizatriptan (MAXALT) 5 MG tablet Take 1 Tab by mouth Once PRN for Migraine for up to 1 dose. 10 Tab 0   • ondansetron (ZOFRAN) 4 MG Tab tablet Take 1 Tab by mouth every four hours as needed for Nausea/Vomiting for up to 30 days. 20 Tab 3   • amitriptyline (ELAVIL) 25 MG Tab Take 1 Tab by mouth every bedtime. 90 Tab 1   • asa/apap/caffeine (EXCEDRIN) 250-250-65 MG Tab Take 1 Tab by mouth as needed for Headache.     • loratadine (CLARITIN) 10 MG Tab Take 10 mg by mouth every day.       No current [facility]-ordered facility-administered medications on file.        Past Medical History:   Diagnosis Date   • Anxiety disorder    • Migraine        Past Surgical History:   Procedure Laterality Date   • ANKLE ARTHROSCOPY Left 5/15/2017    Procedure: ANKLE ARTHROSCOPY;  Surgeon: Vaibhav Casper M.D.;  Location: SURGERY [facility];  Service:    • ANKLE LIGAMENT RECONSTRUCTION  5/15/2017    Procedure: ANKLE LIGAMENT RECONSTRUCTION-LATERAL;  Surgeon: Vaibhav Casper M.D.;  Location: SURGERY [facility];  Service:    • VAGINAL HYSTERECTOMY SCOPE TOTAL  " "5/22/2009    Performed by SHELLY GUNDERSON at SURGERY SAME DAY [facility]   • SALPINGECTOMY  5/22/2009    Performed by SHELLY GUNDERSON at SURGERY SAME DAY [facility]S   • ANKLE ARTHROSCOPY Left    • GYN SURGERY  1999,2005    C Section x2    • PRIMARY C SECTION      twice        Social History     Tobacco Use   • Smoking status: Current Every Day Smoker     Packs/day: 0.50     Years: 20.00     Pack years: 10.00     Types: Cigarettes   • Smokeless tobacco: Never Used   Substance Use Topics   • Alcohol use: Yes     Frequency: 2-4 times a month     Comment: occasionally   • Drug use: No       Social History     Social History Narrative   • Not on file       Family History   Problem Relation Age of Onset   • No Known Problems Mother    • Diabetes Father    • Heart Disease Father 50        Heart attack    • Hypertension Father    • Other Sister         migraine       Health Maintenance: See above    ROS:   Gen: no fevers/chills, no changes in weight  Eyes: no changes in vision  ENT: no sore throat or nasal congestion  Pulm: no sob, no cough  CV: no chest pain  GI: no nausea/vomiting, no diarrhea or constipation  : no dysuria  MSk: no myalgias  Skin: no rash  Neuro: Migraines per above  Immuno: no seasonal allergies  Heme/Lymph: no easy bruising  Psych: anxiety per above; no depression or SI      Objective:     Exam: /80 (BP Location: Left arm, Patient Position: Sitting, BP Cuff Size: Adult long)   Pulse 82   Temp 36.7 °C (98.1 °F) (Temporal)   Resp 14   Ht 1.702 m (5' 7\")   Wt 83.6 kg (184 lb 6.4 oz)   SpO2 95%  Body mass index is 28.88 kg/m².    General: Well-developed, well-nourished. Appears stated age.  Eyes: Normocephalic. Conjunctiva clear without injection or scleral icterus. Pupils equal and reactive to light.   HENT: Normocephalic, atraumatic. Ears normal shape and contour, canals are clear bilaterally, tympanic membranes pearly, oropharynx is clear without erythema, edema or exudates.   Neck: " Supple; no obvious thyromegaly or nodules appreciated  Pulmonary: Clear to ausculation bilaterally.  No increased work of breathing. No rales, ronchi, or wheezing.  Cardiovascular: Regular rate and rhythm without murmur.  Abdomen: Soft, nontender, nondistended. Normal bowel sounds. No guarding or rebound tenderness.  Neurologic: CN III-XII intact.  Lymph: No cervical or supraclavicular lymphadenopathy palpated.  Skin: Warm and dry.  No obvious lesions.  Musculoskeletal: Normal gait. No extremity cyanosis, clubbing, or edema.  Psych: Euthymic affect. Alert and oriented x 3. Judgment and insight is normal.      Assessment & Plan:   40 y.o. female with the following -    1. Migraine with aura and without status migrainosus, not intractable  This is a chronic problem.  The patient reports increasing migraine frequency and would like to restart prophylactic amitriptyline.  - rizatriptan (MAXALT) 5 MG tablet; Take 1 Tab by mouth Once PRN for Migraine for up to 1 dose.  Dispense: 10 Tab; Refill: 0  - ondansetron (ZOFRAN) 4 MG Tab tablet; Take 1 Tab by mouth every four hours as needed for Nausea/Vomiting for up to 30 days.  Dispense: 20 Tab; Refill: 3  - amitriptyline (ELAVIL) 25 MG Tab; Take 1 Tab by mouth every bedtime.  Dispense: 90 Tab; Refill: 1  - follow up one month    2. Hot flashes  This is a new problem.  The patient reports hysterectomy with oophorectomy in 2009 however per op report does not appear the patient's ovaries were removed.  - FSH; Future  - TSH WITH REFLEX TO FT4; Future    3. Generalized anxiety disorder  Is a chronic problem.  The patient reports her symptoms are well controlled.  She has seen a counselor in the past however as she is doing well she declines referral to behavioral health at this time.  -Continue to monitor    4. Hypercholesterolemia  This is a chronic problem per chart review.  It is working on improving her diet, exercising, cutting back on tobacco per below.  - Comp Metabolic  Panel; Future  - Lipid Profile; Future  - HEMOGLOBIN A1C; Future    5. Tobacco abuse  This is a chronic problem.  The patient has cut back from a pack a day to 1/2 pack/day.  -Discussed cessation options including nicotine replacement, medications, and support groups.  The patient declines further resources at this time.    6. Elevated hematocrit  Per chart review.  - CBC WITH DIFFERENTIAL; Future    7. Need for vaccination  - Pneumococal Polysaccharide Vaccine 23-Valent =>1YO SQ/IM    8. Breast cancer screening  - MA-SCREENING MAMMO BILAT W/CAD; Future    Other orders  - Acetaminophen (TYLENOL PO); Take  by mouth.      Return in about 1 month (around 7/2/2020) for migraines and labs.    Please note this dictation was created using voice recognition software. I have made every reasonable attempt to correct obvious errors, but I expect there may be errors of grammar, and possibly content, that I did not discover before finalizing the note.

## 2020-06-08 ENCOUNTER — HOSPITAL ENCOUNTER (OUTPATIENT)
Dept: LAB | Facility: MEDICAL CENTER | Age: 40
End: 2020-06-08
Payer: COMMERCIAL

## 2020-06-08 ENCOUNTER — HOSPITAL ENCOUNTER (OUTPATIENT)
Dept: LAB | Facility: MEDICAL CENTER | Age: 40
End: 2020-06-08
Attending: FAMILY MEDICINE
Payer: COMMERCIAL

## 2020-06-08 DIAGNOSIS — R71.8 ELEVATED HEMATOCRIT: ICD-10-CM

## 2020-06-08 DIAGNOSIS — E78.00 HYPERCHOLESTEROLEMIA: ICD-10-CM

## 2020-06-08 DIAGNOSIS — R23.2 HOT FLASHES: ICD-10-CM

## 2020-06-08 LAB
ALBUMIN SERPL BCP-MCNC: 4.4 G/DL (ref 3.2–4.9)
ALBUMIN/GLOB SERPL: 1.6 G/DL
ALP SERPL-CCNC: 82 U/L (ref 30–99)
ALT SERPL-CCNC: 20 U/L (ref 2–50)
ANION GAP SERPL CALC-SCNC: 14 MMOL/L (ref 7–16)
AST SERPL-CCNC: 19 U/L (ref 12–45)
BASOPHILS # BLD AUTO: 0.9 % (ref 0–1.8)
BASOPHILS # BLD: 0.09 K/UL (ref 0–0.12)
BDY FAT % MEASURED: 38 %
BILIRUB SERPL-MCNC: 0.3 MG/DL (ref 0.1–1.5)
BP DIAS: 70 MMHG
BP SYS: 114 MMHG
BUN SERPL-MCNC: 12 MG/DL (ref 8–22)
CALCIUM SERPL-MCNC: 9.1 MG/DL (ref 8.5–10.5)
CHLORIDE SERPL-SCNC: 103 MMOL/L (ref 96–112)
CHOLEST SERPL-MCNC: 192 MG/DL (ref 100–199)
CHOLEST SERPL-MCNC: 194 MG/DL (ref 100–199)
CO2 SERPL-SCNC: 20 MMOL/L (ref 20–33)
CREAT SERPL-MCNC: 0.69 MG/DL (ref 0.5–1.4)
DIABETES HTDIA: NO
EOSINOPHIL # BLD AUTO: 0.64 K/UL (ref 0–0.51)
EOSINOPHIL NFR BLD: 6.1 % (ref 0–6.9)
ERYTHROCYTE [DISTWIDTH] IN BLOOD BY AUTOMATED COUNT: 43.8 FL (ref 35.9–50)
EVENT NAME HTEVT: NORMAL
FASTING HTFAS: YES
FASTING STATUS PATIENT QL REPORTED: NORMAL
FASTING STATUS PATIENT QL REPORTED: NORMAL
GLOBULIN SER CALC-MCNC: 2.8 G/DL (ref 1.9–3.5)
GLUCOSE SERPL-MCNC: 100 MG/DL (ref 65–99)
GLUCOSE SERPL-MCNC: 99 MG/DL (ref 65–99)
HCT VFR BLD AUTO: 47.8 % (ref 37–47)
HDLC SERPL-MCNC: 53 MG/DL
HDLC SERPL-MCNC: 54 MG/DL
HGB BLD-MCNC: 15.5 G/DL (ref 12–16)
HYPERTENSION HTHYP: NO
LDLC SERPL CALC-MCNC: 117 MG/DL
LDLC SERPL CALC-MCNC: 118 MG/DL
LYMPHOCYTES # BLD AUTO: 2.65 K/UL (ref 1–4.8)
LYMPHOCYTES NFR BLD: 25.2 % (ref 22–41)
MANUAL DIFF BLD: NORMAL
MCH RBC QN AUTO: 29.5 PG (ref 27–33)
MCHC RBC AUTO-ENTMCNC: 32.4 G/DL (ref 33.6–35)
MCV RBC AUTO: 91 FL (ref 81.4–97.8)
MONOCYTES # BLD AUTO: 1.01 K/UL (ref 0–0.85)
MONOCYTES NFR BLD AUTO: 9.6 % (ref 0–13.4)
MORPHOLOGY BLD-IMP: NORMAL
NEUTROPHILS # BLD AUTO: 6.12 K/UL (ref 2–7.15)
NEUTROPHILS NFR BLD: 57.4 % (ref 44–72)
NEUTS BAND NFR BLD MANUAL: 0.9 % (ref 0–10)
NRBC # BLD AUTO: 0 K/UL
NRBC BLD-RTO: 0 /100 WBC
PLATELET # BLD AUTO: 239 K/UL (ref 164–446)
PLATELET BLD QL SMEAR: NORMAL
PMV BLD AUTO: 10.6 FL (ref 9–12.9)
POTASSIUM SERPL-SCNC: 4.3 MMOL/L (ref 3.6–5.5)
PROT SERPL-MCNC: 7.2 G/DL (ref 6–8.2)
RBC # BLD AUTO: 5.25 M/UL (ref 4.2–5.4)
RBC BLD AUTO: NORMAL
SCREENING LOC CITY HTCIT: NORMAL
SCREENING LOC STATE HTSTA: NORMAL
SCREENING LOCATION HTLOC: NORMAL
SMOKING HTSMO: NO
SODIUM SERPL-SCNC: 137 MMOL/L (ref 135–145)
SUBSCRIBER ID HTSID: NORMAL
TRIGL SERPL-MCNC: 108 MG/DL (ref 0–149)
TRIGL SERPL-MCNC: 110 MG/DL (ref 0–149)
WBC # BLD AUTO: 10.5 K/UL (ref 4.8–10.8)

## 2020-06-08 PROCEDURE — 85027 COMPLETE CBC AUTOMATED: CPT

## 2020-06-08 PROCEDURE — 80053 COMPREHEN METABOLIC PANEL: CPT

## 2020-06-08 PROCEDURE — 84443 ASSAY THYROID STIM HORMONE: CPT

## 2020-06-08 PROCEDURE — 83001 ASSAY OF GONADOTROPIN (FSH): CPT

## 2020-06-08 PROCEDURE — 80061 LIPID PANEL: CPT | Mod: 91

## 2020-06-08 PROCEDURE — 85007 BL SMEAR W/DIFF WBC COUNT: CPT

## 2020-06-08 PROCEDURE — 80061 LIPID PANEL: CPT

## 2020-06-08 PROCEDURE — 82947 ASSAY GLUCOSE BLOOD QUANT: CPT

## 2020-06-08 PROCEDURE — 36415 COLL VENOUS BLD VENIPUNCTURE: CPT

## 2020-06-08 PROCEDURE — 83036 HEMOGLOBIN GLYCOSYLATED A1C: CPT

## 2020-06-08 PROCEDURE — S5190 WELLNESS ASSESSMENT BY NONPH: HCPCS

## 2020-06-09 ENCOUNTER — HOSPITAL ENCOUNTER (OUTPATIENT)
Dept: RADIOLOGY | Facility: MEDICAL CENTER | Age: 40
End: 2020-06-09
Attending: FAMILY MEDICINE
Payer: COMMERCIAL

## 2020-06-09 DIAGNOSIS — Z12.31 VISIT FOR SCREENING MAMMOGRAM: ICD-10-CM

## 2020-06-09 LAB
EST. AVERAGE GLUCOSE BLD GHB EST-MCNC: 111 MG/DL
FSH SERPL-ACNC: 80.3 MIU/ML
HBA1C MFR BLD: 5.5 % (ref 0–5.6)
TSH SERPL DL<=0.005 MIU/L-ACNC: 1.94 UIU/ML (ref 0.38–5.33)

## 2020-06-09 PROCEDURE — 77067 SCR MAMMO BI INCL CAD: CPT

## 2020-07-06 ENCOUNTER — TELEMEDICINE (OUTPATIENT)
Dept: MEDICAL GROUP | Facility: MEDICAL CENTER | Age: 40
End: 2020-07-06
Payer: COMMERCIAL

## 2020-07-06 VITALS — HEIGHT: 67 IN | BODY MASS INDEX: 28.88 KG/M2 | WEIGHT: 184 LBS | TEMPERATURE: 97.1 F

## 2020-07-06 DIAGNOSIS — Z72.0 TOBACCO ABUSE: ICD-10-CM

## 2020-07-06 DIAGNOSIS — G43.109 MIGRAINE WITH AURA AND WITHOUT STATUS MIGRAINOSUS, NOT INTRACTABLE: ICD-10-CM

## 2020-07-06 DIAGNOSIS — E78.00 HYPERCHOLESTEROLEMIA: ICD-10-CM

## 2020-07-06 PROBLEM — F41.9 ANXIETY DISORDER: Status: RESOLVED | Noted: 2019-03-11 | Resolved: 2020-07-06

## 2020-07-06 PROBLEM — R23.2 HOT FLASHES: Status: RESOLVED | Noted: 2020-06-02 | Resolved: 2020-07-06

## 2020-07-06 PROCEDURE — 99214 OFFICE O/P EST MOD 30 MIN: CPT | Mod: 95,CR | Performed by: FAMILY MEDICINE

## 2020-07-06 ASSESSMENT — FIBROSIS 4 INDEX: FIB4 SCORE: 0.75

## 2020-07-06 NOTE — ASSESSMENT & PLAN NOTE
This is a chronic problem.  The patient started smoking in her teens and has smoked 1 pack/day until a couple months ago when she started cutting down.  She is now smoking about 1/2 pack/day. She would like to work on cessation independently declines nicotine replacement, Wellbutrin, Chantix, or counseling.

## 2020-07-06 NOTE — ASSESSMENT & PLAN NOTE
This is a chronic condition.  The patient's most recent labs demonstrate elevated LDL per below. Her LDL has improved from 133 3/2019. Her father had an MI at age 51yo.    Lab Results   Component Value Date/Time    CHOLSTRLTOT 194 06/08/2020 10:07 AM     (H) 06/08/2020 10:07 AM    HDL 54 06/08/2020 10:07 AM    TRIGLYCERIDE 110 06/08/2020 10:07 AM

## 2020-07-06 NOTE — ASSESSMENT & PLAN NOTE
This is a chronic problem.  The patient reports her migraines have significantly improved status post starting amitriptyline 25 mg nightly.  She was having at least one migraine per week prior to starting the medication and is now having one migraine per month.

## 2020-07-06 NOTE — PROGRESS NOTES
Telemedicine Visit: Established Patient     This encounter was conducted via [employer]   Verbal consent was obtained. Patient's identity was verified.    Subjective:   CC: f/u migraines and labs  Alondra Huerta is a 40 y.o. female presenting for evaluation and management of:    Migraine with aura and without status migrainosus, not intractable  This is a chronic problem.  The patient reports her migraines have significantly improved status post starting amitriptyline 25 mg nightly.  She was having at least one migraine per week prior to starting the medication and is now having one migraine per month.     Hypercholesterolemia  This is a chronic condition.  The patient's most recent labs demonstrate elevated LDL per below. Her LDL has improved from 133 3/2019. Her father had an MI at age 51yo.    Lab Results   Component Value Date/Time    CHOLSTRLTOT 194 06/08/2020 10:07 AM     (H) 06/08/2020 10:07 AM    HDL 54 06/08/2020 10:07 AM    TRIGLYCERIDE 110 06/08/2020 10:07 AM           Tobacco abuse  This is a chronic problem.  The patient started smoking in her teens and has smoked 1 pack/day until a couple months ago when she started cutting down.  She is now smoking about 1/2 pack/day. She would like to work on cessation independently declines nicotine replacement, Wellbutrin, Chantix, or counseling.      ROS   Denies any recent fevers or chills. No nausea or vomiting. No chest pains or shortness of breath.     Allergies   Allergen Reactions   • Iodine Anaphylaxis and Shortness of Breath     IV contrast    • Shellfish Allergy Anaphylaxis       Current medicines (including changes today)  Current Outpatient Medications   Medication Sig Dispense Refill   • Acetaminophen (TYLENOL PO) Take  by mouth.     • rizatriptan (MAXALT) 5 MG tablet Take 1 Tab by mouth Once PRN for Migraine for up to 1 dose. 10 Tab 0   • amitriptyline (ELAVIL) 25 MG Tab Take 1 Tab by mouth every bedtime. 90 Tab 1   • loratadine (CLARITIN) 10  "MG Tab Take 10 mg by mouth every day.     • asa/apap/caffeine (EXCEDRIN) 250-250-65 MG Tab Take 1 Tab by mouth as needed for Headache.       No current facility-administered medications for this visit.        Patient Active Problem List    Diagnosis Date Noted   • Hypercholesterolemia 04/15/2019   • Migraine with aura and without status migrainosus, not intractable 03/11/2019   • Tobacco abuse 03/11/2019       Family History   Problem Relation Age of Onset   • No Known Problems Mother    • Diabetes Father    • Heart Disease Father 50        Heart attack    • Hypertension Father    • Other Sister         migraine       She  has a past medical history of Anxiety disorder, Anxiety disorder (3/11/2019), Hot flashes (6/2/2020), and Migraine.  She  has a past surgical history that includes gyn surgery (1999,2005); ankle arthroscopy (Left, 5/15/2017); ankle ligament reconstruction (5/15/2017); vaginal hysterectomy scope total (5/22/2009); salpingectomy (5/22/2009); primary c section; and ankle arthroscopy (Left).       Objective:   Temp 36.2 °C (97.1 °F) (Temporal) Comment: pt. reported  Ht 1.702 m (5' 7\") Comment: pt. reported  Wt 83.5 kg (184 lb) Comment: pt. reported  BMI 28.82 kg/m²     Physical Exam:  Constitutional: Alert, no distress, well-groomed.  Skin: No rashes in visible areas.  Eye: Round. Conjunctiva clear, lids normal. No icterus.   ENMT: Lips pink without lesions, good dentition, moist mucous membranes. Phonation normal.  Respiratory: Unlabored respiratory effort, no cough or audible wheeze  Psych: Alert and oriented x3, normal affect and mood.       Assessment and Plan:   The following treatment plan was discussed:     1. Migraine with aura and without status migrainosus, not intractable  This is a chronic condition.  The patient reports significant improvement in her migraines status post starting amitriptyline 25 mg nightly.  -Continue current regimen    2. Hypercholesterolemia  This is a chronic " problem.  The patient's cholesterol has improved over the last year however her LDL does remain mildly elevated.  -Encouraged patient to continue decreasing processed foods, red meat, and refined carbs  -Encouraged patient to continue working on smoking cessation to mitigate cardiovascular risk    3. Tobacco abuse  This is a chronic problem. The patient is working on cessation and wishes to continue this independently, see HPI.  -Smoking cessation counseling and options provided      Follow-up: Return in about 6 months (around 1/6/2021).

## 2020-08-04 ENCOUNTER — TELEMEDICINE (OUTPATIENT)
Dept: MEDICAL GROUP | Facility: MEDICAL CENTER | Age: 40
End: 2020-08-04
Payer: COMMERCIAL

## 2020-08-04 VITALS — HEIGHT: 67 IN | WEIGHT: 183 LBS | TEMPERATURE: 97.1 F | BODY MASS INDEX: 28.72 KG/M2

## 2020-08-04 DIAGNOSIS — G43.109 MIGRAINE WITH AURA AND WITHOUT STATUS MIGRAINOSUS, NOT INTRACTABLE: ICD-10-CM

## 2020-08-04 DIAGNOSIS — E78.00 HYPERCHOLESTEROLEMIA: ICD-10-CM

## 2020-08-04 DIAGNOSIS — Z72.0 TOBACCO ABUSE: ICD-10-CM

## 2020-08-04 PROCEDURE — 99214 OFFICE O/P EST MOD 30 MIN: CPT | Mod: 95,CR | Performed by: FAMILY MEDICINE

## 2020-08-04 ASSESSMENT — FIBROSIS 4 INDEX: FIB4 SCORE: 0.75

## 2020-08-04 NOTE — ASSESSMENT & PLAN NOTE
This is a chronic problem.  The patient reports her migraines have significantly improved status post starting amitriptyline 25 mg nightly.  She was having at least one migraine per week prior to starting the medication and is now having one migraine per month. When her migraines occur they continue to be quite intense. She has missed work twice in two months and requests [facility] paperwork. Her symptoms are aborted with maxalt 5mg and sleep.

## 2020-08-04 NOTE — PROGRESS NOTES
Telemedicine Visit: Established Patient     This encounter was conducted via [employer]   Verbal consent was obtained. Patient's identity was verified.    Subjective:   CC: [facility] paperwork for migraines  Alondra Huerta is a 40 y.o. female presenting for evaluation and management of:    Migraine with aura and without status migrainosus, not intractable  This is a chronic problem.  The patient reports her migraines have significantly improved status post starting amitriptyline 25 mg nightly.  She was having at least one migraine per week prior to starting the medication and is now having one migraine per month. When her migraines occur they continue to be quite intense. She has missed work twice in two months and requests FMLA paperwork. Her symptoms are aborted with maxalt 5mg and sleep.    Tobacco abuse  This is a chronic problem.  The patient started smoking in her teens and has smoked 1 pack/day until a couple months ago when she started cutting down.  She is now smoking about 1/2 pack/day. She would like to work on cessation independently declines nicotine replacement, Wellbutrin, Chantix, or counseling.    Hypercholesterolemia  This is a chronic condition.  The patient's most recent labs demonstrate elevated LDL per below. Her LDL has improved from 133 3/2019. Her father had an MI at age 51yo. She is motivated and working on improving her diet, decreasing processed foods and red meat intake.  She is also started exercising regularly.    Lab Results   Component Value Date/Time    CHOLSTRLTOT 194 06/08/2020 10:07 AM     (H) 06/08/2020 10:07 AM    HDL 54 06/08/2020 10:07 AM    TRIGLYCERIDE 110 06/08/2020 10:07 AM             ROS   Denies any recent fevers or chills. No nausea or vomiting. No chest pains or shortness of breath.     Allergies   Allergen Reactions   • Iodine Anaphylaxis and Shortness of Breath     IV contrast    • Shellfish Allergy Anaphylaxis       Current medicines (including changes  "today)  Current Outpatient Medications   Medication Sig Dispense Refill   • Acetaminophen (TYLENOL PO) Take  by mouth.     • rizatriptan (MAXALT) 5 MG tablet Take 1 Tab by mouth Once PRN for Migraine for up to 1 dose. 10 Tab 0   • amitriptyline (ELAVIL) 25 MG Tab Take 1 Tab by mouth every bedtime. 90 Tab 1   • asa/apap/caffeine (EXCEDRIN) 250-250-65 MG Tab Take 1 Tab by mouth as needed for Headache.     • loratadine (CLARITIN) 10 MG Tab Take 10 mg by mouth every day.       No current facility-administered medications for this visit.        Patient Active Problem List    Diagnosis Date Noted   • Hypercholesterolemia 04/15/2019   • Migraine with aura and without status migrainosus, not intractable 03/11/2019   • Tobacco abuse 03/11/2019       Family History   Problem Relation Age of Onset   • No Known Problems Mother    • Diabetes Father    • Heart Disease Father 50        Heart attack    • Hypertension Father    • Other Sister         migraine       She  has a past medical history of Anxiety disorder, Anxiety disorder (3/11/2019), Hot flashes (6/2/2020), and Migraine.  She  has a past surgical history that includes gyn surgery (1999,2005); ankle arthroscopy (Left, 5/15/2017); ankle ligament reconstruction (5/15/2017); vaginal hysterectomy scope total (5/22/2009); salpingectomy (5/22/2009); primary c section; and ankle arthroscopy (Left).       Objective:   Temp 36.2 °C (97.1 °F) (Temporal) Comment: pt. reported Comment (Src): pt. reported  Ht 1.702 m (5' 7\") Comment: pt. reported  Wt 83 kg (183 lb) Comment: pt. reported  BMI 28.66 kg/m²     Physical Exam:  Constitutional: Alert, no distress, well-groomed.  Skin: No rashes in visible areas.  Eye: Round. Conjunctiva clear, lids normal. No icterus.   ENMT: Lips pink without lesions, good dentition, moist mucous membranes. Phonation normal.  Respiratory: Unlabored respiratory effort, no cough or audible wheeze  Psych: Alert and oriented x3, normal affect and mood. "       Assessment and Plan:   The following treatment plan was discussed:     1. Migraine with aura and without status migrainosus, not intractable  This is a chronic problem.  Her migraine frequency has significantly decreased s/p starting amitriptyline 25 mg nightly.  She is experiencing about 1 migraine monthly.  As her migraines can be intense and require sleep she is requesting FMLA paperwork today.  -Continue amitriptyline 25 mg nightly  -Continue Maxalt 5 mg as needed  -FMLA paperwork completed    2. Tobacco abuse  This is a chronic problem.  The patient has cut back from a pack a day to 1/2 pack/day.  -Discussed cessation options including nicotine replacement, medications, and support groups.  The patient declines further resources at this time.    3. Hypercholesterolemia  This is a chronic problem. The patient is working diligently on improving her diet, increasing her exercise, and weight loss.  - Continue current regimen      Follow-up: Return in about 6 months (around 2/4/2021) for migraines, tobacco use, DLD.

## 2020-08-04 NOTE — ASSESSMENT & PLAN NOTE
This is a chronic condition.  The patient's most recent labs demonstrate elevated LDL per below. Her LDL has improved from 133 3/2019. Her father had an MI at age 51yo. She is motivated and working on improving her diet, decreasing processed foods and red meat intake.  She is also started exercising regularly.    Lab Results   Component Value Date/Time    CHOLSTRLTOT 194 06/08/2020 10:07 AM     (H) 06/08/2020 10:07 AM    HDL 54 06/08/2020 10:07 AM    TRIGLYCERIDE 110 06/08/2020 10:07 AM

## 2020-09-16 ENCOUNTER — PATIENT MESSAGE (OUTPATIENT)
Dept: MEDICAL GROUP | Facility: MEDICAL CENTER | Age: 40
End: 2020-09-16

## 2020-09-16 RX ORDER — ALBUTEROL SULFATE 90 UG/1
2 AEROSOL, METERED RESPIRATORY (INHALATION) EVERY 4 HOURS PRN
Qty: 1 EACH | Refills: 6 | Status: SHIPPED | OUTPATIENT
Start: 2020-09-16 | End: 2023-03-20

## 2020-09-16 NOTE — TELEPHONE ENCOUNTER
From: Alondra Huerta  To: Yudy Urrutia M.D.  Sent: 9/16/2020 10:16 AM PDT  Subject: Prescription Question    Good Morning. I haven't need a rescue inhaler in a long time. This last week has been really bad for me. Last night I started wheezing and I'm having a hard time catching my breath. I was wondering if you could call in an Albuterol Inhaler for me to  at [facility]?   Thank You,   Alondra

## 2020-09-25 ENCOUNTER — IMMUNIZATION (OUTPATIENT)
Dept: OCCUPATIONAL MEDICINE | Facility: CLINIC | Age: 40
End: 2020-09-25

## 2020-09-25 DIAGNOSIS — Z23 NEED FOR VACCINATION: ICD-10-CM

## 2020-09-25 PROCEDURE — 90686 IIV4 VACC NO PRSV 0.5 ML IM: CPT | Performed by: NURSE PRACTITIONER

## 2020-12-18 ENCOUNTER — PATIENT MESSAGE (OUTPATIENT)
Dept: MEDICAL GROUP | Facility: MEDICAL CENTER | Age: 40
End: 2020-12-18

## 2020-12-18 RX ORDER — POLYMYXIN B SULFATE AND TRIMETHOPRIM 1; 10000 MG/ML; [USP'U]/ML
1 SOLUTION OPHTHALMIC EVERY 4 HOURS
Qty: 10 ML | Refills: 0 | Status: SHIPPED | OUTPATIENT
Start: 2020-12-18 | End: 2020-12-28

## 2020-12-20 DIAGNOSIS — Z23 NEED FOR VACCINATION: ICD-10-CM

## 2020-12-29 ENCOUNTER — IMMUNIZATION (OUTPATIENT)
Dept: FAMILY PLANNING/WOMEN'S HEALTH CLINIC | Facility: IMMUNIZATION CENTER | Age: 40
End: 2020-12-29
Attending: FAMILY MEDICINE
Payer: COMMERCIAL

## 2020-12-29 DIAGNOSIS — Z23 ENCOUNTER FOR VACCINATION: Primary | ICD-10-CM

## 2020-12-29 DIAGNOSIS — Z23 NEED FOR VACCINATION: ICD-10-CM

## 2020-12-29 PROCEDURE — 91301 MODERNA SARS-COV-2 VACCINE: CPT

## 2020-12-29 PROCEDURE — 0011A MODERNA SARS-COV-2 VACCINE: CPT

## 2021-01-06 ENCOUNTER — TELEMEDICINE (OUTPATIENT)
Dept: MEDICAL GROUP | Facility: MEDICAL CENTER | Age: 41
End: 2021-01-06
Payer: COMMERCIAL

## 2021-01-06 VITALS — BODY MASS INDEX: 32.65 KG/M2 | HEIGHT: 67 IN | WEIGHT: 208 LBS

## 2021-01-06 DIAGNOSIS — G43.109 MIGRAINE WITH AURA AND WITHOUT STATUS MIGRAINOSUS, NOT INTRACTABLE: ICD-10-CM

## 2021-01-06 PROCEDURE — 99213 OFFICE O/P EST LOW 20 MIN: CPT | Mod: 95,CR | Performed by: FAMILY MEDICINE

## 2021-01-06 RX ORDER — TOPIRAMATE SPINKLE 25 MG/1
25 CAPSULE ORAL 2 TIMES DAILY
Qty: 60 CAP | Refills: 3 | Status: SHIPPED | OUTPATIENT
Start: 2021-01-06 | End: 2021-04-16 | Stop reason: SDUPTHER

## 2021-01-06 RX ORDER — AMITRIPTYLINE HYDROCHLORIDE 10 MG/1
10 TABLET, FILM COATED ORAL NIGHTLY PRN
Qty: 30 TAB | Refills: 0 | Status: SHIPPED | OUTPATIENT
Start: 2021-01-06 | End: 2021-08-05

## 2021-01-06 SDOH — HEALTH STABILITY: MENTAL HEALTH: HOW OFTEN DO YOU HAVE A DRINK CONTAINING ALCOHOL?: MONTHLY OR LESS

## 2021-01-06 SDOH — HEALTH STABILITY: MENTAL HEALTH: HOW MANY STANDARD DRINKS CONTAINING ALCOHOL DO YOU HAVE ON A TYPICAL DAY?: 1 OR 2

## 2021-01-06 ASSESSMENT — FIBROSIS 4 INDEX: FIB4 SCORE: 0.75

## 2021-01-06 ASSESSMENT — PATIENT HEALTH QUESTIONNAIRE - PHQ9: CLINICAL INTERPRETATION OF PHQ2 SCORE: 0

## 2021-01-06 NOTE — ASSESSMENT & PLAN NOTE
The patient reports her migraines have been very well controlled since increasing her amitriptyline from 25 mg to 50 mg nightly however she has been experiencing intermittent episodes of dizziness and has gained 20 pounds since increasing her dose.  She would like to stop this medication and try an alternative treatment.

## 2021-01-06 NOTE — PROGRESS NOTES
Telemedicine Visit: Established Patient     [facility] using secure and encrypted videoconferencing technology. The patient was in a private location in the state of [place].    The patient's identity was confirmed and verbal consent was obtained for this virtual visit.    Subjective:   CC: migraines, medication change  Alondra Huerta is a 40 y.o. female presenting for evaluation and management of:    Migraine with aura and without status migrainosus, not intractable  The patient reports her migraines have been very well controlled since increasing her amitriptyline from 25 mg to 50 mg nightly however she has been experiencing intermittent episodes of dizziness and has gained 20 pounds since increasing her dose.  She would like to stop this medication and try an alternative treatment.      ROS   Denies any recent fevers or chills. No nausea or vomiting. No chest pains or shortness of breath.     Allergies   Allergen Reactions   • Iodine Anaphylaxis and Shortness of Breath     IV contrast    • Shellfish Allergy Anaphylaxis       Current medicines (including changes today)  Current Outpatient Medications   Medication Sig Dispense Refill   • topiramate (TOPAMAX) 25 MG capsule Take 1 Cap by mouth 2 times a day. Start with 1 tab once daily X 1 wk, then increase to 1 tab twice daily 60 Cap 3   • amitriptyline (ELAVIL) 10 MG Tab Take 1 Tab by mouth at bedtime as needed. 30 Tab 0   • amitriptyline (ELAVIL) 25 MG Tab TAKE ONE TABLET BY MOUTH EVERY NIGHT AT BEDTIME (Patient taking differently: twice) 90 Tab 1   • albuterol 108 (90 Base) MCG/ACT Aero Soln inhalation aerosol Inhale 2 Puffs by mouth every four hours as needed for Shortness of Breath. 1 Each 6   • Acetaminophen (TYLENOL PO) Take  by mouth.     • rizatriptan (MAXALT) 5 MG tablet Take 1 Tab by mouth Once PRN for Migraine for up to 1 dose. 10 Tab 0   • loratadine (CLARITIN) 10 MG Tab Take 10 mg by mouth every day.     •  "asa/apap/caffeine (EXCEDRIN) 250-250-65 MG Tab Take 1 Tab by mouth as needed for Headache.       No current facility-administered medications for this visit.        Patient Active Problem List    Diagnosis Date Noted   • Hypercholesterolemia 04/15/2019   • Migraine with aura and without status migrainosus, not intractable 03/11/2019   • Tobacco abuse 03/11/2019       Family History   Problem Relation Age of Onset   • No Known Problems Mother    • Diabetes Father    • Heart Disease Father 50        Heart attack    • Hypertension Father    • Other Sister         migraine       She  has a past medical history of Anxiety disorder, Anxiety disorder (3/11/2019), Hot flashes (6/2/2020), and Migraine.  She  has a past surgical history that includes gyn surgery (1999,2005); ankle arthroscopy (Left, 5/15/2017); ankle ligament reconstruction (5/15/2017); vaginal hysterectomy scope total (5/22/2009); salpingectomy (5/22/2009); primary c section; and ankle arthroscopy (Left).       Objective:   Ht 1.702 m (5' 7\") Comment: pt. reported  Wt 94.3 kg (208 lb) Comment: pt. reported  BMI 32.58 kg/m²     Physical Exam:  Constitutional: Alert, no distress, well-groomed.  Skin: No rashes in visible areas.  Eye: Round. Conjunctiva clear, lids normal. No icterus.   ENMT: Lips pink without lesions, good dentition, moist mucous membranes. Phonation normal.  Respiratory: Unlabored respiratory effort, no cough or audible wheeze  Psych: Alert and oriented x3, normal affect and mood.       Assessment and Plan:   The following treatment plan was discussed:     1. Migraine with aura and without status migrainosus, not intractable  The patient has experienced side effects, see HPI, after increasing her amitriptyline dose to 50 mg nightly.  We will taper amitriptyline and start Topamax.      - Recommended the patient decrease her amitriptyline to 25 mg nightly for 1 week, then 10 mg nightly for 1 week, then 10 mg every other night for 1 week, then " stop.  - Instructed the patient to start Topamax 25 mg once daily for 1 week then increase to twice daily.  - Follow up 1 month    Other orders  - topiramate (TOPAMAX) 25 MG capsule; Take 1 Cap by mouth 2 times a day. Start with 1 tab once daily X 1 wk, then increase to 1 tab twice daily  Dispense: 60 Cap; Refill: 3  - amitriptyline (ELAVIL) 10 MG Tab; Take 1 Tab by mouth at bedtime as needed.  Dispense: 30 Tab; Refill: 0        Follow-up: Return in about 1 month (around 2/6/2021) for f/u medication changes and migraines.

## 2021-01-30 PROCEDURE — 91301 MODERNA SARS-COV-2 VACCINE: CPT

## 2021-01-30 PROCEDURE — 0012A MODERNA SARS-COV-2 VACCINE: CPT

## 2021-01-31 ENCOUNTER — IMMUNIZATION (OUTPATIENT)
Dept: FAMILY PLANNING/WOMEN'S HEALTH CLINIC | Facility: IMMUNIZATION CENTER | Age: 41
End: 2021-01-31
Payer: COMMERCIAL

## 2021-01-31 DIAGNOSIS — Z23 ENCOUNTER FOR VACCINATION: Primary | ICD-10-CM

## 2021-03-12 DIAGNOSIS — Z00.6 RESEARCH STUDY PATIENT: ICD-10-CM

## 2021-03-15 ENCOUNTER — HOSPITAL ENCOUNTER (OUTPATIENT)
Facility: MEDICAL CENTER | Age: 41
End: 2021-03-15
Attending: PATHOLOGY
Payer: COMMERCIAL

## 2021-03-16 DIAGNOSIS — Z00.6 RESEARCH STUDY PATIENT: ICD-10-CM

## 2021-03-26 LAB
ELF SCORE: 7.8
RELATIVE RISK: NORMAL
RISK GROUP: NORMAL
RISK: 3.3 %

## 2021-08-05 ENCOUNTER — OFFICE VISIT (OUTPATIENT)
Dept: URGENT CARE | Facility: PHYSICIAN GROUP | Age: 41
End: 2021-08-05
Payer: COMMERCIAL

## 2021-08-05 VITALS
RESPIRATION RATE: 18 BRPM | OXYGEN SATURATION: 98 % | DIASTOLIC BLOOD PRESSURE: 80 MMHG | BODY MASS INDEX: 30.29 KG/M2 | HEART RATE: 81 BPM | SYSTOLIC BLOOD PRESSURE: 122 MMHG | TEMPERATURE: 97 F | WEIGHT: 193 LBS | HEIGHT: 67 IN

## 2021-08-05 DIAGNOSIS — M26.609 TMJ (TEMPOROMANDIBULAR JOINT SYNDROME): ICD-10-CM

## 2021-08-05 DIAGNOSIS — J30.9 ALLERGIC RHINITIS, UNSPECIFIED SEASONALITY, UNSPECIFIED TRIGGER: ICD-10-CM

## 2021-08-05 PROCEDURE — 99213 OFFICE O/P EST LOW 20 MIN: CPT | Performed by: EMERGENCY MEDICINE

## 2021-08-05 RX ORDER — GUAIFENESIN 600 MG/1
600 TABLET, EXTENDED RELEASE ORAL EVERY 12 HOURS
COMMUNITY

## 2021-08-05 ASSESSMENT — ENCOUNTER SYMPTOMS
FEVER: 0
CHILLS: 0
SPUTUM PRODUCTION: 0
MYALGIAS: 0
COUGH: 1
SWOLLEN GLANDS: 0
HEADACHES: 1
SORE THROAT: 0
NAUSEA: 0
VOMITING: 0
SINUS PAIN: 1
SHORTNESS OF BREATH: 0
SINUS PRESSURE: 1
DIARRHEA: 0

## 2021-08-05 ASSESSMENT — FIBROSIS 4 INDEX: FIB4 SCORE: 0.76

## 2021-08-06 NOTE — PATIENT INSTRUCTIONS
Use saline nasal irrigation, such as with a Neti Pot, as needed daily for relief of nasal or sinus congestion relief.  Use over-the-counter inhaled nasal steroid (Flonase, Nasonex, Rhinocort, Nasacort) daily; continue for at least 2-3 weeks.  Use over-the-counter levocetirizine (Xyxal), cetirizine (Zyrtec), fexofenadine (Allegra), or loratadine (Claritin) as needed for relief of symptoms.  Allergic Rhinitis, Adult  Allergic rhinitis is an allergic reaction that affects the mucous membrane inside the nose. It causes sneezing, a runny or stuffy nose, and the feeling of mucus going down the back of the throat (postnasal drip). Allergic rhinitis can be mild to severe.  There are two types of allergic rhinitis:  · Seasonal. This type is also called hay fever. It happens only during certain seasons.  · Perennial. This type can happen at any time of the year.  What are the causes?  This condition happens when the body's defense system (immune system) responds to certain harmless substances called allergens as though they were germs.   Seasonal allergic rhinitis is triggered by pollen, which can come from grasses, trees, and weeds. Perennial allergic rhinitis may be caused by:  · House dust mites.  · Pet dander.  · Mold spores.  What are the signs or symptoms?  Symptoms of this condition include:  · Sneezing.  · Runny or stuffy nose (nasal congestion).  · Postnasal drip.  · Itchy nose.  · Tearing of the eyes.  · Trouble sleeping.  · Daytime sleepiness.  How is this diagnosed?  This condition may be diagnosed based on:  · Your medical history.  · A physical exam.  · Tests to check for related conditions, such as:  ? Asthma.  ? Pink eye.  ? Ear infection.  ? Upper respiratory infection.  · Tests to find out which allergens trigger your symptoms. These may include skin or blood tests.  How is this treated?  There is no cure for this condition, but treatment can help control symptoms. Treatment may include:  · Taking medicines  that block allergy symptoms, such as antihistamines. Medicine may be given as a shot, nasal spray, or pill.  · Avoiding the allergen.  · Desensitization. This treatment involves getting ongoing shots until your body becomes less sensitive to the allergen. This treatment may be done if other treatments do not help.  · If taking medicine and avoiding the allergen does not work, new, stronger medicines may be prescribed.  Follow these instructions at home:  · Find out what you are allergic to. Common allergens include smoke, dust, and pollen.  · Avoid the things you are allergic to. These are some things you can do to help avoid allergens:  ? Replace carpet with wood, tile, or vinyl rohan. Carpet can trap dander and dust.  ? Do not smoke. Do not allow smoking in your home.  ? Change your heating and air conditioning filter at least once a month.  ? During allergy season:  § Keep windows closed as much as possible.  § Plan outdoor activities when pollen counts are lowest. This is usually during the evening hours.  § When coming indoors, change clothing and shower before sitting on furniture or bedding.  · Take over-the-counter and prescription medicines only as told by your health care provider.  · Keep all follow-up visits as told by your health care provider. This is important.  Contact a health care provider if:  · You have a fever.  · You develop a persistent cough.  · You make whistling sounds when you breathe (you wheeze).  · Your symptoms interfere with your normal daily activities.  Get help right away if:  · You have shortness of breath.  Summary  · This condition can be managed by taking medicines as directed and avoiding allergens.  · Contact your health care provider if you develop a persistent cough or fever.  · During allergy season, keep windows closed as much as possible.  This information is not intended to replace advice given to you by your health care provider. Make sure you discuss any questions  you have with your health care provider.  Document Released: 09/12/2002 Document Revised: 11/30/2018 Document Reviewed: 01/25/2018  Elsevier Patient Education © 2020 [employer]  Temporomandibular Joint Syndrome    Temporomandibular joint syndrome (TMJ syndrome) is a condition that causes pain in the temporomandibular joints. These joints are located near your ears and allow your jaw to open and close. For people with TMJ syndrome, chewing, biting, or other movements of the jaw can be difficult or painful.  TMJ syndrome is often mild and goes away within a few weeks. However, sometimes the condition becomes a long-term (chronic) problem.  What are the causes?  This condition may be caused by:  · Grinding your teeth or clenching your jaw. Some people do this when they are under stress.  · Arthritis.  · Injury to the jaw.  · Head or neck injury.  · Teeth or dentures that are not aligned well.  In some cases, the cause of TMJ syndrome may not be known.  What are the signs or symptoms?  The most common symptom of this condition is an aching pain on the side of the head in the area of the TMJ. Other symptoms may include:  · Pain when moving your jaw, such as when chewing or biting.  · Being unable to open your jaw all the way.  · Making a clicking sound when you open your mouth.  · Headache.  · Earache.  · Neck or shoulder pain.  How is this diagnosed?  This condition may be diagnosed based on:  · Your symptoms and medical history.  · A physical exam. Your health care provider may check the range of motion of your jaw.  · Imaging tests, such as X-rays or an MRI.  You may also need to see your dentist, who will determine if your teeth and jaw are lined up correctly.  How is this treated?  TMJ syndrome often goes away on its own. If treatment is needed, the options may include:  · Eating soft foods and applying ice or heat.  · Medicines to relieve pain or inflammation.  · Medicines or massage to relax the muscles.  · A  splint, bite plate, or mouthpiece to prevent teeth grinding or jaw clenching.  · Relaxation techniques or counseling to help reduce stress.  · A therapy for pain in which an electrical current is applied to the nerves through the skin (transcutaneous electrical nerve stimulation).  · Acupuncture. This is sometimes helpful to relieve pain.  · Jaw surgery. This is rarely needed.  Follow these instructions at home:    Eating and drinking  · Eat a soft diet if you are having trouble chewing.  · Avoid foods that require a lot of chewing. Do not chew gum.  General instructions  · Take over-the-counter and prescription medicines only as told by your health care provider.  · If directed, put ice on the painful area.  ? Put ice in a plastic bag.  ? Place a towel between your skin and the bag.  ? Leave the ice on for 20 minutes, 2-3 times a day.  · Apply a warm, wet cloth (warm compress) to the painful area as directed.  · Massage your jaw area and do any jaw stretching exercises as told by your health care provider.  · If you were given a splint, bite plate, or mouthpiece, wear it as told by your health care provider.  · Keep all follow-up visits as told by your health care provider. This is important.  Contact a health care provider if:  · You are having trouble eating.  · You have new or worsening symptoms.  Get help right away if:  · Your jaw locks open or closed.  Summary  · Temporomandibular joint syndrome (TMJ syndrome) is a condition that causes pain in the temporomandibular joints. These joints are located near your ears and allow your jaw to open and close.  · TMJ syndrome is often mild and goes away within a few weeks. However, sometimes the condition becomes a long-term (chronic) problem.  · Symptoms include an aching pain on the side of the head in the area of the TMJ, pain when chewing or biting, and being unable to open your jaw all the way. You may also make a clicking sound when you open your mouth.  · TMJ  syndrome often goes away on its own. If treatment is needed, it may include medicines to relieve pain, reduce inflammation, or relax the muscles. A splint, bite plate, or mouthpiece may also be used to prevent teeth grinding or jaw clenching.  This information is not intended to replace advice given to you by your health care provider. Make sure you discuss any questions you have with your health care provider.  Document Released: 09/12/2002 Document Revised: 03/01/2019 Document Reviewed: 01/29/2019  Elsevier Patient Education © 2020 [employer]

## 2021-08-06 NOTE — PROGRESS NOTES
Subjective:      Alondra Huerta is a 41 y.o. female who presents with Headache (ear pain, congestion, sinus pressure, x2 days. )            Sinus Problem  This is a recurrent problem. The current episode started 1 to 4 weeks ago. The problem has been gradually worsening (2 days) since onset. There has been no fever. The pain is moderate. Associated symptoms include congestion, coughing, ear pain, headaches and sinus pressure. Pertinent negatives include no chills, shortness of breath, sneezing, sore throat or swollen glands. Treatments tried: antihistamine. The treatment provided mild relief.   PMH AR, TMJ    Review of Systems   Constitutional: Negative for chills, fever and malaise/fatigue.   HENT: Positive for congestion, ear pain, nosebleeds, sinus pressure and sinus pain. Negative for ear discharge, hearing loss, sneezing, sore throat and tinnitus.    Respiratory: Positive for cough. Negative for sputum production and shortness of breath.    Gastrointestinal: Negative for diarrhea, nausea and vomiting.   Musculoskeletal: Negative for myalgias.   Skin: Negative for rash.   Neurological: Positive for headaches.   Endo/Heme/Allergies: Positive for environmental allergies.     Past Medical History:   Diagnosis Date   • Anxiety disorder    • Anxiety disorder 3/11/2019   • Hot flashes 6/2/2020   • Migraine      Past Surgical History:   Procedure Laterality Date   • ANKLE ARTHROSCOPY Left 5/15/2017    Procedure: ANKLE ARTHROSCOPY;  Surgeon: Vaibhav Casper M.D.;  Location: [facility];  Service:    • ANKLE LIGAMENT RECONSTRUCTION  5/15/2017    Procedure: ANKLE LIGAMENT RECONSTRUCTION-LATERAL;  Surgeon: Vaibhav Casper M.D.;  Location: [facility];  Service:    • VAGINAL HYSTERECTOMY SCOPE TOTAL  5/22/2009    Performed by SHELLY GUNDERSON at SURGERY SAME DAY [facility] ORS   • SALPINGECTOMY  5/22/2009    Performed by SHELLY GUNDERSON at SURGERY SAME DAY [facility] ORS   • ANKLE ARTHROSCOPY Left  "   • GYN SURGERY  1999,2005    C Section x2    • PRIMARY C SECTION      twice       Allergy:  Iodine and Shellfish allergy     Current Outpatient Medications:   •  guaiFENesin ER, 600 mg, Oral, Q12HRS, Taking  •  topiramate, 25 mg, Oral, BID, Taking  •  albuterol, 2 Puff, Inhalation, Q4HRS PRN, PRN  •  Acetaminophen (TYLENOL PO), Take  by mouth., PRN  •  rizatriptan, 5 mg, Oral, Once PRN, PRN  •  loratadine, 10 mg, Oral, DAILY, PRN  •  asa/apap/caffeine, 1 tablet, Oral, PRN, Taking  •  amitriptyline, 10 mg, Oral, HS PRN  •  amitriptyline, TAKE ONE TABLET BY MOUTH EVERY NIGHT AT BEDTIME   family history includes Diabetes in her father; Heart Disease (age of onset: 50) in her father; Hypertension in her father; No Known Problems in her mother; Other in her sister.   Social History     Tobacco Use   • Smoking status: Current Every Day Smoker     Years: 20.00     Types: Cigarettes   • Smokeless tobacco: Never Used   • Tobacco comment: 1-2 cigarettes a day a day   Vaping Use   • Vaping Use: Never used   Substance Use Topics   • Alcohol use: Yes     Comment: occasionally   • Drug use: No         Objective:     /80   Pulse 81   Temp 36.1 °C (97 °F) (Temporal)   Resp 18   Ht 1.702 m (5' 7\")   Wt 87.5 kg (193 lb)   SpO2 98%   BMI 30.23 kg/m²      Physical Exam  Constitutional:       General: She is not in acute distress.     Appearance: She is well-developed. She is not ill-appearing.   HENT:      Head:      Jaw: Tenderness present. No trismus.      Salivary Glands: Right salivary gland is not diffusely enlarged or tender.        Right Ear: Tympanic membrane, ear canal and external ear normal.      Left Ear: Tympanic membrane, ear canal and external ear normal.      Nose: Mucosal edema present. No rhinorrhea.      Mouth/Throat:      Pharynx: Uvula midline.   Eyes:      Conjunctiva/sclera: Conjunctivae normal.   Neck:      Trachea: Trachea normal.   Cardiovascular:      Rate and Rhythm: Normal rate and regular " rhythm.      Heart sounds: Normal heart sounds.   Pulmonary:      Effort: Pulmonary effort is normal.      Breath sounds: No decreased breath sounds, wheezing, rhonchi or rales.   Musculoskeletal:      Cervical back: Neck supple.   Lymphadenopathy:      Head:      Right side of head: No preauricular or posterior auricular adenopathy.      Cervical: No cervical adenopathy.   Skin:     General: Skin is warm and dry.   Neurological:      Mental Status: She is alert.   Psychiatric:         Behavior: Behavior is cooperative.                        Assessment/Plan:        1. Allergic rhinitis, unspecified seasonality, unspecified trigger  Recommended nasal saline irrigation daily as needed, OTC inhaled nasal steroid daily, OTC nonsedating antihistamine when necessary as tolerated.    2. TMJ (temporomandibular joint syndrome)  Heat, OTC NSAID as needed.  Avoid excess chewing, continue using night mouth splint

## 2021-11-02 ENCOUNTER — TELEMEDICINE (OUTPATIENT)
Dept: MEDICAL GROUP | Facility: MEDICAL CENTER | Age: 41
End: 2021-11-02
Payer: COMMERCIAL

## 2021-11-02 VITALS — HEIGHT: 67 IN | WEIGHT: 180 LBS | BODY MASS INDEX: 28.25 KG/M2

## 2021-11-02 DIAGNOSIS — G43.109 MIGRAINE WITH AURA AND WITHOUT STATUS MIGRAINOSUS, NOT INTRACTABLE: ICD-10-CM

## 2021-11-02 DIAGNOSIS — J30.2 SEASONAL ALLERGIES: ICD-10-CM

## 2021-11-02 DIAGNOSIS — E78.00 HYPERCHOLESTEROLEMIA: ICD-10-CM

## 2021-11-02 DIAGNOSIS — Z72.0 TOBACCO ABUSE: ICD-10-CM

## 2021-11-02 DIAGNOSIS — Z12.31 ENCOUNTER FOR SCREENING MAMMOGRAM FOR MALIGNANT NEOPLASM OF BREAST: ICD-10-CM

## 2021-11-02 PROCEDURE — 99214 OFFICE O/P EST MOD 30 MIN: CPT | Mod: 95 | Performed by: FAMILY MEDICINE

## 2021-11-02 RX ORDER — BUTALBITAL, ACETAMINOPHEN AND CAFFEINE 50; 325; 40 MG/1; MG/1; MG/1
1 TABLET ORAL EVERY 6 HOURS PRN
Qty: 10 TABLET | Refills: 0 | Status: SHIPPED | OUTPATIENT
Start: 2021-11-02 | End: 2021-12-02

## 2021-11-02 RX ORDER — TOPIRAMATE 50 MG/1
50 TABLET, FILM COATED ORAL 2 TIMES DAILY
Qty: 180 TABLET | Refills: 3 | Status: SHIPPED | OUTPATIENT
Start: 2021-11-02 | End: 2022-12-19 | Stop reason: SDUPTHER

## 2021-11-02 RX ORDER — FLUTICASONE PROPIONATE 50 MCG
1 SPRAY, SUSPENSION (ML) NASAL DAILY
COMMUNITY

## 2021-11-02 ASSESSMENT — FIBROSIS 4 INDEX: FIB4 SCORE: 0.76

## 2021-11-02 NOTE — ASSESSMENT & PLAN NOTE
This is a chronic problem, Alondra started smoking in her teens and has smoked about 1/2ppd-1ppd. She would like to work on cessation independently; defers medication or nicotine replacement.

## 2021-11-02 NOTE — PROGRESS NOTES
Telemedicine Visit: Established Patient     This evaluation was conducted via Zoom using secure and encrypted videoconferencing technology. The patient was in a private location in the state of [place].    The patient's identity was confirmed and verbal consent was obtained for this virtual visit.    Subjective:   CC: migraines  Alondra Huerta is a 41 y.o. female presenting for evaluation and management of:    Migraine with aura and without status migrainosus, not intractable  Alondra reports stress-induced migraines (her father unexpectedly  in September, her mother contracted COVID and experienced severe illness). She is having one migraine weekly. She reports nausea and vomiting.     She is using maxalt 5mg as abortive therapy but reports the medication makes her too drowsy to work. She reports Fioricet works well as abortive therapy and does not make her drowsy.    She is currently taking topamax 25mg BID.      Tobacco abuse  This is a chronic problem, Alondra started smoking in her teens and has smoked about 1/2ppd-1ppd. She would like to work on cessation independently; defers medication or nicotine replacement.    Seasonal allergies  Alondra is taking daily oral antihistamine and trying to use flonase however she reports the flonase just drips our of her left nostril. She continues to suffer from persistent nasal congestion.      ROS:   Denies any recent fevers or chills. No nausea or vomiting. No chest pains or shortness of breath.     Allergies   Allergen Reactions   • Iodine Anaphylaxis and Shortness of Breath     IV contrast    • Shellfish Allergy Anaphylaxis       Current medicines (including changes today)  Current Outpatient Medications   Medication Sig Dispense Refill   • fluticasone (FLONASE) 50 MCG/ACT nasal spray Administer 1 Spray into affected nostril(S) every day.     • topiramate (TOPAMAX) 50 MG tablet Take 1 Tablet by mouth 2 times a day. 180 Tablet 3   • butalbital/apap/caffeine -40 mg  "(FIORICET) -40 MG Tab Take 1 Tablet by mouth every 6 hours as needed for Migraine for up to 30 days. 10 Tablet 0   • guaiFENesin ER (MUCINEX) 600 MG TABLET SR 12 HR Take 600 mg by mouth every 12 hours.     • albuterol 108 (90 Base) MCG/ACT Aero Soln inhalation aerosol Inhale 2 Puffs by mouth every four hours as needed for Shortness of Breath. 1 Each 6   • Acetaminophen (TYLENOL PO) Take  by mouth.     • rizatriptan (MAXALT) 5 MG tablet Take 1 Tab by mouth Once PRN for Migraine for up to 1 dose. 10 Tab 0   • loratadine (CLARITIN) 10 MG Tab Take 10 mg by mouth every day.     • asa/apap/caffeine (EXCEDRIN) 250-250-65 MG Tab Take 1 Tab by mouth as needed for Headache.       No current facility-administered medications for this visit.       Patient Active Problem List    Diagnosis Date Noted   • Seasonal allergies 11/02/2021   • Hypercholesterolemia 04/15/2019   • Migraine with aura and without status migrainosus, not intractable 03/11/2019   • Tobacco abuse 03/11/2019       Family History   Problem Relation Age of Onset   • No Known Problems Mother    • Diabetes Father    • Heart Disease Father 50        Heart attack    • Hypertension Father    • Other Sister         migraine       She  has a past medical history of Anxiety disorder, Anxiety disorder (3/11/2019), Hot flashes (6/2/2020), and Migraine.  She  has a past surgical history that includes gyn surgery (1999,2005); ankle arthroscopy (Left, 5/15/2017); ankle ligament reconstruction (5/15/2017); vaginal hysterectomy scope total (5/22/2009); salpingectomy (5/22/2009); primary c section; and ankle arthroscopy (Left).       Objective:   Ht 1.702 m (5' 7\") Comment: pt. reported  Wt 81.6 kg (180 lb) Comment: pt. reported  BMI 28.19 kg/m²     Physical Exam:  Constitutional: Alert, no distress, well-groomed.  Skin: No rashes in visible areas.  Eye: Round. Conjunctiva clear, lids normal. No icterus.   ENMT: Lips pink without lesions, good dentition, moist mucous " membranes. Phonation normal.  Respiratory: Unlabored respiratory effort, no cough or audible wheeze  Psych: Alert and oriented x3, normal affect and mood.       Assessment and Plan:   The following treatment plan was discussed:     1. Migraine with aura and without status migrainosus, not intractable  Increased migraine frequency due to stress. Recommended patient increase Topamax to 50 mg twice daily with goal of improved prophylaxis (instructed to increase dosing by 25mg/day each week).  Continue abortive therapy with rizatriptan.  Rizatriptan is effective however makes patient drowsy, limited supply of Fioricet prescribed for days patient has a migraine and must work.  - butalbital/apap/caffeine -40 mg (FIORICET) -40 MG Tab; Take 1 Tablet by mouth every 6 hours as needed for Migraine for up to 30 days.  Dispense: 10 Tablet; Refill: 0    2. Hypercholesterolemia  - Comp Metabolic Panel; Future  - Lipid Profile; Future  - HEMOGLOBIN A1C; Future    3. Tobacco abuse  Counseled on cessation, patient is actively working on cutting down.  - CBC WITH DIFFERENTIAL; Future    4. Encounter for screening mammogram for malignant neoplasm of breast  - MA-SCREENING MAMMO BILAT W/TOMOSYNTHESIS W/CAD; Future    5. Seasonal allergies  Continue oral antihistamine; as congestion is currently inhibiting flonase administration, recommended no more than 3 days use of Afrin, followed by flonase. Reiterated importance of limiting afrin to 3 days.    Other orders  - fluticasone (FLONASE) 50 MCG/ACT nasal spray; Administer 1 Spray into affected nostril(S) every day.  - topiramate (TOPAMAX) 50 MG tablet; Take 1 Tablet by mouth 2 times a day.  Dispense: 180 Tablet; Refill: 3        Follow-up: Return in about 1 month (around 12/2/2021).

## 2021-11-02 NOTE — ASSESSMENT & PLAN NOTE
Alondra reports stress-induced migraines (her father unexpectedly  in September, her mother contracted COVID and experienced severe illness). She is having one migraine weekly. She reports nausea and vomiting.     She is using maxalt 5mg as abortive therapy but reports the medication makes her too drowsy to work. She reports Fioricet works well as abortive therapy and does not make her drowsy.    She is currently taking topamax 25mg BID.

## 2021-11-02 NOTE — ASSESSMENT & PLAN NOTE
Alondra is taking daily oral antihistamine and trying to use flonase however she reports the flonase just drips our of her left nostril. She continues to suffer from persistent nasal congestion.

## 2021-11-16 ENCOUNTER — PATIENT MESSAGE (OUTPATIENT)
Dept: MEDICAL GROUP | Facility: MEDICAL CENTER | Age: 41
End: 2021-11-16

## 2021-11-16 ENCOUNTER — HOSPITAL ENCOUNTER (OUTPATIENT)
Dept: LAB | Facility: MEDICAL CENTER | Age: 41
End: 2021-11-16
Attending: FAMILY MEDICINE
Payer: COMMERCIAL

## 2021-11-16 DIAGNOSIS — E78.00 HYPERCHOLESTEROLEMIA: ICD-10-CM

## 2021-11-16 DIAGNOSIS — Z72.0 TOBACCO ABUSE: ICD-10-CM

## 2021-11-16 LAB
ALBUMIN SERPL BCP-MCNC: 4.6 G/DL (ref 3.2–4.9)
ALBUMIN/GLOB SERPL: 1.6 G/DL
ALP SERPL-CCNC: 113 U/L (ref 30–99)
ALT SERPL-CCNC: 18 U/L (ref 2–50)
ANION GAP SERPL CALC-SCNC: 10 MMOL/L (ref 7–16)
AST SERPL-CCNC: 16 U/L (ref 12–45)
BASOPHILS # BLD AUTO: 0.8 % (ref 0–1.8)
BASOPHILS # BLD: 0.11 K/UL (ref 0–0.12)
BILIRUB SERPL-MCNC: 0.2 MG/DL (ref 0.1–1.5)
BUN SERPL-MCNC: 21 MG/DL (ref 8–22)
CALCIUM SERPL-MCNC: 9.3 MG/DL (ref 8.5–10.5)
CHLORIDE SERPL-SCNC: 110 MMOL/L (ref 96–112)
CHOLEST SERPL-MCNC: 200 MG/DL (ref 100–199)
CO2 SERPL-SCNC: 21 MMOL/L (ref 20–33)
CREAT SERPL-MCNC: 0.87 MG/DL (ref 0.5–1.4)
EOSINOPHIL # BLD AUTO: 0.47 K/UL (ref 0–0.51)
EOSINOPHIL NFR BLD: 3.4 % (ref 0–6.9)
ERYTHROCYTE [DISTWIDTH] IN BLOOD BY AUTOMATED COUNT: 44.5 FL (ref 35.9–50)
EST. AVERAGE GLUCOSE BLD GHB EST-MCNC: 108 MG/DL
GLOBULIN SER CALC-MCNC: 2.8 G/DL (ref 1.9–3.5)
GLUCOSE SERPL-MCNC: 96 MG/DL (ref 65–99)
HBA1C MFR BLD: 5.4 % (ref 4–5.6)
HCT VFR BLD AUTO: 46.7 % (ref 37–47)
HDLC SERPL-MCNC: 51 MG/DL
HGB BLD-MCNC: 14.9 G/DL (ref 12–16)
IMM GRANULOCYTES # BLD AUTO: 0.06 K/UL (ref 0–0.11)
IMM GRANULOCYTES NFR BLD AUTO: 0.4 % (ref 0–0.9)
LDLC SERPL CALC-MCNC: 132 MG/DL
LYMPHOCYTES # BLD AUTO: 4.44 K/UL (ref 1–4.8)
LYMPHOCYTES NFR BLD: 32.3 % (ref 22–41)
MCH RBC QN AUTO: 29.8 PG (ref 27–33)
MCHC RBC AUTO-ENTMCNC: 31.9 G/DL (ref 33.6–35)
MCV RBC AUTO: 93.4 FL (ref 81.4–97.8)
MONOCYTES # BLD AUTO: 0.97 K/UL (ref 0–0.85)
MONOCYTES NFR BLD AUTO: 7.1 % (ref 0–13.4)
NEUTROPHILS # BLD AUTO: 7.7 K/UL (ref 2–7.15)
NEUTROPHILS NFR BLD: 56 % (ref 44–72)
NRBC # BLD AUTO: 0 K/UL
NRBC BLD-RTO: 0 /100 WBC
PLATELET # BLD AUTO: 264 K/UL (ref 164–446)
PMV BLD AUTO: 11.2 FL (ref 9–12.9)
POTASSIUM SERPL-SCNC: 4.4 MMOL/L (ref 3.6–5.5)
PROT SERPL-MCNC: 7.4 G/DL (ref 6–8.2)
RBC # BLD AUTO: 5 M/UL (ref 4.2–5.4)
SODIUM SERPL-SCNC: 141 MMOL/L (ref 135–145)
TRIGL SERPL-MCNC: 84 MG/DL (ref 0–149)
WBC # BLD AUTO: 13.8 K/UL (ref 4.8–10.8)

## 2021-11-16 PROCEDURE — 80061 LIPID PANEL: CPT

## 2021-11-16 PROCEDURE — 83036 HEMOGLOBIN GLYCOSYLATED A1C: CPT

## 2021-11-16 PROCEDURE — 80053 COMPREHEN METABOLIC PANEL: CPT

## 2021-11-16 PROCEDURE — 85025 COMPLETE CBC W/AUTO DIFF WBC: CPT

## 2021-11-16 PROCEDURE — 36415 COLL VENOUS BLD VENIPUNCTURE: CPT

## 2021-11-19 ENCOUNTER — PHARMACY VISIT (OUTPATIENT)
Dept: PHARMACY | Facility: MEDICAL CENTER | Age: 41
End: 2021-11-19
Payer: COMMERCIAL

## 2021-11-19 DIAGNOSIS — D72.829 LEUKOCYTOSIS, UNSPECIFIED TYPE: ICD-10-CM

## 2021-11-19 PROCEDURE — RXMED WILLOW AMBULATORY MEDICATION CHARGE: Performed by: FAMILY MEDICINE

## 2021-11-19 RX ORDER — AMOXICILLIN AND CLAVULANATE POTASSIUM 875; 125 MG/1; MG/1
1 TABLET, FILM COATED ORAL 2 TIMES DAILY
Qty: 10 TABLET | Refills: 0 | Status: SHIPPED | OUTPATIENT
Start: 2021-11-19 | End: 2021-11-24

## 2021-11-24 ENCOUNTER — PHARMACY VISIT (OUTPATIENT)
Dept: PHARMACY | Facility: MEDICAL CENTER | Age: 41
End: 2021-11-24
Payer: COMMERCIAL

## 2021-11-24 ENCOUNTER — PATIENT MESSAGE (OUTPATIENT)
Dept: MEDICAL GROUP | Facility: MEDICAL CENTER | Age: 41
End: 2021-11-24

## 2021-11-24 PROCEDURE — RXMED WILLOW AMBULATORY MEDICATION CHARGE: Performed by: FAMILY MEDICINE

## 2021-11-24 RX ORDER — FLUCONAZOLE 150 MG/1
150 TABLET ORAL ONCE
Qty: 3 TABLET | Refills: 1 | Status: SHIPPED | OUTPATIENT
Start: 2021-11-24 | End: 2021-11-27

## 2021-12-20 ENCOUNTER — HOSPITAL ENCOUNTER (OUTPATIENT)
Dept: RADIOLOGY | Facility: MEDICAL CENTER | Age: 41
End: 2021-12-20
Attending: FAMILY MEDICINE
Payer: COMMERCIAL

## 2021-12-20 DIAGNOSIS — Z12.31 ENCOUNTER FOR SCREENING MAMMOGRAM FOR MALIGNANT NEOPLASM OF BREAST: ICD-10-CM

## 2021-12-20 PROCEDURE — 77063 BREAST TOMOSYNTHESIS BI: CPT

## 2022-03-09 ENCOUNTER — PATIENT MESSAGE (OUTPATIENT)
Dept: MEDICAL GROUP | Facility: MEDICAL CENTER | Age: 42
End: 2022-03-09
Payer: COMMERCIAL

## 2022-03-09 RX ORDER — CETIRIZINE HYDROCHLORIDE, PSEUDOEPHEDRINE HYDROCHLORIDE 5; 120 MG/1; MG/1
1 TABLET, FILM COATED, EXTENDED RELEASE ORAL
Qty: 30 TABLET | Refills: 0 | Status: SHIPPED | OUTPATIENT
Start: 2022-03-09 | End: 2022-12-20

## 2022-05-27 ENCOUNTER — OFFICE VISIT (OUTPATIENT)
Dept: URGENT CARE | Facility: PHYSICIAN GROUP | Age: 42
End: 2022-05-27
Payer: COMMERCIAL

## 2022-05-27 VITALS
OXYGEN SATURATION: 97 % | DIASTOLIC BLOOD PRESSURE: 88 MMHG | SYSTOLIC BLOOD PRESSURE: 122 MMHG | HEIGHT: 67 IN | WEIGHT: 173 LBS | TEMPERATURE: 97.7 F | RESPIRATION RATE: 18 BRPM | HEART RATE: 81 BPM | BODY MASS INDEX: 27.15 KG/M2

## 2022-05-27 DIAGNOSIS — U07.1 COVID-19 VIRUS INFECTION: ICD-10-CM

## 2022-05-27 PROCEDURE — 99214 OFFICE O/P EST MOD 30 MIN: CPT | Performed by: NURSE PRACTITIONER

## 2022-05-27 ASSESSMENT — FIBROSIS 4 INDEX: FIB4 SCORE: 0.63

## 2022-05-27 ASSESSMENT — ENCOUNTER SYMPTOMS
COUGH: 1
NAUSEA: 1
CHILLS: 1
FEVER: 1
SHORTNESS OF BREATH: 1
MYALGIAS: 1
VOMITING: 1
HEADACHES: 1
SORE THROAT: 1

## 2022-05-27 NOTE — PROGRESS NOTES
Subjective:     Alondra Huerta is a 42 y.o. female who presents for Coronavirus Screening (Covid positive 5/26/22. Weakness, cough, cold, sore thraot, ear pain, severe migraine, vomiting. ) and Shortness of Breath (Hard to take a deep breath. )      Shortness of Breath  Associated symptoms include a fever, headaches, a sore throat and vomiting.     Pt presents for evaluation of a new problem. Alondra is a pleasant 42-year-old female presents to urgent care today with COVID-like symptoms.  Her symptoms started yesterday.  She did take a COVID test and did come back positive.  She complains of productive cough, severe headache, sore throat and shortness of breath.  She does suffer from asthma.  Due to her history of asthma and severe COVID symptoms she would like to start Paxlovid.  She has been using over-the-counter treatment for her symptoms as well as her albuterol inhaler for shortness of breath.  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  Review of Systems   Constitutional: Positive for chills, fever and malaise/fatigue.   HENT: Positive for congestion and sore throat.    Respiratory: Positive for cough and shortness of breath.    Gastrointestinal: Positive for nausea and vomiting.   Musculoskeletal: Positive for myalgias.   Neurological: Positive for headaches.       PMH:   Past Medical History:   Diagnosis Date   • Anxiety disorder    • Anxiety disorder 3/11/2019   • Hot flashes 6/2/2020   • Migraine      ALLERGIES:   Allergies   Allergen Reactions   • Iodine Anaphylaxis and Shortness of Breath     IV contrast    • Shellfish Allergy Anaphylaxis     SURGHX:   Past Surgical History:   Procedure Laterality Date   • ANKLE ARTHROSCOPY Left 5/15/2017    Procedure: ANKLE ARTHROSCOPY;  Surgeon: Vaibhav Capser M.D.;  Location: [facility];  Service:    • ANKLE LIGAMENT RECONSTRUCTION  5/15/2017    Procedure: ANKLE LIGAMENT RECONSTRUCTION-LATERAL;  Surgeon: Vaibhav Casper M.D.;  Location: [facility]  "[place] [facility];  Service:    • VAGINAL HYSTERECTOMY SCOPE TOTAL  5/22/2009    Performed by SHELLY GUNDERSON at SURGERY SAME DAY [facility]S   • SALPINGECTOMY  5/22/2009    Performed by SHELLY GUNDERSON at SURGERY SAME DAY [facility]   • ANKLE ARTHROSCOPY Left    • GYN SURGERY  1999,2005    C Section x2    • PRIMARY C SECTION      twice      SOCHX:   Social History     Socioeconomic History   • Marital status:    Tobacco Use   • Smoking status: Current Every Day Smoker     Years: 20.00     Types: Cigarettes   • Smokeless tobacco: Never Used   • Tobacco comment: under a pack a day   Vaping Use   • Vaping Use: Never used   Substance and Sexual Activity   • Alcohol use: Yes     Comment: occasionally   • Drug use: No   • Sexual activity: Yes     Partners: Male     Birth control/protection: Surgical     FH:   Family History   Problem Relation Age of Onset   • No Known Problems Mother    • Diabetes Father    • Heart Disease Father 50        Heart attack    • Hypertension Father    • Other Sister         migraine         Objective:   /88   Pulse 81   Temp 36.5 °C (97.7 °F) (Tympanic)   Resp 18   Ht 1.702 m (5' 7\")   Wt 78.5 kg (173 lb)   SpO2 97%   BMI 27.10 kg/m²     Physical Exam  Vitals and nursing note reviewed.   Constitutional:       General: She is not in acute distress.     Appearance: Normal appearance. She is normal weight. She is ill-appearing.   HENT:      Head: Normocephalic and atraumatic.      Right Ear: Tympanic membrane, ear canal and external ear normal. There is no impacted cerumen.      Left Ear: Tympanic membrane, ear canal and external ear normal. There is no impacted cerumen.      Nose: Congestion present. No rhinorrhea.      Mouth/Throat:      Mouth: Mucous membranes are moist.      Pharynx: Posterior oropharyngeal erythema present. No oropharyngeal exudate.   Eyes:      Extraocular Movements: Extraocular movements intact.      Pupils: Pupils are equal, round, and reactive to " light.   Cardiovascular:      Rate and Rhythm: Normal rate and regular rhythm.      Pulses: Normal pulses.      Heart sounds: Normal heart sounds.   Pulmonary:      Effort: Pulmonary effort is normal. No respiratory distress.      Breath sounds: Normal breath sounds. No stridor. No wheezing, rhonchi or rales.   Chest:      Chest wall: No tenderness.   Abdominal:      General: Abdomen is flat. Bowel sounds are normal.      Palpations: Abdomen is soft.      Tenderness: There is no abdominal tenderness. There is no right CVA tenderness or left CVA tenderness.   Musculoskeletal:         General: Normal range of motion.      Cervical back: Normal range of motion and neck supple. Tenderness present.   Lymphadenopathy:      Cervical: Cervical adenopathy present.   Skin:     General: Skin is warm and dry.      Capillary Refill: Capillary refill takes less than 2 seconds.   Neurological:      General: No focal deficit present.      Mental Status: She is alert and oriented to person, place, and time. Mental status is at baseline.   Psychiatric:         Mood and Affect: Mood normal.         Behavior: Behavior normal.         Thought Content: Thought content normal.         Judgment: Judgment normal.         Assessment/Plan:   Assessment    1. COVID-19 virus infection  Nirmatrelvir & Ritonavir 20 x 150 MG & 10 x 100MG Tablet Therapy Pack   She was prescribed treatment for COVID infection.  Risks versus benefits discussed.  She does meet criteria as she does suffer from respiratory reactive airway disease.   We discussed supportive measures including humidifier, warm salt water gargles, over-the-counter Cepacol throat lozenges, rest  and increased fluids. Pt was encouraged to seek treatment back in the ER or urgent care for worsening symptoms,  fever greater than 100.5, wheezes or shortness of breath.

## 2022-09-29 ENCOUNTER — IMMUNIZATION (OUTPATIENT)
Dept: OCCUPATIONAL MEDICINE | Facility: CLINIC | Age: 42
End: 2022-09-29

## 2022-09-29 DIAGNOSIS — Z23 NEED FOR VACCINATION: Primary | ICD-10-CM

## 2022-09-29 PROCEDURE — 90686 IIV4 VACC NO PRSV 0.5 ML IM: CPT | Performed by: NURSE PRACTITIONER

## 2022-12-19 ENCOUNTER — OFFICE VISIT (OUTPATIENT)
Dept: MEDICAL GROUP | Facility: PHYSICIAN GROUP | Age: 42
End: 2022-12-19
Payer: COMMERCIAL

## 2022-12-19 VITALS
TEMPERATURE: 97.7 F | DIASTOLIC BLOOD PRESSURE: 80 MMHG | OXYGEN SATURATION: 99 % | HEIGHT: 67 IN | WEIGHT: 173 LBS | SYSTOLIC BLOOD PRESSURE: 118 MMHG | BODY MASS INDEX: 27.15 KG/M2 | HEART RATE: 71 BPM

## 2022-12-19 DIAGNOSIS — J30.2 SEASONAL ALLERGIES: ICD-10-CM

## 2022-12-19 DIAGNOSIS — Z23 NEED FOR VACCINATION: ICD-10-CM

## 2022-12-19 DIAGNOSIS — Z11.59 NEED FOR HEPATITIS C SCREENING TEST: ICD-10-CM

## 2022-12-19 DIAGNOSIS — Z76.89 ENCOUNTER TO ESTABLISH CARE: ICD-10-CM

## 2022-12-19 DIAGNOSIS — E78.00 HYPERCHOLESTEROLEMIA: ICD-10-CM

## 2022-12-19 DIAGNOSIS — Z00.00 PREVENTATIVE HEALTH CARE: ICD-10-CM

## 2022-12-19 DIAGNOSIS — Z72.0 TOBACCO ABUSE: ICD-10-CM

## 2022-12-19 DIAGNOSIS — G43.109 MIGRAINE WITH AURA AND WITHOUT STATUS MIGRAINOSUS, NOT INTRACTABLE: ICD-10-CM

## 2022-12-19 PROCEDURE — 90471 IMMUNIZATION ADMIN: CPT | Performed by: NURSE PRACTITIONER

## 2022-12-19 PROCEDURE — 90677 PCV20 VACCINE IM: CPT | Performed by: NURSE PRACTITIONER

## 2022-12-19 PROCEDURE — RXMED WILLOW AMBULATORY MEDICATION CHARGE: Performed by: NURSE PRACTITIONER

## 2022-12-19 PROCEDURE — 99214 OFFICE O/P EST MOD 30 MIN: CPT | Mod: 25 | Performed by: NURSE PRACTITIONER

## 2022-12-19 RX ORDER — BUTALBITAL, ACETAMINOPHEN AND CAFFEINE 50; 325; 40 MG/1; MG/1; MG/1
1 TABLET ORAL EVERY 6 HOURS PRN
Qty: 10 TABLET | Refills: 0 | Status: SHIPPED | OUTPATIENT
Start: 2022-12-19 | End: 2023-07-20 | Stop reason: SDUPTHER

## 2022-12-19 RX ORDER — TOPIRAMATE 50 MG/1
50 TABLET, FILM COATED ORAL 2 TIMES DAILY
Qty: 180 TABLET | Refills: 3 | Status: SHIPPED | OUTPATIENT
Start: 2022-12-19 | End: 2023-03-20

## 2022-12-19 RX ORDER — BUTALBITAL, ACETAMINOPHEN AND CAFFEINE 300; 40; 50 MG/1; MG/1; MG/1
CAPSULE ORAL
COMMUNITY
End: 2022-12-19

## 2022-12-19 SDOH — ECONOMIC STABILITY: TRANSPORTATION INSECURITY
IN THE PAST 12 MONTHS, HAS THE LACK OF TRANSPORTATION KEPT YOU FROM MEDICAL APPOINTMENTS OR FROM GETTING MEDICATIONS?: NO

## 2022-12-19 SDOH — ECONOMIC STABILITY: HOUSING INSECURITY
IN THE LAST 12 MONTHS, WAS THERE A TIME WHEN YOU DID NOT HAVE A STEADY PLACE TO SLEEP OR SLEPT IN A SHELTER (INCLUDING NOW)?: NO

## 2022-12-19 SDOH — ECONOMIC STABILITY: INCOME INSECURITY: IN THE LAST 12 MONTHS, WAS THERE A TIME WHEN YOU WERE NOT ABLE TO PAY THE MORTGAGE OR RENT ON TIME?: NO

## 2022-12-19 SDOH — HEALTH STABILITY: PHYSICAL HEALTH: ON AVERAGE, HOW MANY MINUTES DO YOU ENGAGE IN EXERCISE AT THIS LEVEL?: 30 MIN

## 2022-12-19 SDOH — ECONOMIC STABILITY: HOUSING INSECURITY: IN THE LAST 12 MONTHS, HOW MANY PLACES HAVE YOU LIVED?: 1

## 2022-12-19 SDOH — ECONOMIC STABILITY: FOOD INSECURITY: WITHIN THE PAST 12 MONTHS, THE FOOD YOU BOUGHT JUST DIDN'T LAST AND YOU DIDN'T HAVE MONEY TO GET MORE.: NEVER TRUE

## 2022-12-19 SDOH — HEALTH STABILITY: PHYSICAL HEALTH: ON AVERAGE, HOW MANY DAYS PER WEEK DO YOU ENGAGE IN MODERATE TO STRENUOUS EXERCISE (LIKE A BRISK WALK)?: 3 DAYS

## 2022-12-19 SDOH — ECONOMIC STABILITY: FOOD INSECURITY: WITHIN THE PAST 12 MONTHS, YOU WORRIED THAT YOUR FOOD WOULD RUN OUT BEFORE YOU GOT MONEY TO BUY MORE.: NEVER TRUE

## 2022-12-19 SDOH — ECONOMIC STABILITY: TRANSPORTATION INSECURITY
IN THE PAST 12 MONTHS, HAS LACK OF RELIABLE TRANSPORTATION KEPT YOU FROM MEDICAL APPOINTMENTS, MEETINGS, WORK OR FROM GETTING THINGS NEEDED FOR DAILY LIVING?: NO

## 2022-12-19 SDOH — ECONOMIC STABILITY: TRANSPORTATION INSECURITY
IN THE PAST 12 MONTHS, HAS LACK OF TRANSPORTATION KEPT YOU FROM MEETINGS, WORK, OR FROM GETTING THINGS NEEDED FOR DAILY LIVING?: NO

## 2022-12-19 SDOH — HEALTH STABILITY: MENTAL HEALTH
STRESS IS WHEN SOMEONE FEELS TENSE, NERVOUS, ANXIOUS, OR CAN'T SLEEP AT NIGHT BECAUSE THEIR MIND IS TROUBLED. HOW STRESSED ARE YOU?: ONLY A LITTLE

## 2022-12-19 SDOH — ECONOMIC STABILITY: INCOME INSECURITY: HOW HARD IS IT FOR YOU TO PAY FOR THE VERY BASICS LIKE FOOD, HOUSING, MEDICAL CARE, AND HEATING?: NOT HARD AT ALL

## 2022-12-19 ASSESSMENT — SOCIAL DETERMINANTS OF HEALTH (SDOH)
HOW HARD IS IT FOR YOU TO PAY FOR THE VERY BASICS LIKE FOOD, HOUSING, MEDICAL CARE, AND HEATING?: NOT HARD AT ALL
DO YOU BELONG TO ANY CLUBS OR ORGANIZATIONS SUCH AS CHURCH GROUPS UNIONS, FRATERNAL OR ATHLETIC GROUPS, OR SCHOOL GROUPS?: NO
DO YOU BELONG TO ANY CLUBS OR ORGANIZATIONS SUCH AS CHURCH GROUPS UNIONS, FRATERNAL OR ATHLETIC GROUPS, OR SCHOOL GROUPS?: NO
HOW OFTEN DO YOU GET TOGETHER WITH FRIENDS OR RELATIVES?: ONCE A WEEK
WITHIN THE PAST 12 MONTHS, YOU WORRIED THAT YOUR FOOD WOULD RUN OUT BEFORE YOU GOT THE MONEY TO BUY MORE: NEVER TRUE
IN A TYPICAL WEEK, HOW MANY TIMES DO YOU TALK ON THE PHONE WITH FAMILY, FRIENDS, OR NEIGHBORS?: THREE TIMES A WEEK
HOW OFTEN DO YOU ATTENT MEETINGS OF THE CLUB OR ORGANIZATION YOU BELONG TO?: NEVER
HOW OFTEN DO YOU ATTEND CHURCH OR RELIGIOUS SERVICES?: NEVER
HOW OFTEN DO YOU HAVE SIX OR MORE DRINKS ON ONE OCCASION: LESS THAN MONTHLY
IN A TYPICAL WEEK, HOW MANY TIMES DO YOU TALK ON THE PHONE WITH FAMILY, FRIENDS, OR NEIGHBORS?: THREE TIMES A WEEK
HOW MANY DRINKS CONTAINING ALCOHOL DO YOU HAVE ON A TYPICAL DAY WHEN YOU ARE DRINKING: 1 OR 2
HOW OFTEN DO YOU GET TOGETHER WITH FRIENDS OR RELATIVES?: ONCE A WEEK
HOW OFTEN DO YOU ATTENT MEETINGS OF THE CLUB OR ORGANIZATION YOU BELONG TO?: NEVER
HOW OFTEN DO YOU HAVE A DRINK CONTAINING ALCOHOL: MONTHLY OR LESS
HOW OFTEN DO YOU ATTEND CHURCH OR RELIGIOUS SERVICES?: NEVER

## 2022-12-19 ASSESSMENT — LIFESTYLE VARIABLES
HOW OFTEN DO YOU HAVE SIX OR MORE DRINKS ON ONE OCCASION: LESS THAN MONTHLY
SKIP TO QUESTIONS 9-10: 0
HOW MANY STANDARD DRINKS CONTAINING ALCOHOL DO YOU HAVE ON A TYPICAL DAY: 1 OR 2
AUDIT-C TOTAL SCORE: 2
HOW OFTEN DO YOU HAVE A DRINK CONTAINING ALCOHOL: MONTHLY OR LESS

## 2022-12-19 ASSESSMENT — FIBROSIS 4 INDEX: FIB4 SCORE: 0.63

## 2022-12-19 ASSESSMENT — PATIENT HEALTH QUESTIONNAIRE - PHQ9: CLINICAL INTERPRETATION OF PHQ2 SCORE: 0

## 2022-12-19 NOTE — PROGRESS NOTES
"Subjective:     CC:  Diagnoses of Encounter to establish care, Migraine with aura and without status migrainosus, not intractable, Hypercholesterolemia, Tobacco abuse, Seasonal allergies, Preventative health care, Need for hepatitis C screening test, and Need for vaccination were pertinent to this visit.    HISTORY OF THE PRESENT ILLNESS: Patient is a 42 y.o. female. This pleasant patient is here today to establish care and discuss the following. Her prior PCP was Dr. Yudy Urrutia.    Migraine with aura and without status migrainosus, not intractable  She is taking Topamax 50 mg twice daily, rizatriptan 5 mg as needed (last use once in the last 1-2 months).  When she feels migraine coming on she will take Excedrin. She is taking Fioricet as needed. She states since the dose increase on Topamax she is at 2-3 migraines a month. Without Topamax she was getting \"a lot of migraines\". Stress and anxiety can flare her migraines. She is using Mobie talk therapy. She has been out of Fioricet after last PCP did not want to prescribe.  She is requesting refill on Fioricet today.    Hypercholesterolemia  She does not take any medication. Due for updated labs. She notes 20 pound weight loss through diet and exercise.     Tobacco abuse  Smoking 1/2 pack a day for 20 years. She is working on decreasing her use and making healthier choices.     Seasonal allergies  She is taking Chlor tab 4 mg daily and fluticasone nasal spray.  Allergies have been flaring.  Has appointment scheduled for tomorrow.    Allergies: Iodine and Shellfish allergy    Current Outpatient Medications Ordered in Epic   Medication Sig Dispense Refill    topiramate (TOPAMAX) 50 MG tablet Take 1 Tablet by mouth 2 times a day. 180 Tablet 3    butalbital/apap/caffeine (FIORICET) -40 mg Tab Take 1 Tablet by mouth every 6 hours as needed for Headache for up to 30 days. 10 Tablet 0    fluticasone (FLONASE) 50 MCG/ACT nasal spray Administer 1 Spray into " affected nostril(S) every day.      guaiFENesin ER (MUCINEX) 600 MG TABLET SR 12 HR Take 600 mg by mouth every 12 hours.      albuterol 108 (90 Base) MCG/ACT Aero Soln inhalation aerosol Inhale 2 Puffs by mouth every four hours as needed for Shortness of Breath. 1 Each 6    rizatriptan (MAXALT) 5 MG tablet Take 1 Tab by mouth Once PRN for Migraine for up to 1 dose. 10 Tab 0    asa/apap/caffeine (EXCEDRIN) 250-250-65 MG Tab Take 1 Tab by mouth as needed for Headache.      amoxicillin-clavulanate (AUGMENTIN) 875-125 MG Tab Take 1 Tablet by mouth 2 times a day for 5 days. 10 Tablet 0    fluconazole (DIFLUCAN) 150 MG tablet Take 1 Tablet by mouth every day for 1 dose. 1 Tablet 0    montelukast (SINGULAIR) 10 MG Tab Take 1 Tablet by mouth every day. 90 Tablet 1     No current Epic-ordered facility-administered medications on file.       Past Medical History:   Diagnosis Date    Anxiety disorder     Anxiety disorder 3/11/2019    Hot flashes 6/2/2020    Migraine        Past Surgical History:   Procedure Laterality Date    ANKLE ARTHROSCOPY Left 5/15/2017    Procedure: ANKLE ARTHROSCOPY;  Surgeon: Vaibhav Casper M.D.;  Location: SURGERY [facility];  Service:     ANKLE LIGAMENT RECONSTRUCTION  5/15/2017    Procedure: ANKLE LIGAMENT RECONSTRUCTION-LATERAL;  Surgeon: Vaibhav Casper M.D.;  Location: SURGERY [facility];  Service:     VAGINAL HYSTERECTOMY SCOPE TOTAL  5/22/2009    Performed by SHELLY GUNDERSON at SURGERY SAME DAY [facility]    SALPINGECTOMY  5/22/2009    Performed by SHELLY GUNDERSON at SURGERY SAME DAY [facility]    ANKLE ARTHROSCOPY Left     GYN SURGERY  1999,2005    C Section x2     PRIMARY C SECTION      twice        Social History     Tobacco Use    Smoking status: Every Day     Packs/day: 0.50     Years: 20.00     Pack years: 10.00     Types: Cigarettes    Smokeless tobacco: Never    Tobacco comments:     under a pack a day   Vaping Use    Vaping Use: Never used   Substance Use Topics     "Alcohol use: Yes     Comment: rarely    Drug use: No       Social History     Social History Narrative    Not on file       Family History   Problem Relation Age of Onset    No Known Problems Mother     Diabetes Father     Heart Disease Father 50        Heart attack     Hypertension Father     Other Sister         migraine    No Known Problems Sister     No Known Problems Sister     Sleep Apnea Daughter     No Known Problems Son        Health Maintenance: Reviewed.  She is interested in her pneumonia vaccine today.        Objective:     Vital signs reviewed  Exam: /80 (BP Location: Left arm, Patient Position: Sitting, BP Cuff Size: Adult)   Pulse 71   Temp 36.5 °C (97.7 °F) (Temporal)   Ht 1.702 m (5' 7\")   Wt 78.5 kg (173 lb)   SpO2 99%  Body mass index is 27.1 kg/m².    Gen: Alert and oriented, No apparent distress.  Eyes:   Lids normal. Glasses in place.   Lungs: Normal effort, CTA bilaterally, no wheezes, rhonchi, or rales.    CV: Regular rate and rhythm. No murmurs, rubs, or gallops.  Ext: No clubbing, cyanosis, edema      Assessment & Plan:   42 y.o. female with the following -    1. Encounter to establish care  Acute uncomplicated problem.  Care established today.    2. Migraine with aura and without status migrainosus, not intractable  Chronic stable problem.  Continue with Topamax 50 mg twice daily.  Medication refilled today.  Continue with over-the-counter Excedrin as needed.  Refill today on her Fioricet.   reviewed today.  Medication electronically refilled today.  Controlled substance treatment agreement updated and UDS collected in office today.  - Controlled Substance Treatment Agreement  - topiramate (TOPAMAX) 50 MG tablet; Take 1 Tablet by mouth 2 times a day.  Dispense: 180 Tablet; Refill: 3  - butalbital/apap/caffeine (FIORICET) -40 mg Tab; Take 1 Tablet by mouth every 6 hours as needed for Headache for up to 30 days.  Dispense: 10 Tablet; Refill: 0  - PAIN MANAGEMENT SCRN, " UR; Future    3. Hypercholesterolemia  Chronic stable problem.  Continue healthy diet and exercise.  Checking updated labs.    4. Tobacco abuse  Chronic stable problem.  Continue with smoking cessation and using lowest amount possible.    5. Seasonal allergies  Chronic stable problem.  Continue with Chlortab and fluticasone. Keep appointment for tomorrow.     6. Preventative health care  Chronic stable problem.  Due for annual labs.  - Comp Metabolic Panel; Future  - CBC WITH DIFFERENTIAL; Future  - Lipid Profile; Future    7. Need for hepatitis C screening test  Acute uncomplicated problem.  Discussed hepatitis C screening and she is in agreement.  Order placed today.  - HCV Scrn ( [id number] [employer]); Future    8. Need for vaccination  Acute uncomplicated problem.  She is interested in her pneumonia vaccine today. I have placed the below orders and discussed them with an approved delegating provider. The MA is performing the below orders under the direction of Dr. Michelle.  - Pneumococcal Conjugate Vaccine 20-Valent (19 yrs+)        Return in about 1 day (around 2022) for allergies.    Please note that this dictation was created using voice recognition software. I have made every reasonable attempt to correct obvious errors, but I expect that there are errors of grammar and possibly content that I did not discover before finalizing the note.

## 2022-12-20 ENCOUNTER — PHARMACY VISIT (OUTPATIENT)
Dept: PHARMACY | Facility: MEDICAL CENTER | Age: 42
End: 2022-12-20
Payer: COMMERCIAL

## 2022-12-20 ENCOUNTER — OFFICE VISIT (OUTPATIENT)
Dept: MEDICAL GROUP | Facility: PHYSICIAN GROUP | Age: 42
End: 2022-12-20
Payer: COMMERCIAL

## 2022-12-20 VITALS
OXYGEN SATURATION: 99 % | TEMPERATURE: 97.6 F | BODY MASS INDEX: 27 KG/M2 | DIASTOLIC BLOOD PRESSURE: 76 MMHG | SYSTOLIC BLOOD PRESSURE: 122 MMHG | HEIGHT: 67 IN | HEART RATE: 82 BPM | WEIGHT: 172 LBS

## 2022-12-20 DIAGNOSIS — J30.2 SEASONAL ALLERGIES: ICD-10-CM

## 2022-12-20 DIAGNOSIS — J01.40 ACUTE NON-RECURRENT PANSINUSITIS: ICD-10-CM

## 2022-12-20 DIAGNOSIS — B37.9 CANDIDA INFECTION: ICD-10-CM

## 2022-12-20 PROCEDURE — 99214 OFFICE O/P EST MOD 30 MIN: CPT | Performed by: NURSE PRACTITIONER

## 2022-12-20 PROCEDURE — RXMED WILLOW AMBULATORY MEDICATION CHARGE: Performed by: NURSE PRACTITIONER

## 2022-12-20 RX ORDER — CHLORPHENIRAMINE MALEATE 4 MG/1
4 TABLET ORAL DAILY
COMMUNITY
End: 2022-12-20

## 2022-12-20 RX ORDER — FLUCONAZOLE 150 MG/1
150 TABLET ORAL DAILY
Qty: 1 TABLET | Refills: 0 | Status: SHIPPED | OUTPATIENT
Start: 2022-12-20 | End: 2022-12-21

## 2022-12-20 RX ORDER — AMOXICILLIN AND CLAVULANATE POTASSIUM 875; 125 MG/1; MG/1
1 TABLET, FILM COATED ORAL 2 TIMES DAILY
Qty: 10 TABLET | Refills: 0 | Status: SHIPPED | OUTPATIENT
Start: 2022-12-20 | End: 2022-12-25

## 2022-12-20 RX ORDER — MONTELUKAST SODIUM 10 MG/1
10 TABLET ORAL DAILY
Qty: 90 TABLET | Refills: 1 | Status: SHIPPED | OUTPATIENT
Start: 2022-12-20 | End: 2023-03-20

## 2022-12-20 ASSESSMENT — FIBROSIS 4 INDEX: FIB4 SCORE: 0.63

## 2022-12-20 NOTE — ASSESSMENT & PLAN NOTE
Since the seasons change she has noticed increase in her allergy symptoms.  In the last month her symptoms have worsened.  She has had runny nose, congestion and sinus pain. She is taking OTC medications without improvement. She is taking chlor tab 4 mg daily and guaifenesin. She did a home COVID test that was negative. Denies fever, chills, ear pain, sore throat, itchy eyes, watery eyes, loss of taste, loss of smell, sick contacts, chest pain or shortness of breath.

## 2022-12-20 NOTE — PROGRESS NOTES
Subjective:     CC: Seasonal allergies    HPI:   Alondra presents today with the following:      Seasonal allergies  Since the seasons change she has noticed increase in her allergy symptoms.  In the last month her symptoms have worsened.  She has had runny nose, congestion and sinus pain. She is taking OTC medications without improvement. She is taking chlor tab 4 mg daily and guaifenesin. She did a home COVID test that was negative. Denies fever, chills, ear pain, sore throat, itchy eyes, watery eyes, loss of taste, loss of smell, sick contacts, chest pain or shortness of breath.      Past Medical History:   Diagnosis Date    Anxiety disorder     Anxiety disorder 3/11/2019    Hot flashes 6/2/2020    Migraine        Social History     Tobacco Use    Smoking status: Every Day     Packs/day: 0.50     Years: 20.00     Pack years: 10.00     Types: Cigarettes    Smokeless tobacco: Never    Tobacco comments:     under a pack a day   Vaping Use    Vaping Use: Never used   Substance Use Topics    Alcohol use: Yes     Comment: rarely    Drug use: No       Current Outpatient Medications Ordered in Epic   Medication Sig Dispense Refill    amoxicillin-clavulanate (AUGMENTIN) 875-125 MG Tab Take 1 Tablet by mouth 2 times a day for 5 days. 10 Tablet 0    fluconazole (DIFLUCAN) 150 MG tablet Take 1 Tablet by mouth every day for 1 dose. 1 Tablet 0    montelukast (SINGULAIR) 10 MG Tab Take 1 Tablet by mouth every day. 90 Tablet 1    topiramate (TOPAMAX) 50 MG tablet Take 1 Tablet by mouth 2 times a day. 180 Tablet 3    butalbital/apap/caffeine (FIORICET) -40 mg Tab Take 1 Tablet by mouth every 6 hours as needed for Headache for up to 30 days. 10 Tablet 0    fluticasone (FLONASE) 50 MCG/ACT nasal spray Administer 1 Spray into affected nostril(S) every day.      guaiFENesin ER (MUCINEX) 600 MG TABLET SR 12 HR Take 600 mg by mouth every 12 hours.      albuterol 108 (90 Base) MCG/ACT Aero Soln inhalation aerosol Inhale 2 Puffs by  "mouth every four hours as needed for Shortness of Breath. 1 Each 6    rizatriptan (MAXALT) 5 MG tablet Take 1 Tab by mouth Once PRN for Migraine for up to 1 dose. 10 Tab 0    asa/apap/caffeine (EXCEDRIN) 250-250-65 MG Tab Take 1 Tab by mouth as needed for Headache.       No current Epic-ordered facility-administered medications on file.       Allergies:  Iodine and Shellfish allergy    Health Maintenance: Reviewed.      Objective:     Vital signs reviewed  Exam:  /76 (BP Location: Left arm, Patient Position: Sitting, BP Cuff Size: Adult)   Pulse 82   Temp 36.4 °C (97.6 °F) (Temporal)   Ht 1.702 m (5' 7\")   Wt 78 kg (172 lb)   SpO2 99%   BMI 26.94 kg/m²  Body mass index is 26.94 kg/m².    General: Normal appearing. No distress.  HENT: Normocephalic. Ears normal shape and contour, canals are clear bilaterally, tympanic membranes are benign, nasal mucosa is erythematous with rhinorrhea, oropharynx is with cobblestoning, no erythema, edema or exudates.  Tenderness on palpation to frontal and maxillary sinuses.  Increased sinus pressure when bending over.  Bilateral tonsils 1+.  Eyes: Eyes conjunctiva clear lids without ptosis, pupils equal and reactive to light accommodation, lids normal.  Pulmonary: Clear to ausculation.  Normal effort. No rales, ronchi, or wheezing.  Cardiovascular: Regular rate and rhythm without murmur.   Lymph: No cervical or supraclavicular lymph nodes are palpable.  Tenderness in the cervical lymph node area.  Skin: Warm and dry.  No obvious lesions.  Psych: Normal mood and affect. Alert and oriented x3. Judgment and insight is normal.      Assessment & Plan:     42 y.o. female with the following -     1. Acute non-recurrent pansinusitis  Acute uncomplicated problem.  Given she has had symptoms for the last month without any improvement and her exam positive today for tenderness on palpation to her sinuses we will treat as sinusitis.  She has tolerated Augmentin in the past.  Start " Augmentin.  Encouraged take medication with food and take either daily probiotic daily yogurt.  Stay hydrated.  May continue with Flonase nasal spray.  - amoxicillin-clavulanate (AUGMENTIN) 875-125 MG Tab; Take 1 Tablet by mouth 2 times a day for 5 days.  Dispense: 10 Tablet; Refill: 0    2. Candida infection  Acute uncomplicated problem.  Patient has tolerated Augmentin in the past but states that it can give her yeast infection.  Asking for dose of fluconazole if needed.  Medication prescribed.  - fluconazole (DIFLUCAN) 150 MG tablet; Take 1 Tablet by mouth every day for 1 dose.  Dispense: 1 Tablet; Refill: 0    3. Seasonal allergies  Chronic exacerbated problem.  She will stop over-the-counter allergy oral medications.  Start montelukast 10 mg daily.  Continue with Flonase nasal spray.  Follow-up in 3 months.  - montelukast (SINGULAIR) 10 MG Tab; Take 1 Tablet by mouth every day.  Dispense: 90 Tablet; Refill: 1      Return in about 3 months (around 3/20/2023) for allergies .    Please note that this dictation was created using voice recognition software. I have made every reasonable attempt to correct obvious errors, but I expect that there are errors of grammar and possibly content that I did not discover before finalizing the note.

## 2022-12-20 NOTE — ASSESSMENT & PLAN NOTE
Smoking 1/2 pack a day for 20 years. She is working on decreasing her use and making healthier choices.

## 2022-12-20 NOTE — ASSESSMENT & PLAN NOTE
She does not take any medication. Due for updated labs. She notes 20 pound weight loss through diet and exercise.

## 2022-12-20 NOTE — ASSESSMENT & PLAN NOTE
"She is taking Topamax 50 mg twice daily, rizatriptan 5 mg as needed (last use once in the last 1-2 months).  When she feels migraine coming on she will take Excedrin. She is taking Fioricet as needed. She states since the dose increase on Topamax she is at 2-3 migraines a month. Without Topamax she was getting \"a lot of migraines\". Stress and anxiety can flare her migraines. She is using Mobie talk therapy. She has been out of Fioricet after last PCP did not want to prescribe.  She is requesting refill on Fioricet today.  "

## 2022-12-20 NOTE — ASSESSMENT & PLAN NOTE
She is taking Chlor tab 4 mg daily and fluticasone nasal spray.  Allergies have been flaring.  Has appointment scheduled for tomorrow.

## 2023-01-04 ENCOUNTER — HOSPITAL ENCOUNTER (OUTPATIENT)
Dept: LAB | Facility: MEDICAL CENTER | Age: 43
End: 2023-01-04
Attending: NURSE PRACTITIONER
Payer: COMMERCIAL

## 2023-01-04 DIAGNOSIS — Z11.59 NEED FOR HEPATITIS C SCREENING TEST: ICD-10-CM

## 2023-01-04 DIAGNOSIS — Z00.00 PREVENTATIVE HEALTH CARE: ICD-10-CM

## 2023-01-04 LAB
ALBUMIN SERPL BCP-MCNC: 4.3 G/DL (ref 3.2–4.9)
ALBUMIN/GLOB SERPL: 1.8 G/DL
ALP SERPL-CCNC: 86 U/L (ref 30–99)
ALT SERPL-CCNC: 19 U/L (ref 2–50)
ANION GAP SERPL CALC-SCNC: 10 MMOL/L (ref 7–16)
AST SERPL-CCNC: 20 U/L (ref 12–45)
BASOPHILS # BLD AUTO: 0.9 % (ref 0–1.8)
BASOPHILS # BLD: 0.13 K/UL (ref 0–0.12)
BILIRUB SERPL-MCNC: 0.4 MG/DL (ref 0.1–1.5)
BUN SERPL-MCNC: 22 MG/DL (ref 8–22)
CALCIUM ALBUM COR SERPL-MCNC: 9.1 MG/DL (ref 8.5–10.5)
CALCIUM SERPL-MCNC: 9.3 MG/DL (ref 8.5–10.5)
CHLORIDE SERPL-SCNC: 112 MMOL/L (ref 96–112)
CHOLEST SERPL-MCNC: 194 MG/DL (ref 100–199)
CO2 SERPL-SCNC: 21 MMOL/L (ref 20–33)
CREAT SERPL-MCNC: 0.79 MG/DL (ref 0.5–1.4)
EOSINOPHIL # BLD AUTO: 0.4 K/UL (ref 0–0.51)
EOSINOPHIL NFR BLD: 2.8 % (ref 0–6.9)
ERYTHROCYTE [DISTWIDTH] IN BLOOD BY AUTOMATED COUNT: 45.5 FL (ref 35.9–50)
GFR SERPLBLD CREATININE-BSD FMLA CKD-EPI: 94 ML/MIN/1.73 M 2
GLOBULIN SER CALC-MCNC: 2.4 G/DL (ref 1.9–3.5)
GLUCOSE SERPL-MCNC: 97 MG/DL (ref 65–99)
HCT VFR BLD AUTO: 44.2 % (ref 37–47)
HCV AB SER QL: NORMAL
HDLC SERPL-MCNC: 54 MG/DL
HGB BLD-MCNC: 14 G/DL (ref 12–16)
IMM GRANULOCYTES # BLD AUTO: 0.05 K/UL (ref 0–0.11)
IMM GRANULOCYTES NFR BLD AUTO: 0.3 % (ref 0–0.9)
LDLC SERPL CALC-MCNC: 130 MG/DL
LYMPHOCYTES # BLD AUTO: 4.39 K/UL (ref 1–4.8)
LYMPHOCYTES NFR BLD: 30.2 % (ref 22–41)
MCH RBC QN AUTO: 29.7 PG (ref 27–33)
MCHC RBC AUTO-ENTMCNC: 31.7 G/DL (ref 33.6–35)
MCV RBC AUTO: 93.6 FL (ref 81.4–97.8)
MONOCYTES # BLD AUTO: 0.81 K/UL (ref 0–0.85)
MONOCYTES NFR BLD AUTO: 5.6 % (ref 0–13.4)
NEUTROPHILS # BLD AUTO: 8.74 K/UL (ref 2–7.15)
NEUTROPHILS NFR BLD: 60.2 % (ref 44–72)
NRBC # BLD AUTO: 0 K/UL
NRBC BLD-RTO: 0 /100 WBC
PLATELET # BLD AUTO: 266 K/UL (ref 164–446)
PMV BLD AUTO: 10.8 FL (ref 9–12.9)
POTASSIUM SERPL-SCNC: 4.8 MMOL/L (ref 3.6–5.5)
PROT SERPL-MCNC: 6.7 G/DL (ref 6–8.2)
RBC # BLD AUTO: 4.72 M/UL (ref 4.2–5.4)
SODIUM SERPL-SCNC: 143 MMOL/L (ref 135–145)
TRIGL SERPL-MCNC: 48 MG/DL (ref 0–149)
WBC # BLD AUTO: 14.5 K/UL (ref 4.8–10.8)

## 2023-01-04 PROCEDURE — 86803 HEPATITIS C AB TEST: CPT

## 2023-01-04 PROCEDURE — 80053 COMPREHEN METABOLIC PANEL: CPT

## 2023-01-04 PROCEDURE — 80061 LIPID PANEL: CPT

## 2023-01-04 PROCEDURE — 36415 COLL VENOUS BLD VENIPUNCTURE: CPT

## 2023-01-04 PROCEDURE — 85025 COMPLETE CBC W/AUTO DIFF WBC: CPT

## 2023-01-05 DIAGNOSIS — D72.828 OTHER ELEVATED WHITE BLOOD CELL (WBC) COUNT: ICD-10-CM

## 2023-01-06 NOTE — PROGRESS NOTES
Recent labs show elevated WBC. Kidney and liver function WNL. Recheck CBC, folate and TSH in 2 weeks.

## 2023-01-19 ENCOUNTER — HOSPITAL ENCOUNTER (OUTPATIENT)
Dept: LAB | Facility: MEDICAL CENTER | Age: 43
End: 2023-01-19
Attending: NURSE PRACTITIONER
Payer: COMMERCIAL

## 2023-01-19 DIAGNOSIS — D72.828 OTHER ELEVATED WHITE BLOOD CELL (WBC) COUNT: ICD-10-CM

## 2023-01-19 LAB
BASOPHILS # BLD AUTO: 0.7 % (ref 0–1.8)
BASOPHILS # BLD: 0.09 K/UL (ref 0–0.12)
EOSINOPHIL # BLD AUTO: 0.35 K/UL (ref 0–0.51)
EOSINOPHIL NFR BLD: 2.7 % (ref 0–6.9)
ERYTHROCYTE [DISTWIDTH] IN BLOOD BY AUTOMATED COUNT: 44.4 FL (ref 35.9–50)
FOLATE SERPL-MCNC: 10.2 NG/ML
HCT VFR BLD AUTO: 45.6 % (ref 37–47)
HGB BLD-MCNC: 14.3 G/DL (ref 12–16)
IMM GRANULOCYTES # BLD AUTO: 0.06 K/UL (ref 0–0.11)
IMM GRANULOCYTES NFR BLD AUTO: 0.5 % (ref 0–0.9)
LYMPHOCYTES # BLD AUTO: 4.22 K/UL (ref 1–4.8)
LYMPHOCYTES NFR BLD: 32.3 % (ref 22–41)
MCH RBC QN AUTO: 29.4 PG (ref 27–33)
MCHC RBC AUTO-ENTMCNC: 31.4 G/DL (ref 33.6–35)
MCV RBC AUTO: 93.8 FL (ref 81.4–97.8)
MONOCYTES # BLD AUTO: 0.78 K/UL (ref 0–0.85)
MONOCYTES NFR BLD AUTO: 6 % (ref 0–13.4)
NEUTROPHILS # BLD AUTO: 7.56 K/UL (ref 2–7.15)
NEUTROPHILS NFR BLD: 57.8 % (ref 44–72)
NRBC # BLD AUTO: 0 K/UL
NRBC BLD-RTO: 0 /100 WBC
PLATELET # BLD AUTO: 261 K/UL (ref 164–446)
PMV BLD AUTO: 11 FL (ref 9–12.9)
RBC # BLD AUTO: 4.86 M/UL (ref 4.2–5.4)
TSH SERPL DL<=0.005 MIU/L-ACNC: 1.78 UIU/ML (ref 0.38–5.33)
WBC # BLD AUTO: 13.1 K/UL (ref 4.8–10.8)

## 2023-01-19 PROCEDURE — 85025 COMPLETE CBC W/AUTO DIFF WBC: CPT

## 2023-01-19 PROCEDURE — 82746 ASSAY OF FOLIC ACID SERUM: CPT

## 2023-01-19 PROCEDURE — 36415 COLL VENOUS BLD VENIPUNCTURE: CPT

## 2023-01-19 PROCEDURE — 84443 ASSAY THYROID STIM HORMONE: CPT

## 2023-01-20 ENCOUNTER — PATIENT MESSAGE (OUTPATIENT)
Dept: MEDICAL GROUP | Facility: PHYSICIAN GROUP | Age: 43
End: 2023-01-20
Payer: COMMERCIAL

## 2023-01-20 DIAGNOSIS — Z71.9 ENCOUNTER FOR CONSULTATION: ICD-10-CM

## 2023-01-25 ENCOUNTER — E-CONSULT (OUTPATIENT)
Dept: HEMATOLOGY ONCOLOGY | Facility: MEDICAL CENTER | Age: 43
End: 2023-01-25
Payer: COMMERCIAL

## 2023-01-25 DIAGNOSIS — Z71.9 ENCOUNTER FOR CONSULTATION: ICD-10-CM

## 2023-01-25 PROCEDURE — 99446 NTRPROF PH1/NTRNET/EHR 5-10: CPT | Performed by: INTERNAL MEDICINE

## 2023-01-25 NOTE — PROGRESS NOTES
Patient has persistent elevation in total WBC and absolute neutrophils. Patient agreeable to e-consult to hematology. E-consult completed today.

## 2023-01-25 NOTE — PROGRESS NOTES
E-Consult Response     After careful review of the patient's information available in the medical record, the following are my findings and recommendations:    Reason for consult:  42 female wit leukocytosis    Summary of data reviewed: 3 CBC's since 11/21 with WBC between 13.1 and 14.5. Always mature neutrophils with no abnormal increase in immature cells. H/H and platelet counts normal.     Recommendations: I probably would just follow it and make sure there is no underlying inflammatory issue. Doesn't look like a primary hematologic problem.     E-Consult Time: 10 minutes were spent with >50% of the total time spent reviewing items outlined in the summary of data reviewed (Use code [id number])    Frank Bee M.D.

## 2023-01-27 ENCOUNTER — OFFICE VISIT (OUTPATIENT)
Dept: URGENT CARE | Facility: PHYSICIAN GROUP | Age: 43
End: 2023-01-27
Payer: COMMERCIAL

## 2023-01-27 ENCOUNTER — HOSPITAL ENCOUNTER (OUTPATIENT)
Facility: MEDICAL CENTER | Age: 43
End: 2023-01-27
Attending: NURSE PRACTITIONER
Payer: COMMERCIAL

## 2023-01-27 ENCOUNTER — PATIENT MESSAGE (OUTPATIENT)
Dept: MEDICAL GROUP | Facility: PHYSICIAN GROUP | Age: 43
End: 2023-01-27

## 2023-01-27 ENCOUNTER — PHARMACY VISIT (OUTPATIENT)
Dept: PHARMACY | Facility: MEDICAL CENTER | Age: 43
End: 2023-01-27
Payer: COMMERCIAL

## 2023-01-27 VITALS
HEART RATE: 89 BPM | TEMPERATURE: 96.4 F | HEIGHT: 67 IN | WEIGHT: 164.9 LBS | RESPIRATION RATE: 14 BRPM | OXYGEN SATURATION: 98 % | DIASTOLIC BLOOD PRESSURE: 74 MMHG | SYSTOLIC BLOOD PRESSURE: 114 MMHG | BODY MASS INDEX: 25.88 KG/M2

## 2023-01-27 DIAGNOSIS — R42 DIZZINESS: ICD-10-CM

## 2023-01-27 DIAGNOSIS — H69.93 DYSFUNCTION OF BOTH EUSTACHIAN TUBES: ICD-10-CM

## 2023-01-27 DIAGNOSIS — R73.01 ELEVATED FASTING GLUCOSE: ICD-10-CM

## 2023-01-27 DIAGNOSIS — J98.8 VIRAL RESPIRATORY ILLNESS: ICD-10-CM

## 2023-01-27 DIAGNOSIS — R06.2 WHEEZING: ICD-10-CM

## 2023-01-27 DIAGNOSIS — B97.89 VIRAL RESPIRATORY ILLNESS: ICD-10-CM

## 2023-01-27 DIAGNOSIS — D72.828 OTHER ELEVATED WHITE BLOOD CELL (WBC) COUNT: ICD-10-CM

## 2023-01-27 LAB
EXTERNAL QUALITY CONTROL: NORMAL
FLUAV+FLUBV AG SPEC QL IA: NORMAL
INT CON NEG: NEGATIVE
INT CON NEG: NEGATIVE
INT CON POS: POSITIVE
INT CON POS: POSITIVE
SARS-COV+SARS-COV-2 AG RESP QL IA.RAPID: NEGATIVE

## 2023-01-27 PROCEDURE — RXMED WILLOW AMBULATORY MEDICATION CHARGE: Performed by: NURSE PRACTITIONER

## 2023-01-27 PROCEDURE — U0005 INFEC AGEN DETEC AMPLI PROBE: HCPCS

## 2023-01-27 PROCEDURE — 99213 OFFICE O/P EST LOW 20 MIN: CPT | Performed by: NURSE PRACTITIONER

## 2023-01-27 PROCEDURE — 87804 INFLUENZA ASSAY W/OPTIC: CPT | Performed by: NURSE PRACTITIONER

## 2023-01-27 PROCEDURE — U0003 INFECTIOUS AGENT DETECTION BY NUCLEIC ACID (DNA OR RNA); SEVERE ACUTE RESPIRATORY SYNDROME CORONAVIRUS 2 (SARS-COV-2) (CORONAVIRUS DISEASE [COVID-19]), AMPLIFIED PROBE TECHNIQUE, MAKING USE OF HIGH THROUGHPUT TECHNOLOGIES AS DESCRIBED BY CMS-2020-01-R: HCPCS

## 2023-01-27 PROCEDURE — 87426 SARSCOV CORONAVIRUS AG IA: CPT | Performed by: NURSE PRACTITIONER

## 2023-01-27 RX ORDER — ALBUTEROL SULFATE 90 UG/1
1-2 AEROSOL, METERED RESPIRATORY (INHALATION) EVERY 4 HOURS PRN
Qty: 8.5 G | Refills: 0 | Status: SHIPPED | OUTPATIENT
Start: 2023-01-27

## 2023-01-27 RX ORDER — METHYLPREDNISOLONE 4 MG/1
TABLET ORAL
Qty: 21 TABLET | Refills: 0 | Status: SHIPPED | OUTPATIENT
Start: 2023-01-27 | End: 2023-03-20

## 2023-01-27 RX ORDER — MECLIZINE HYDROCHLORIDE 25 MG/1
25 TABLET ORAL 3 TIMES DAILY PRN
Qty: 30 TABLET | Refills: 0 | Status: SHIPPED | OUTPATIENT
Start: 2023-01-27

## 2023-01-27 ASSESSMENT — ENCOUNTER SYMPTOMS
DIZZINESS: 1
SPUTUM PRODUCTION: 1
NAUSEA: 0
HEMOPTYSIS: 0
WHEEZING: 1
SORE THROAT: 1
VOMITING: 0
HEADACHES: 1
DIARRHEA: 0
FEVER: 0
COUGH: 1
SHORTNESS OF BREATH: 1

## 2023-01-27 ASSESSMENT — FIBROSIS 4 INDEX: FIB4 SCORE: 0.77

## 2023-01-27 NOTE — PATIENT INSTRUCTIONS

## 2023-01-27 NOTE — LETTER
January 27, 2023         Patient: Alondra Huerta   YOB: 1980   Date of Visit: 1/27/2023           To Whom it May Concern:    Alondra Huerta was seen in my clinic on 1/27/2023. She may return to work on 2/1/2023. Please include associated missed work this week.    If you have any questions or concerns, please don't hesitate to call.        Sincerely,           SAMANTHA Pineda.  Electronically Signed

## 2023-01-27 NOTE — PROGRESS NOTES
Subjective:     Alondra Huerta is a 42 y.o. female who presents for Nasal Congestion (Since Tuesday, trouble sleeping, OTC: saul seltzer cold/flu), Dizziness (Comes on randomly), Shortness of Breath (O2 normal. Trouble catching breath at [facility]), Cough, Ear Fullness (Along with dizziness), and Sinus Problem (Pressure)      Started on Tuesday. Had resolution of sinus infection 3 weeks ago.     Dizziness  Associated symptoms include congestion, coughing, headaches and a sore throat. Pertinent negatives include no fever, nausea or vomiting. Treatments tried: KATIE. OTC cold and sinus.   Shortness of Breath  Associated symptoms include headaches, a sore throat, sputum production and wheezing. Pertinent negatives include no ear pain, fever, hemoptysis or vomiting.   Cough  This is a new problem. The current episode started in the past 7 days. Associated symptoms include ear congestion, headaches, nasal congestion, a sore throat, shortness of breath and wheezing. Pertinent negatives include no ear pain, fever or hemoptysis.   Ear Fullness  Associated symptoms include congestion, coughing, headaches and a sore throat. Pertinent negatives include no fever, nausea or vomiting.   Sinus Problem  Associated symptoms include congestion, coughing, headaches, shortness of breath and a sore throat. Pertinent negatives include no ear pain.     Past Medical History:   Diagnosis Date    Anxiety disorder     Anxiety disorder 3/11/2019    Hot flashes 6/2/2020    Migraine        Past Surgical History:   Procedure Laterality Date    ANKLE ARTHROSCOPY Left 5/15/2017    Procedure: ANKLE ARTHROSCOPY;  Surgeon: Vaibhav Casper M.D.;  Location: SURGERY [facility];  Service:     ANKLE LIGAMENT RECONSTRUCTION  5/15/2017    Procedure: ANKLE LIGAMENT RECONSTRUCTION-LATERAL;  Surgeon: Vaibhav Casper M.D.;  Location: SURGERY [facility];  Service:     VAGINAL HYSTERECTOMY SCOPE TOTAL  5/22/2009    Performed by SHELLY GUNDERSON  SURGERY SAME DAY [facility]    SALPINGECTOMY  5/22/2009    Performed by SHELLY GUNDERSON at SURGERY SAME DAY [facility]    ANKLE ARTHROSCOPY Left     GYN SURGERY  1999,2005    C Section x2     PRIMARY C SECTION      twice        Social History     Socioeconomic History    Marital status:      Spouse name: Not on file    Number of children: Not on file    Years of education: Not on file    Highest education level: 12th grade   Occupational History    Not on file   Tobacco Use    Smoking status: Every Day     Packs/day: 0.50     Years: 20.00     Pack years: 10.00     Types: Cigarettes    Smokeless tobacco: Never    Tobacco comments:     under a pack a day   Vaping Use    Vaping Use: Never used   Substance and Sexual Activity    Alcohol use: Yes     Comment: rarely    Drug use: No    Sexual activity: Yes     Partners: Male     Birth control/protection: Surgical   Other Topics Concern    Not on file   Social History Narrative    Not on file     Social Determinants of Health     Financial Resource Strain: Low Risk     Difficulty of Paying Living Expenses: Not hard at all   Food Insecurity: No Food Insecurity    Worried About Running Out of Food in the Last Year: Never true    Ran Out of Food in the Last Year: Never true   Transportation Needs: No Transportation Needs    Lack of Transportation (Medical): No    Lack of Transportation (Non-Medical): No   Physical Activity: Insufficiently Active    Days of Exercise per Week: 3 days    Minutes of Exercise per Session: 30 min   Stress: No Stress Concern Present    Feeling of Stress : Only a little   Social Connections: Socially Isolated    Frequency of Communication with Friends and Family: Three times a week    Frequency of Social Gatherings with Friends and Family: Once a week    Attends Mandaeism Services: Never    Active Member of Clubs or Organizations: No    Attends Club or Organization Meetings: Never    Marital Status:    Intimate Partner Violence:  "Not on file   Housing Stability: Low Risk     Unable to Pay for Housing in the Last Year: No    Number of Places Lived in the Last Year: 1    Unstable Housing in the Last Year: No        Family History   Problem Relation Age of Onset    No Known Problems Mother     Diabetes Father     Heart Disease Father 50        Heart attack     Hypertension Father     Other Sister         migraine    No Known Problems Sister     No Known Problems Sister     Sleep Apnea Daughter     No Known Problems Son         Allergies   Allergen Reactions    Iodine Anaphylaxis and Shortness of Breath     IV contrast     Shellfish Allergy Anaphylaxis       Review of Systems   Constitutional:  Positive for malaise/fatigue. Negative for fever.   HENT:  Positive for congestion and sore throat. Negative for ear pain.    Respiratory:  Positive for cough, sputum production, shortness of breath and wheezing. Negative for hemoptysis.    Gastrointestinal:  Negative for diarrhea, nausea and vomiting.   Neurological:  Positive for dizziness and headaches.   All other systems reviewed and are negative.     Objective:   /74   Pulse 89   Temp (!) 35.8 °C (96.4 °F) (Temporal)   Resp 14   Ht 1.702 m (5' 7\")   Wt 74.8 kg (164 lb 14.4 oz)   SpO2 98%   BMI 25.83 kg/m²     Physical Exam  Vitals reviewed.   Constitutional:       General: She is not in acute distress.     Appearance: She is well-developed. She is not toxic-appearing.   HENT:      Head: Normocephalic and atraumatic.      Right Ear: External ear normal. No drainage, swelling or tenderness. A middle ear effusion is present. Tympanic membrane is not perforated or erythematous.      Left Ear: External ear normal. No drainage, swelling or tenderness. A middle ear effusion is present. Tympanic membrane is not perforated or erythematous.      Nose: Mucosal edema present.      Mouth/Throat:      Mouth: Mucous membranes are moist.      Pharynx: Oropharynx is clear.   Eyes:      Extraocular " Movements: Extraocular movements intact.      Conjunctiva/sclera: Conjunctivae normal.      Pupils: Pupils are equal, round, and reactive to light.   Cardiovascular:      Rate and Rhythm: Normal rate.   Pulmonary:      Effort: Pulmonary effort is normal. No respiratory distress.      Breath sounds: Normal breath sounds. No stridor. No wheezing, rhonchi or rales.   Musculoskeletal:         General: Normal range of motion.      Cervical back: Normal range of motion.   Skin:     General: Skin is warm and dry.      Findings: No rash.   Neurological:      Mental Status: She is alert and oriented to person, place, and time.      GCS: GCS eye subscore is 4. GCS verbal subscore is 5. GCS motor subscore is 6.      Cranial Nerves: No facial asymmetry.      Motor: No weakness.      Coordination: Coordination is intact. Romberg sign negative.      Gait: Gait is intact.   Psychiatric:         Speech: Speech normal.         Behavior: Behavior normal.         Thought Content: Thought content normal.         Judgment: Judgment normal.       Assessment/Plan:   1. Viral respiratory illness  - POCT SARS-COV Antigen EMMANUEL (Symptomatic only)  - POCT Influenza A/B  - SARS-CoV-2 PCR (24 hour In-House): Collect NP swab in [facility]; Future    2. Dysfunction of both eustachian tubes  - methylPREDNISolone (MEDROL DOSEPAK) 4 MG Tablet Therapy Pack; Follow schedule on package instructions.  Dispense: 21 Tablet; Refill: 0  - meclizine (ANTIVERT) 25 MG Tab; Take 1 Tablet by mouth 3 times a day as needed for Dizziness.  Dispense: 30 Tablet; Refill: 0    3. Wheezing  - albuterol 108 (90 Base) MCG/ACT Aero Soln inhalation aerosol; Inhale 1-2 Puffs every four hours as needed for Shortness of Breath.  Dispense: 8.5 g; Refill: 0  - methylPREDNISolone (MEDROL DOSEPAK) 4 MG Tablet Therapy Pack; Follow schedule on package instructions.  Dispense: 21 Tablet; Refill: 0    4. Dizziness  - meclizine (ANTIVERT) 25 MG Tab; Take 1 Tablet by mouth 3 times a day as  needed for Dizziness.  Dispense: 30 Tablet; Refill: 0    Results for orders placed or performed in visit on 23   POCT SARS-COV Antigen EMMANUEL (Symptomatic only)   Result Value Ref Range    Internal  Valid     SARS-COV ANTIGEN EMMANUEL Negative Negative, Indeterminate, None Detected, Not Detected, Detected, NotDetected, Valid, Invalid, Pass    Internal Control Positive Positive     Internal Control Negative Negative    POCT Influenza A/B   Result Value Ref Range    Rapid Influenza A-B NEG     Internal Control Positive Positive     Internal Control Negative Negative      Symptomatic care.  -Oral hydration and rest.   -Cough control: nonpharmacologic options for cough relief such as throat lozenges, hot tea, honey.  -Over the counter expectorant as directed; Guaifenesin (Mucinex).  -Tylenol or ibuprofen for pain and fever as directed.   -Warm salt water gargles.  -OTC Throat lozenges or spray (Cepacol).    Seek emergency medical care immediately for: Trouble breathing, persistent pain or pressure in the chest, confusion, inability to wake or stay awake, bluish lips or face, persistent tachycardia (fast heart rate), prolonged dizziness, persistent high grade fevers. Follow up for prolonged cough, persistent wheezing, persistent throat pain, difficulty swallowing, persistent fevers, persistent sinus symptoms, or any other concerns. Follow up with your Primary Care Provider.     -Return of acute URI symptoms. Discussed viral etiology. COVID S&S, and self isolation guidelines. S&S of PNA with follow up. Stable Vitals. KATIE . Subjective SOB and wheezing. Contingent medrol.     Differential diagnosis, natural history, supportive care, and indications for immediate follow-up discussed.

## 2023-01-28 LAB
SARS-COV-2 RNA RESP QL NAA+PROBE: NOTDETECTED
SPECIMEN SOURCE: NORMAL

## 2023-01-30 NOTE — PROGRESS NOTES
Recent hematology E consult completed who recommended following her labs.  Plan to recheck CBC, ESR and A1c in 3 months around end of April early May 2023.

## 2023-03-20 ENCOUNTER — OFFICE VISIT (OUTPATIENT)
Dept: MEDICAL GROUP | Facility: PHYSICIAN GROUP | Age: 43
End: 2023-03-20
Payer: COMMERCIAL

## 2023-03-20 VITALS
BODY MASS INDEX: 27 KG/M2 | WEIGHT: 172 LBS | OXYGEN SATURATION: 96 % | TEMPERATURE: 97.3 F | HEART RATE: 80 BPM | SYSTOLIC BLOOD PRESSURE: 132 MMHG | DIASTOLIC BLOOD PRESSURE: 80 MMHG | HEIGHT: 67 IN

## 2023-03-20 DIAGNOSIS — J30.2 SEASONAL ALLERGIES: ICD-10-CM

## 2023-03-20 DIAGNOSIS — R23.2 HOT FLASHES: ICD-10-CM

## 2023-03-20 DIAGNOSIS — G43.109 MIGRAINE WITH AURA AND WITHOUT STATUS MIGRAINOSUS, NOT INTRACTABLE: ICD-10-CM

## 2023-03-20 PROCEDURE — RXMED WILLOW AMBULATORY MEDICATION CHARGE: Performed by: NURSE PRACTITIONER

## 2023-03-20 PROCEDURE — 99214 OFFICE O/P EST MOD 30 MIN: CPT | Performed by: NURSE PRACTITIONER

## 2023-03-20 RX ORDER — TOPIRAMATE 100 MG/1
100 TABLET, FILM COATED ORAL 2 TIMES DAILY
Qty: 180 TABLET | Refills: 1 | Status: SHIPPED | OUTPATIENT
Start: 2023-03-20 | End: 2023-09-18 | Stop reason: SDUPTHER

## 2023-03-20 ASSESSMENT — FIBROSIS 4 INDEX: FIB4 SCORE: 0.79

## 2023-03-20 ASSESSMENT — PATIENT HEALTH QUESTIONNAIRE - PHQ9: CLINICAL INTERPRETATION OF PHQ2 SCORE: 0

## 2023-03-20 NOTE — ASSESSMENT & PLAN NOTE
She continues with Topamax 50 mg twice daily. In the last 2 weeks her migraines have worsened. She has had weekly migraines for the last 3 weeks that are lasting 3 days on average. She has had to call out sick from work. She continues with Excedrin, has not used 4 doses of Fioricet and takes rizatriptan at home x 3 doses. No new stress. Before the last 3 weeks she was getting migraines 1-2 times a month.

## 2023-03-20 NOTE — ASSESSMENT & PLAN NOTE
She did not notice difference with montelukast and felt that it actually worsened her nasal congestion. She is not using nasal sprays consistently.

## 2023-03-20 NOTE — ASSESSMENT & PLAN NOTE
Her hot flashes started several years ago and then went away. During the last 2 weeks she is having hot flashes at home and at work. History of hysterectomy by Dr. Pérez in 2009 due to endometriosis. She has 1/4 of ovary left.  Not currently taking any medication or supplementation.  She is interested in options for treatment.

## 2023-03-20 NOTE — PROGRESS NOTES
Subjective:     CC: migraines, hot flashes    HPI:   Alondra presents today with the following:    Migraine with aura and without status migrainosus, not intractable  She continues with Topamax 50 mg twice daily. In the last 2 weeks her migraines have worsened. She has had weekly migraines for the last 3 weeks that are lasting 3 days on average. She has had to call out sick from work. She continues with Excedrin, has not used 4 doses of Fioricet and takes rizatriptan at home x 3 doses. No new stress. Before the last 3 weeks she was getting migraines 1-2 times a month.    Hot flashes  Her hot flashes started several years ago and then went away. During the last 2 weeks she is having hot flashes at home and at work. History of hysterectomy by Dr. Pérez in 2009 due to endometriosis. She has 1/4 of ovary left.  Not currently taking any medication or supplementation.  She is interested in options for treatment.    Seasonal allergies  She did not notice difference with montelukast. She is not using nasal sprays consistently.     Past Medical History:   Diagnosis Date    Anxiety disorder     Anxiety disorder 3/11/2019    Hot flashes 6/2/2020    Migraine        Social History     Tobacco Use    Smoking status: Every Day     Packs/day: 0.50     Years: 20.00     Pack years: 10.00     Types: Cigarettes    Smokeless tobacco: Never    Tobacco comments:     under a pack a day   Vaping Use    Vaping Use: Never used   Substance Use Topics    Alcohol use: Yes     Comment: rarely    Drug use: No       Current Outpatient Medications Ordered in Epic   Medication Sig Dispense Refill    topiramate (TOPAMAX) 100 MG Tab Take 1 Tablet by mouth 2 times a day. 180 Tablet 1    meclizine (ANTIVERT) 25 MG Tab Take 1 Tablet by mouth 3 times a day as needed for Dizziness. 30 Tablet 0    fluticasone (FLONASE) 50 MCG/ACT nasal spray Administer 1 Spray into affected nostril(S) every day.      guaiFENesin ER (MUCINEX) 600 MG TABLET SR 12 HR Take 600 mg  "by mouth every 12 hours.      rizatriptan (MAXALT) 5 MG tablet Take 1 Tab by mouth Once PRN for Migraine for up to 1 dose. 10 Tab 0    asa/apap/caffeine (EXCEDRIN) 250-250-65 MG Tab Take 1 Tab by mouth as needed for Headache.      albuterol 108 (90 Base) MCG/ACT Aero Soln inhalation aerosol Inhale 1-2 Puffs every four hours as needed for Shortness of Breath. 8.5 g 0     No current Epic-ordered facility-administered medications on file.       Allergies:  Iodine and Shellfish allergy    Health Maintenance: Reviewed      Objective:Rev     Vital signs reviewed  Exam:  /80 (BP Location: Right arm, Patient Position: Sitting, BP Cuff Size: Adult)   Pulse 80   Temp 36.3 °C (97.3 °F) (Temporal)   Ht 1.702 m (5' 7\")   Wt 78 kg (172 lb)   SpO2 96%   BMI 26.94 kg/m²  Body mass index is 26.94 kg/m².    Gen: Alert and oriented, No apparent distress.  Neck: Neck is supple, full ROM.  Lungs: Normal effort, no audible wheezes  CV: Skin pink, warm and dry.  Ext: No clubbing, cyanosis, edema.      Assessment & Plan:     43 y.o. female with the following -     1. Migraine with aura and without status migrainosus, not intractable  Chronic exacerbated problem.  She denies any new stressors or changes.  We discussed increasing her topiramate to 100 mg twice daily which is the max dose.  She will continue with her rizatriptan 5 mg as needed, over-the-counter Excedrin as needed and Fioricet as needed.  Could also consider switching to Venlafaxine that could potentially help with both her migraine prophylaxis and hot flashes.  - topiramate (TOPAMAX) 100 MG Tab; Take 1 Tablet by mouth 2 times a day.  Dispense: 180 Tablet; Refill: 1    2. Hot flashes  Chronic exacerbated problem.  She would like to start with supplement first.  Discussed she can start with black cohosh.  I used up-to-date to verify medication interactions I did not find any specially with her topiramate.  Consider gabapentin or Venlafaxine in future.  Recommend " that she dress in cotton layers, use fans as needed.    3. Seasonal allergies  Chronic exacerbated problem.  Encouraged her to use her fluticasone more consistently.  We will stop her montelukast today.  If no improvement consider switching to alternative nasal steroid spray with possible furl to allergist.        Return in about 8 weeks (around 5/15/2023) for migraines, hot flashes .    Please note that this dictation was created using voice recognition software. I have made every reasonable attempt to correct obvious errors, but I expect that there are errors of grammar and possibly content that I did not discover before finalizing the note.

## 2023-03-21 ENCOUNTER — PHARMACY VISIT (OUTPATIENT)
Dept: PHARMACY | Facility: MEDICAL CENTER | Age: 43
End: 2023-03-21
Payer: COMMERCIAL

## 2023-04-20 ENCOUNTER — HOSPITAL ENCOUNTER (OUTPATIENT)
Dept: LAB | Facility: MEDICAL CENTER | Age: 43
End: 2023-04-20
Attending: NURSE PRACTITIONER
Payer: COMMERCIAL

## 2023-04-20 DIAGNOSIS — D72.828 OTHER ELEVATED WHITE BLOOD CELL (WBC) COUNT: ICD-10-CM

## 2023-04-20 DIAGNOSIS — R73.01 ELEVATED FASTING GLUCOSE: ICD-10-CM

## 2023-04-20 LAB
BASOPHILS # BLD AUTO: 0.6 % (ref 0–1.8)
BASOPHILS # BLD: 0.08 K/UL (ref 0–0.12)
EOSINOPHIL # BLD AUTO: 0.64 K/UL (ref 0–0.51)
EOSINOPHIL NFR BLD: 4.8 % (ref 0–6.9)
ERYTHROCYTE [DISTWIDTH] IN BLOOD BY AUTOMATED COUNT: 44.7 FL (ref 35.9–50)
ERYTHROCYTE [SEDIMENTATION RATE] IN BLOOD BY WESTERGREN METHOD: 3 MM/HOUR (ref 0–25)
EST. AVERAGE GLUCOSE BLD GHB EST-MCNC: 108 MG/DL
HBA1C MFR BLD: 5.4 % (ref 4–5.6)
HCT VFR BLD AUTO: 44.2 % (ref 37–47)
HGB BLD-MCNC: 14.1 G/DL (ref 12–16)
IMM GRANULOCYTES # BLD AUTO: 0.04 K/UL (ref 0–0.11)
IMM GRANULOCYTES NFR BLD AUTO: 0.3 % (ref 0–0.9)
LYMPHOCYTES # BLD AUTO: 4.34 K/UL (ref 1–4.8)
LYMPHOCYTES NFR BLD: 32.6 % (ref 22–41)
MCH RBC QN AUTO: 29.9 PG (ref 27–33)
MCHC RBC AUTO-ENTMCNC: 31.9 G/DL (ref 33.6–35)
MCV RBC AUTO: 93.6 FL (ref 81.4–97.8)
MONOCYTES # BLD AUTO: 0.83 K/UL (ref 0–0.85)
MONOCYTES NFR BLD AUTO: 6.2 % (ref 0–13.4)
NEUTROPHILS # BLD AUTO: 7.4 K/UL (ref 2–7.15)
NEUTROPHILS NFR BLD: 55.5 % (ref 44–72)
NRBC # BLD AUTO: 0 K/UL
NRBC BLD-RTO: 0 /100 WBC
PLATELET # BLD AUTO: 263 K/UL (ref 164–446)
PMV BLD AUTO: 10.4 FL (ref 9–12.9)
RBC # BLD AUTO: 4.72 M/UL (ref 4.2–5.4)
WBC # BLD AUTO: 13.3 K/UL (ref 4.8–10.8)

## 2023-04-20 PROCEDURE — 36415 COLL VENOUS BLD VENIPUNCTURE: CPT

## 2023-04-20 PROCEDURE — 83036 HEMOGLOBIN GLYCOSYLATED A1C: CPT

## 2023-04-20 PROCEDURE — 85652 RBC SED RATE AUTOMATED: CPT

## 2023-04-20 PROCEDURE — 85025 COMPLETE CBC W/AUTO DIFF WBC: CPT

## 2023-04-25 ENCOUNTER — PATIENT MESSAGE (OUTPATIENT)
Dept: MEDICAL GROUP | Facility: PHYSICIAN GROUP | Age: 43
End: 2023-04-25
Payer: COMMERCIAL

## 2023-04-25 DIAGNOSIS — R82.90 CLOUDY URINE: ICD-10-CM

## 2023-05-02 NOTE — PROGRESS NOTES
Continues to have elevated WBC.  Notes to have cloudy urine we will check urinalysis for urinary tract infection.

## 2023-05-03 ENCOUNTER — HOSPITAL ENCOUNTER (OUTPATIENT)
Dept: LAB | Facility: MEDICAL CENTER | Age: 43
End: 2023-05-03
Attending: NURSE PRACTITIONER
Payer: COMMERCIAL

## 2023-05-03 DIAGNOSIS — R82.90 CLOUDY URINE: ICD-10-CM

## 2023-05-03 LAB
AMORPH CRY #/AREA URNS HPF: PRESENT /HPF
APPEARANCE UR: ABNORMAL
BACTERIA #/AREA URNS HPF: NEGATIVE /HPF
BILIRUB UR QL STRIP.AUTO: NEGATIVE
COLOR UR: YELLOW
EPI CELLS #/AREA URNS HPF: ABNORMAL /HPF
GLUCOSE UR STRIP.AUTO-MCNC: NEGATIVE MG/DL
GRAN CASTS #/AREA URNS LPF: ABNORMAL /LPF
HYALINE CASTS #/AREA URNS LPF: ABNORMAL /LPF
KETONES UR STRIP.AUTO-MCNC: NEGATIVE MG/DL
LEUKOCYTE ESTERASE UR QL STRIP.AUTO: NEGATIVE
MICRO URNS: ABNORMAL
NITRITE UR QL STRIP.AUTO: NEGATIVE
PH UR STRIP.AUTO: 6.5 [PH] (ref 5–8)
PROT UR QL STRIP: NEGATIVE MG/DL
RBC # URNS HPF: ABNORMAL /HPF
RBC UR QL AUTO: NEGATIVE
SP GR UR STRIP.AUTO: 1.02
UROBILINOGEN UR STRIP.AUTO-MCNC: 0.2 MG/DL
WBC #/AREA URNS HPF: ABNORMAL /HPF

## 2023-05-03 PROCEDURE — 81001 URINALYSIS AUTO W/SCOPE: CPT

## 2023-05-23 ENCOUNTER — OFFICE VISIT (OUTPATIENT)
Dept: MEDICAL GROUP | Facility: PHYSICIAN GROUP | Age: 43
End: 2023-05-23
Payer: COMMERCIAL

## 2023-05-23 ENCOUNTER — PHARMACY VISIT (OUTPATIENT)
Dept: PHARMACY | Facility: MEDICAL CENTER | Age: 43
End: 2023-05-23
Payer: COMMERCIAL

## 2023-05-23 VITALS
WEIGHT: 164 LBS | HEART RATE: 82 BPM | SYSTOLIC BLOOD PRESSURE: 110 MMHG | HEIGHT: 67 IN | BODY MASS INDEX: 25.74 KG/M2 | DIASTOLIC BLOOD PRESSURE: 80 MMHG | TEMPERATURE: 96.8 F | OXYGEN SATURATION: 99 %

## 2023-05-23 DIAGNOSIS — Z12.12 SCREENING FOR COLORECTAL CANCER: ICD-10-CM

## 2023-05-23 DIAGNOSIS — G43.109 MIGRAINE WITH AURA AND WITHOUT STATUS MIGRAINOSUS, NOT INTRACTABLE: ICD-10-CM

## 2023-05-23 DIAGNOSIS — Z12.11 SCREENING FOR COLORECTAL CANCER: ICD-10-CM

## 2023-05-23 DIAGNOSIS — R23.2 HOT FLASHES: ICD-10-CM

## 2023-05-23 PROCEDURE — 99214 OFFICE O/P EST MOD 30 MIN: CPT | Performed by: NURSE PRACTITIONER

## 2023-05-23 PROCEDURE — RXMED WILLOW AMBULATORY MEDICATION CHARGE: Performed by: NURSE PRACTITIONER

## 2023-05-23 PROCEDURE — 3079F DIAST BP 80-89 MM HG: CPT | Performed by: NURSE PRACTITIONER

## 2023-05-23 PROCEDURE — 3074F SYST BP LT 130 MM HG: CPT | Performed by: NURSE PRACTITIONER

## 2023-05-23 RX ORDER — VENLAFAXINE HYDROCHLORIDE 37.5 MG/1
37.5 CAPSULE, EXTENDED RELEASE ORAL DAILY
Qty: 30 CAPSULE | Refills: 3 | Status: SHIPPED | OUTPATIENT
Start: 2023-05-23 | End: 2023-05-23

## 2023-05-23 RX ORDER — VENLAFAXINE HYDROCHLORIDE 37.5 MG/1
37.5 CAPSULE, EXTENDED RELEASE ORAL DAILY
Qty: 90 CAPSULE | Refills: 0 | Status: SHIPPED | OUTPATIENT
Start: 2023-05-23 | End: 2023-08-16 | Stop reason: SDUPTHER

## 2023-05-23 ASSESSMENT — FIBROSIS 4 INDEX: FIB4 SCORE: 0.79

## 2023-05-23 NOTE — ASSESSMENT & PLAN NOTE
Her topiramate dose was increased at her last appointment.  She continues with topiramate 100 mg twice daily and rizatriptan 5 mg as needed.  She is taken over-the-counter Excedrin as needed.  She is having migraines less since the dose increase. Last migraine was last week. She did take Fioricet that helped and rizatriptan last week.

## 2023-05-23 NOTE — ASSESSMENT & PLAN NOTE
She tried the black cohash. She is smoking 1/2 pack or less a day. She has tried gabapentin after ankle surgery and did not like how it made her feel. She does not have a uterus. She has been on Elavail and this caused weight gain.

## 2023-05-23 NOTE — PROGRESS NOTES
Subjective:     CC: follow-up    HPI:   Alondra presents today with the following:    Hot flashes  She tried the black cohash. She is smoking 1/2 pack or less a day. She has tried gabapentin after ankle surgery and did not like how it made her feel. She does not have a uterus. She has been on Elavail and this caused weight gain.     Migraine with aura and without status migrainosus, not intractable  Her topiramate dose was increased at her last appointment.  She continues with topiramate 100 mg twice daily and rizatriptan 5 mg as needed.  She is taken over-the-counter Excedrin as needed.  She is having migraines less since the dose increase. Last migraine was last week. She did take Fioricet that helped and rizatriptan last week.    Past Medical History:   Diagnosis Date    Anxiety disorder     Anxiety disorder 3/11/2019    Hot flashes 6/2/2020    Migraine        Social History     Tobacco Use    Smoking status: Every Day     Packs/day: 0.50     Years: 20.00     Pack years: 10.00     Types: Cigarettes    Smokeless tobacco: Never    Tobacco comments:     under a pack a day   Vaping Use    Vaping Use: Never used   Substance Use Topics    Alcohol use: Yes     Comment: rarely    Drug use: No       Current Outpatient Medications Ordered in Epic   Medication Sig Dispense Refill    Cetirizine-Pseudoephedrine (ZYRTEC-D PO) 5/120 qd      venlafaxine XR (EFFEXOR XR) 37.5 MG CAPSULE SR 24 HR Take 1 Capsule by mouth every day. 90 Capsule 0    topiramate (TOPAMAX) 100 MG Tab Take 1 Tablet by mouth 2 times a day. 180 Tablet 1    albuterol 108 (90 Base) MCG/ACT Aero Soln inhalation aerosol Inhale 1-2 Puffs every four hours as needed for Shortness of Breath. 8.5 g 0    meclizine (ANTIVERT) 25 MG Tab Take 1 Tablet by mouth 3 times a day as needed for Dizziness. 30 Tablet 0    fluticasone (FLONASE) 50 MCG/ACT nasal spray Administer 1 Spray into affected nostril(S) every day.      guaiFENesin ER (MUCINEX) 600 MG TABLET SR 12 HR Take 600  "mg by mouth every 12 hours.      rizatriptan (MAXALT) 5 MG tablet Take 1 Tab by mouth Once PRN for Migraine for up to 1 dose. 10 Tab 0    asa/apap/caffeine (EXCEDRIN) 250-250-65 MG Tab Take 1 Tab by mouth as needed for Headache.       No current Epic-ordered facility-administered medications on file.       Allergies:  Iodine and Shellfish allergy    Health Maintenance: Reviewed.       Objective:     Vital signs reviewed  Exam:  /80 (BP Location: Left arm, Patient Position: Sitting, BP Cuff Size: Adult)   Pulse 82   Temp 36 °C (96.8 °F) (Temporal)   Ht 1.702 m (5' 7\")   Wt 74.4 kg (164 lb)   SpO2 99%   BMI 25.69 kg/m²  Body mass index is 25.69 kg/m².    Gen: Alert and oriented, No apparent distress.  Neck: Neck is supple, full ROM.  Lungs: Normal effort, no audible wheezes  CV: Skin pink, warm and dry.  Ext: No clubbing, cyanosis, edema.      Assessment & Plan:     43 y.o. female with the following -     1. Hot flashes  Chronic exacerbated problem.  She tried the black cohosh and this did not help.  She has been on gabapentin and Elavil in the past and did not tolerate side effects.  We discussed that with her continuing to smoke half a pack a day I starting estradiol this could increase her risk for heart attack and stroke.  We discussed starting with Venlafaxine and she is in agreement.  Start Venlafaxine 37.5 mg daily.   - venlafaxine XR (EFFEXOR XR) 37.5 MG CAPSULE SR 24 HR; Take 1 Capsule by mouth every day.  Dispense: 90 Capsule; Refill: 0    2. Migraine with aura and without status migrainosus, not intractable  Chronic stable problem.  Migraines have improved.  Continue topiramate 100 mg twice daily, rizatriptan 5 mg as needed and over-the-counter Excedrin as needed.    3. Screening for colorectal cancer  Acute uncomplicated problem.  Discussed screening and she like to proceed with Cologuard.  - COLOGUARD (FIT DNA)        Return in about 3 months (around 8/23/2023) for hot flashes .    Please note " that this dictation was created using voice recognition software. I have made every reasonable attempt to correct obvious errors, but I expect that there are errors of grammar and possibly content that I did not discover before finalizing the note.

## 2023-06-16 ENCOUNTER — PHARMACY VISIT (OUTPATIENT)
Dept: PHARMACY | Facility: MEDICAL CENTER | Age: 43
End: 2023-06-16
Payer: COMMERCIAL

## 2023-06-16 PROCEDURE — RXMED WILLOW AMBULATORY MEDICATION CHARGE: Performed by: NURSE PRACTITIONER

## 2023-07-20 ENCOUNTER — PATIENT MESSAGE (OUTPATIENT)
Dept: MEDICAL GROUP | Facility: PHYSICIAN GROUP | Age: 43
End: 2023-07-20
Payer: COMMERCIAL

## 2023-07-20 ENCOUNTER — PHARMACY VISIT (OUTPATIENT)
Dept: PHARMACY | Facility: MEDICAL CENTER | Age: 43
End: 2023-07-20
Payer: COMMERCIAL

## 2023-07-20 DIAGNOSIS — G43.109 MIGRAINE WITH AURA AND WITHOUT STATUS MIGRAINOSUS, NOT INTRACTABLE: ICD-10-CM

## 2023-07-20 PROCEDURE — RXMED WILLOW AMBULATORY MEDICATION CHARGE: Performed by: NURSE PRACTITIONER

## 2023-07-20 RX ORDER — BUTALBITAL, ACETAMINOPHEN AND CAFFEINE 50; 325; 40 MG/1; MG/1; MG/1
1 TABLET ORAL EVERY 6 HOURS PRN
Qty: 10 TABLET | Refills: 0 | Status: SHIPPED | OUTPATIENT
Start: 2023-07-20 | End: 2023-08-19

## 2023-07-20 NOTE — PROGRESS NOTES
Requested Prescriptions     Signed Prescriptions Disp Refills    butalbital/apap/caffeine (FIORICET) -40 mg Tab 10 Tablet 0     Sig: Take 1 Tablet by mouth every 6 hours as needed for Headache for up to 30 days.     Authorizing Provider: ERICA HANEY PMP reviewed     GALI Dan

## 2023-08-03 ENCOUNTER — OCCUPATIONAL MEDICINE (OUTPATIENT)
Dept: URGENT CARE | Facility: CLINIC | Age: 43
End: 2023-08-03
Payer: COMMERCIAL

## 2023-08-03 ENCOUNTER — APPOINTMENT (OUTPATIENT)
Dept: RADIOLOGY | Facility: IMAGING CENTER | Age: 43
End: 2023-08-03
Attending: STUDENT IN AN ORGANIZED HEALTH CARE EDUCATION/TRAINING PROGRAM
Payer: COMMERCIAL

## 2023-08-03 ENCOUNTER — NON-PROVIDER VISIT (OUTPATIENT)
Dept: URGENT CARE | Facility: CLINIC | Age: 43
End: 2023-08-03
Payer: COMMERCIAL

## 2023-08-03 VITALS
WEIGHT: 162.8 LBS | BODY MASS INDEX: 25.55 KG/M2 | HEIGHT: 67 IN | RESPIRATION RATE: 14 BRPM | TEMPERATURE: 97 F | DIASTOLIC BLOOD PRESSURE: 64 MMHG | OXYGEN SATURATION: 97 % | HEART RATE: 68 BPM | SYSTOLIC BLOOD PRESSURE: 102 MMHG

## 2023-08-03 DIAGNOSIS — S93.492A SPRAIN OF ANTERIOR TALOFIBULAR LIGAMENT OF LEFT ANKLE, INITIAL ENCOUNTER: ICD-10-CM
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
## 2023-08-03 DIAGNOSIS — M25.571 ACUTE RIGHT ANKLE PAIN: ICD-10-CM

## 2023-08-03 DIAGNOSIS — M25.572 ACUTE LEFT ANKLE PAIN: ICD-10-CM

## 2023-08-03 DIAGNOSIS — Z02.6 WORKER'S COMPENSATION CLAIM ADMINISTRATIVE PROBLEM: ICD-10-CM

## 2023-08-03 LAB
AMP AMPHETAMINE: NORMAL
BAR BARBITURATES: NORMAL
BREATH ALCOHOL COMMENT: NORMAL
BZO BENZODIAZEPINES: NORMAL
COC COCAINE: NORMAL
INT CON NEG: NORMAL
INT CON POS: NORMAL
MDMA ECSTASY: NORMAL
MET METHAMPHETAMINES: NORMAL
MTD METHADONE: NORMAL
OPI OPIATES: NORMAL
OXY OXYCODONE: NORMAL
PCP PHENCYCLIDINE: NORMAL
POC BREATHALIZER: 0 PERCENT (ref 0–0.01)
POC URINE DRUG SCREEN OCDRS: NEGATIVE
THC: NORMAL

## 2023-08-03 PROCEDURE — 3078F DIAST BP <80 MM HG: CPT | Performed by: STUDENT IN AN ORGANIZED HEALTH CARE EDUCATION/TRAINING PROGRAM

## 2023-08-03 PROCEDURE — 99215 OFFICE O/P EST HI 40 MIN: CPT | Performed by: STUDENT IN AN ORGANIZED HEALTH CARE EDUCATION/TRAINING PROGRAM

## 2023-08-03 PROCEDURE — 82075 ASSAY OF BREATH ETHANOL: CPT | Performed by: STUDENT IN AN ORGANIZED HEALTH CARE EDUCATION/TRAINING PROGRAM

## 2023-08-03 PROCEDURE — 73610 X-RAY EXAM OF ANKLE: CPT | Mod: TC,LT | Performed by: RADIOLOGY

## 2023-08-03 PROCEDURE — 3074F SYST BP LT 130 MM HG: CPT | Performed by: STUDENT IN AN ORGANIZED HEALTH CARE EDUCATION/TRAINING PROGRAM

## 2023-08-03 PROCEDURE — 73630 X-RAY EXAM OF FOOT: CPT | Mod: TC,LT | Performed by: RADIOLOGY

## 2023-08-03 PROCEDURE — 80305 DRUG TEST PRSMV DIR OPT OBS: CPT | Performed by: STUDENT IN AN ORGANIZED HEALTH CARE EDUCATION/TRAINING PROGRAM

## 2023-08-03 ASSESSMENT — FIBROSIS 4 INDEX: FIB4 SCORE: 0.79

## 2023-08-03 NOTE — PROGRESS NOTES
"Subjective:     Alondra Huerta is a 43 y.o. female who presents for Other (NEW  DOI: 8/2/23 (L) ankle, (R) thigh )      Today's date: 8/3/2023.  Visit #1.  Date of injury: 8/2/2023  Patient presents with a chief complaint of left ankle and foot pain.  Yesterday while walking she suffered a mechanical fall, twisting her left ankle falling to the ground, landing on her right hip.  Developed immediate bruising and swelling of her left ankle.  Says she has pain with ambulation and inversion.  Tylenol has not helped.  Ibuprofen has provided some relief.  She is not experiencing any groin pain and is not having any difficulty with weightbearing over right lower extremity.  She not develop any ecchymosis or swelling at site impact of her right thigh.  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  Denies history of recurrent ankle sprains.      .     PMH:   No pertinent past medical history to this problem  MEDS:  Medications were reviewed in EMR  ALLERGIES:  Allergies were reviewed in EMR  FH:   No pertinent family history to this problem       Objective:     /64 (BP Location: Left arm, Patient Position: Sitting, BP Cuff Size: Adult)   Pulse 68   Temp 36.1 °C (97 °F) (Temporal)   Resp 14   Ht 1.702 m (5' 7\")   Wt 73.8 kg (162 lb 12.8 oz)   SpO2 97%   BMI 25.50 kg/m²     Gen: no acute distress, normal voice  Skin: dry, intact, moist mucosal membranes  Head: Atraumatic, normocephalic  Psych: normal affect, normal judgement, alert, awake  Musculoskeletal: Right ankle: Mild edema and ecchymosis along the lateral malleolus.  Full range of motion in all planes associated with mild discernible discomfort with inversion.  TTP along the ATFL, CFL, navicular and middle one third of metatarsals 3 and 4 without any step-off or crepitus.  Achilles mechanism intact.  Positive anterior drawer test.       RADIOLOGY " RESULTS  DX-ANKLE 3+ VIEWS LEFT    Result Date: 8/3/2023  8/3/2023 2:00 PM HISTORY/REASON FOR EXAM:  Pain/Deformity Following Trauma; TTP lateral malleolus and navicular. TECHNIQUE/EXAM DESCRIPTION AND NUMBER OF VIEWS:  3 views of the LEFT ankle. COMPARISON: None. FINDINGS: There is normal bony mineralization of the left ankle. There are postoperative changes of the distal fibula. There is no evidence of fracture, dislocation. There is a small calcaneal spur.     No radiographic evidence of acute traumatic injury.    DX-FOOT-COMPLETE 3+ LEFT    Result Date: 8/3/2023  8/3/2023 2:00 PM HISTORY/REASON FOR EXAM:  Pain/Deformity Following Trauma; TTP middle 1/3 of metatarsals 2-3 and navicular TECHNIQUE/EXAM DESCRIPTION AND NUMBER OF VIEWS: 3 of the foot COMPARISON: None. FINDINGS: There is normal bony mineralization.  There is no evidence of fracture, dislocation, or osseous lesion.  There is no evidence of soft tissue injury.     1.  No radiographic evidence of acute injury.                  Assessment/Plan:       1. Acute left ankle pain  - DX-FOOT-COMPLETE 3+ LEFT; Future  - DX-ANKLE 3+ VIEWS LEFT; Future    2. Sprain of anterior talofibular ligament of left ankle, initial encounter    3. Work related injury  - DX-FOOT-COMPLETE 3+ LEFT; Future  - DX-ANKLE 3+ VIEWS LEFT; Future    Released to Restricted Duty FROM 8/3/2023 TO 8/10/2023  X-rays negative for any acute osseous abnormalities.  There was gross laxity when stressing the ATFL.  MRI in 2017 did demonstrate ATFL tear but patient reports she has had surgery on her left ankle but uncertain if this particular ligament was repaired.    -Placed in a tall walking boot  -Elevation and ice  -Ibuprofen/Tylenol for symptomatic relief  -Follow-up with occupational medicine at [address] [place] [address] in 1 week for reevaluation       42 minutes was spent on this encounter reviewing previous medical records, including face-to-face time, discussing the diagnosis, medical management,  follow-up and charting. This does not include time spent on separately billable procedures/tests.    Differential diagnosis, natural history, supportive care, and indications for immediate follow-up discussed.

## 2023-08-03 NOTE — LETTER
"EMPLOYEE’S CLAIM FOR COMPENSATION/ REPORT OF INITIAL TREATMENT  FORM C-4  PLEASE TYPE OR PRINT    EMPLOYEE’S CLAIM - PROVIDE ALL INFORMATION REQUESTED   First Name  Alondra Last Name  Deanna Birthdate                    1980                Sex  female Claim Number (Insurer’s Use Only)   Home Address  [address] Age  43 y.o. 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 (if applicable)  Walking    How did this injury or occupational disease occur? (Be specific and answer in detail. Use additional sheet if necessary)  I was walking in the ER and turned the corner and my ankle hoisted and I stumbled forward and landed on my left thigh/hip & left elbow   If you believe that you have an occupational disease, when did you first have knowledge of the disability and its relationship to your employment?  n/a Witnesses to the Accident (if applicable)  A ER tech came out to make sure I was ok but I don't Know...      Nature of Injury or Occupational Disease  Workers' Compensation  Part(s) of Body Injured or Affected  Ankle (L), Hip (R), Elbow (R)    I CERTIFY THAT THE " ABOVE IS TRUE AND CORRECT TO T HE BEST OF MY KNOWLEDGE AND THAT I HAVE PROVIDED THIS INFORMATION IN ORDER TO OBTAIN THE BENEFITS OF [place], OR [place]).  I HEREBY AUTHORIZE ANY PHYSICIAN, CHIROPRACTOR, SURGEON, PRACTITIONER OR ANY OTHER PERSON, ANY HOSPITAL, INCLUDING [facility] OR [facility], ANY  MEDICAL SERVICE ORGANIZATION, ANY INSURANCE COMPANY, OR OTHER INSTITUTION OR ORGANIZATION TO RELEASE TO EACH OTHER, ANY MEDICAL OR OTHER INFORMATION, INCLUDING BENEFITS PAID OR PAYABLE, PERTINENT TO THIS INJURY OR DISEASE, EXCEPT INFORMATION RELATIVE TO DIAGNOSIS, TREATMENT AND/OR COUNSELING FOR AIDS, PSYCHOLOGICAL CONDITIONS, ALCOHOL OR CONTROLLED SUBSTANCES, FOR WHICH I MUST GIVE SPECIFIC AUTHORIZATION.  A PHOTOSTAT OF THIS AUTHORIZATION SHALL BE VALID AS THE ORIGINAL.       Date   Place Employee’s Original or  *Electronic Signature   THIS REPORT MUST BE COMPLETED AND MAILED WITHIN 3 WORKING DAYS OF TREATMENT   Place  [facility]  Name of Facility  [facility]   Date  8/3/2023 Diagnosis and Description of Injury or Occupational Disease  (M25.572) Acute left ankle pain  (S93.492A) Sprain of anterior talofibular ligament of left ankle, initial encounter  (Y99.0) Work related injury Is there evidence that the injured employee was under the influence of alcohol and/or another controlled substance at the time of accident?  ? No ? Yes (if yes, please explain)   Hour  1:28 PM   Diagnoses of Acute left ankle pain, Sprain of anterior talofibular ligament of left ankle, initial encounter, and Work related injury were pertinent to this visit. No   Treatment  X-rays negative for any acute osseous abnormalities.  There was gross laxity when stressing the ATFL.  MRI in 2017 did demonstrate ATFL tear but patient reports she has had surgery on her left ankle but uncertain if this particular ligament was  repaired.    -Placed in a tall walking boot  -Elevation and ice  -Ibuprofen/Tylenol for symptomatic relief  -Follow-up with occupational medicine at [address] [place] [address] in 1 week for reevaluation    Have you advised the patient to remain off work five days or     more?    X-Ray Findings      ? Yes Indicate dates:   From   To      From information given by the employee, together with medical evidence, can        you directly connect this injury or occupational disease as job incurred?  Yes ? No If no, is the injured employee capable of:  ? full duty  No ? modified duty  Yes   Is additional medical care by a physician indicated?  Yes If modified duty, specify any limitations / restrictions  Needs to wear a boot while at work   Do you know of any previous injury or disease contributing to this condition or occupational disease?  ? Yes ? No (Explain if yes)                          Yes  Comments:Previous history of ligamentous injuries   Date  8/3/2023 Print Health Care Provider's  Name  Negro Hinojosa D.O. I certify that the employer’s copy of  this form was delivered to the employer on:   Address  [address] [place] [address] Insurer’s Use Only     [facility]  [address]    Provider’s Tax ID Number  [id number] Telephone  Dept: [phone]             Health Care Provider’s Original or Electronic Signature  e-NEGRO Treviño.MIGUELINA Degree (MD,DO, DC,PA-C,APRN)  DO      * Complete and attach Release of Information (Form C-4A) when injured employee signs C-4 Form electronically  ORIGINAL - TREATING HEALTHCARE PROVIDER PAGE 2 - INSURER/TPA PAGE 3 - EMPLOYER PAGE 4 - EMPLOYEE             Form C-4 (rev.08/21)           BRIEF DESCRIPTION OF RIGHTS AND BENEFITS  (Pursuant to [place] 616C.050)    Notice of Injury or Occupational Disease (Incident Report Form C-1): If an injury or occupational disease (OD) arises out of and in the course of employment, you must provide written notice to your employer as soon as practicable,  "but no later than 7 days after the accident or OD. Your employer shall maintain a sufficient supply of the required forms.    Claim for Compensation (Form C-4): If medical treatment is sought, the form C-4 is available at the place of initial treatment. A completed \"Claim for Compensation\" (Form C-4) must be filed within 90 days after an accident or OD. The treating physician or chiropractor must, within 3 working days after treatment, complete and mail to the employer, the employer's insurer and third-party , the Claim for Compensation.    Medical Treatment: If you require medical treatment for your on-the-job injury or OD, you may be required to select a physician or chiropractor from a list provided by your workers’ compensation insurer, if it has contracted with an Organization for Managed Care (MCO) or Preferred Provider Organization (PPO) or providers of health care. If your employer has not entered into a contract with an MCO or PPO, you may select a physician or chiropractor from the Panel of Physicians and Chiropractors. Any medical costs related to your industrial injury or OD will be paid by your insurer.    Temporary Total Disability (TTD): If your doctor has certified that you are unable to work for a period of at least 5 consecutive days, or 5 cumulative days in a 20-day period, or places restrictions on you that your employer does not accommodate, you may be entitled to TTD compensation.    Temporary Partial Disability (TPD): If the wage you receive upon reemployment is less than the compensation for TTD to which you are entitled, the insurer may be required to pay you TPD compensation to make up the difference. TPD can only be paid for a maximum of 24 months.    Permanent Partial Disability (PPD): When your medical condition is stable and there is an indication of a PPD as a result of your injury or OD, within 30 days, your insurer must arrange for an evaluation by a rating physician or " chiropractor to determine the degree of your PPD. The amount of your PPD award depends on the date of injury, the results of the PPD evaluation, your age and wage.    Permanent Total Disability (PTD): If you are medically certified by a treating physician or chiropractor as permanently and totally disabled and have been granted a PTD status by your insurer, you are entitled to receive monthly benefits not to exceed 66 2/3% of your average monthly wage. The amount of your PTD payments is subject to reduction if you previously received a lump-sum PPD award.    Vocational Rehabilitation Services: You may be eligible for vocational rehabilitation services if you are unable to return to the job due to a permanent physical impairment or permanent restrictions as a result of your injury or occupational disease.    Transportation and Per Bereket Reimbursement: You may be eligible for travel expenses and per bereket associated with medical treatment.    Reopening: You may be able to reopen your claim if your condition worsens after claim closure.     Appeal Process: If you disagree with a written determination issued by the insurer or the insurer does not respond to your request, you may appeal to the Department of Administration, , by following the instructions contained in your determination letter. You must appeal the determination within 70 days from the date of the determination letter at [address], [address], [place], [place] [address], or [address] [facility] [address], [place] [address]. If you disagree with the  decision, you may appeal to the Department of Administration, . You must file your appeal within 30 days from the date of the  decision letter at [address], [address], [place], [place] [address], or [address] [facility] [facility] [address] [facility] [address], [place], [place] [address]. If you disagree with a decision of an , you may file a  petition for judicial review with the District Court. You must do so within 30 days of the Appeal Officer’s decision. You may be represented by an  at your own expense or you may contact the [facility] for possible representation.    [place]  for Injured Workers (NAIW): If you disagree with a  decision, you may request that NAIW represent you without charge at an  Hearing. For information regarding denial of benefits, you may contact the [facility] at: [address], [address], [place] [address], [phone], or [address] [facility], [address], [place] [address], [phone]    To File a Complaint with the Division: If you wish to file a complaint with the  of the Division of Industrial Relations (DIR),  please contact the Workers’ Compensation Section, [address] [facility] [facility] [address], [place], [place] [address], telephone [phone]) [phone], or [address], [address], [place], [place] [address], telephone [phone].    For assistance with Workers’ Compensation Issues: You may contact the [facility] Office for Consumer Health Assistance, [address], [address], [place], [place] [address], Toll Free 1-126.501.9680, Web site: http://[facility].[place].gov/Programs/AARON E-mail: [email or web address]@[facility].[email or web address].[facility]              __________________________________________________________________                                    _________________            Employee Name / Signature                                                                                                                            Date                                                                                                                                                                                                                              D-2 (rev. 10/20)

## 2023-08-03 NOTE — LETTER
[facility] [place] [address] [facility] [address] [place] [address]  Phone:  [phone] - Fax:  [phone]   [facility] Progress Report and Disability Certification  Date of Service: 8/3/2023   No Show:  No  Date / Time of Next Visit: 8/10/2023 w/OCC MED   Claim Information   Patient Name: Alondra Huerta  Claim Number:     Employer: [employer]  Date of Injury: 8/2/2023     Insurer / TPA: [employer]  ID / SSN:     Occupation: Med Rech Tech (Pharmacy)  Diagnosis: Diagnoses of Acute left ankle pain, Sprain of anterior talofibular ligament of left ankle, initial encounter, and Work related injury were pertinent to this visit.    Medical Information   Related to Industrial Injury? Yes    Subjective Complaints:  Today's date: 8/3/2023.  Visit #1.  Date of injury: 8/2/2023  Patient presents with a chief complaint of left ankle and foot pain.  Yesterday while walking she suffered a mechanical fall, twisting her left ankle falling to the ground, landing on her right hip.  Developed immediate bruising and swelling of her left ankle.  Says she has pain with ambulation and inversion.  Tylenol has not helped.  Ibuprofen has provided some relief.  She is not experiencing any groin pain and is not having any difficulty with weightbearing over right lower extremity.  She not develop any ecchymosis or swelling at site impact of her right thigh.  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  Denies history of recurrent ankle sprains.      .    Objective Findings: Gen: no acute distress, normal voice  Skin: dry, intact, moist mucosal membranes  Head: Atraumatic, normocephalic  Psych: normal affect, normal judgement, alert, awake  Musculoskeletal: Right ankle: Mild edema and ecchymosis along the lateral malleolus.  Full range of motion in all planes associated with mild discernible discomfort with inversion.   TTP along the ATFL, CFL, navicular and middle one third of metatarsals 3 and 4 without any step-off or crepitus.  Achilles mechanism intact.  Positive anterior drawer test.       RADIOLOGY RESULTS  DX-ANKLE 3+ VIEWS LEFT    Result Date: 8/3/2023  8/3/2023 2:00 PM HISTORY/REASON FOR EXAM:  Pain/Deformity Following Trauma; TTP lateral malleolus and navicular. TECHNIQUE/EXAM DESCRIPTION AND NUMBER OF VIEWS:  3 views of the LEFT ankle. COMPARISON: None. FINDINGS: There is normal bony mineralization of the left ankle. There are postoperative changes of the distal fibula. There is no evidence of fracture, dislocation. There is a small calcaneal spur.     No radiographic evidence of acute traumatic injury.    DX-FOOT-COMPLETE 3+ LEFT    Result Date: 8/3/2023  8/3/2023 2:00 PM HISTORY/REASON FOR EXAM:  Pain/Deformity Following Trauma; TTP middle 1/3 of metatarsals 2-3 and navicular TECHNIQUE/EXAM DESCRIPTION AND NUMBER OF VIEWS: 3 of the foot COMPARISON: None. FINDINGS: There is normal bony mineralization.  There is no evidence of fracture, dislocation, or osseous lesion.  There is no evidence of soft tissue injury.     1.  No radiographic evidence of acute injury.                 Pre-Existing Condition(s):     Assessment:   Initial Visit    Status: Additional Care Required  Permanent Disability:No    Plan:      Diagnostics:      Comments:       Disability Information   Status: Released to Restricted Duty    From:  8/3/2023  Through: 8/10/2023 Restrictions are: Temporary  Comments:Needs to wear a boot while at work   Physical Restrictions   Sitting:    Standing:    Stooping:    Bending:      Squatting:    Walking:    Climbing:    Pushing:      Pulling:    Other:    Reaching Above Shoulder (L):   Reaching Above Shoulder (R):       Reaching Below Shoulder (L):    Reaching Below Shoulder (R):      Not to exceed Weight Limits   Carrying(hrs):   Weight Limit(lb):   Lifting(hrs):   Weight  Limit(lb):     Comments: X-rays  negative for any acute osseous abnormalities.  There was gross laxity when stressing the ATFL.  MRI in 2017 did demonstrate ATFL tear but patient reports she has had surgery on her left ankle but uncertain if this particular ligament was repaired.    -Placed in a tall walking boot  -Elevation and ice  -Ibuprofen/Tylenol for symptomatic relief  -Follow-up with occupational medicine at [address] [place] [address] in 1 week for reevaluation    Repetitive Actions   Hands: i.e. Fine Manipulations from Grasping:     Feet: i.e. Operating Foot Controls:     Driving / Operate Machinery:     Health Care Provider’s Original or Electronic Signature  Negro Hinojosa D.O. Health Care Provider’s Original or Electronic Signature    Talib Smith DO MPH     Clinic Name / Location: [address]  [place] [address] Clinic Phone Number: Dept: [phone]   Appointment Time: 12:15 Pm Visit Start Time: 1:28 PM   Check-In Time:  12:33 Pm Visit Discharge Time:  2:55 PM    Original-Treating Physician or Chiropractor    Page 2-Insurer/TPA    Page 3-Employer    Page 4-Employee

## 2023-08-15 ENCOUNTER — OCCUPATIONAL MEDICINE (OUTPATIENT)
Dept: OCCUPATIONAL MEDICINE | Facility: CLINIC | Age: 43
End: 2023-08-15
Payer: COMMERCIAL

## 2023-08-15 VITALS
WEIGHT: 162 LBS | OXYGEN SATURATION: 98 % | RESPIRATION RATE: 14 BRPM | HEART RATE: 84 BPM | DIASTOLIC BLOOD PRESSURE: 78 MMHG | SYSTOLIC BLOOD PRESSURE: 122 MMHG | HEIGHT: 67 IN | TEMPERATURE: 96.4 F | BODY MASS INDEX: 25.43 KG/M2

## 2023-08-15 DIAGNOSIS — S93.492D SPRAIN OF ANTERIOR TALOFIBULAR LIGAMENT OF LEFT ANKLE, SUBSEQUENT ENCOUNTER: ICD-10-CM

## 2023-08-15 PROCEDURE — 3074F SYST BP LT 130 MM HG: CPT | Performed by: NURSE PRACTITIONER

## 2023-08-15 PROCEDURE — 99213 OFFICE O/P EST LOW 20 MIN: CPT | Performed by: NURSE PRACTITIONER

## 2023-08-15 PROCEDURE — 3078F DIAST BP <80 MM HG: CPT | Performed by: NURSE PRACTITIONER

## 2023-08-15 ASSESSMENT — FIBROSIS 4 INDEX: FIB4 SCORE: 0.79

## 2023-08-15 NOTE — PROGRESS NOTES
"Subjective:     Alondra Huerta is a 43 y.o. female who presents for Other (WC DOI 8/2/23 rt ankle injury, feeling better room 17)      DOI: 8/2/2023. BIANKA:  While walking she suffered a mechanical fall, twisting her left ankle falling to the ground, landing on her right hip.  Patient seen in urgent care x1.  Today she states that ankle pain is minimally improved.  There is a constant, swelling, deep aching burning sensation, and pain with ambulating  and range of motion.  She denies numbness and tingling.  She states that the hip is sore and the bruising is present.  Otherwise she denies catching, popping, or clicking of the hip.  She is taking ibuprofen, icing, and elevating for her symptoms.  She is also doing warm Epsom salt soaks which seem to be helping.  She has been using the walking boot with minimal difficulty.  She has been tolerating work with minimal difficulty.  MRI and orthopedic referral placed for further evaluation and management.  Plan of care discussed with patient.    ROS: All systems were reviewed on intake form, form was reviewed and signed. See scanned documents in media. Pertinent positives and negatives included in HPI.    PMH: No pertinent past medical history to this problem  MEDS: Medications were reviewed in Epic  ALLERGIES:   Allergies   Allergen Reactions    Iodine Anaphylaxis and Shortness of Breath     IV contrast     Shellfish Allergy Anaphylaxis     SOCHX: Works as [employer] at [employer]  FH: No pertinent family history to this problem       Objective:     /78 (BP Location: Left arm, Patient Position: Sitting, BP Cuff Size: Adult)   Pulse 84   Temp (!) 35.8 °C (96.4 °F)   Resp 14   Ht 1.702 m (5' 7\")   Wt 73.5 kg (162 lb)   SpO2 98%   BMI 25.37 kg/m²     [unfilled]    Right ankle: Mild edema and ecchymosis along the lateral malleolus.  Full range of motion in all planes associated with mild discernible discomfort with inversion.  TTP along the ATFL, CFL, navicular " and middle one third of metatarsals 3 and 4 without any step-off or crepitus.  Achilles mechanism intact.  Positive anterior drawer test with gross laxity.  Right hip: No gross deformity noted.  There is approximately a softball size ecchymosis noted.  Full range of motion.  Negative clicking, popping, or catching.  No step-offs.  Antalgic gait, secondary to walking boot    Assessment/Plan:       1. Sprain of anterior talofibular ligament of left ankle, subsequent encounter  - MR-ANKLE W/O LEFT; Future  - Referral to Radiology  - Referral to Orthopedics    Released to Restricted Duty FROM 8/15/2023 TO 9/7/2023  Needs to wear a boot while at work  Follow-up in 3 weeks, unless seen by orthopedics  Restricted duty, per orthopedics  Orthopedics referral placed, transfer care  MRI ordered  Recommend continue with walking boot while at work, breaks at home as needed for comfort  Recommend continue with ice, elevation, and gentle range of motion and stretching as tolerated  Continue with OTC Tylenol/ibuprofen and Epsom salt soaks    Differential diagnosis, natural history, supportive care, and indications for immediate follow-up discussed.    Approximately 25 minutes were spent in reviewing notes, preparing for visit, obtaining history, exam and evaluation, patient counseling/education and post visit documentation/orders.

## 2023-08-15 NOTE — LETTER
[address],   [address] [place] [address]  Phone:  [phone] - Fax:  [phone]   [facility] Progress Report and Disability Certification  Date of Service: 8/15/2023   No Show:  No  Date / Time of Next Visit: 9/7/2023 @ 3:00 pm   Claim Information   Patient Name: Alondra Huerta  Claim Number:     Employer: [employer]  Date of Injury: 8/2/2023     Insurer / TPA: Esis  ID / SSN:     Occupation: Med Rech Tech (Pharmacy)  Diagnosis: The encounter diagnosis was Sprain of anterior talofibular ligament of left ankle, subsequent encounter.    Medical Information   Related to Industrial Injury? Yes    Subjective Complaints:  DOI: 8/2/2023. BIANKA:  While walking she suffered a mechanical fall, twisting her left ankle falling to the ground, landing on her right hip.  Patient seen in urgent care x1.  Today she states that ankle pain is minimally improved.  There is a constant, swelling, deep aching burning sensation, and pain with ambulating  and range of motion.  She denies numbness and tingling.  She states that the hip is sore and the bruising is present.  Otherwise she denies catching, popping, or clicking of the hip.  She is taking ibuprofen, icing, and elevating for her symptoms.  She is also doing warm Epsom salt soaks which seem to be helping.  She has been using the walking boot with minimal difficulty.  She has been tolerating work with minimal difficulty.  MRI and orthopedic referral placed for further evaluation and management.  Plan of care discussed with patient.   Objective Findings: Right ankle: Mild edema and ecchymosis along the lateral malleolus.  Full range of motion in all planes associated with mild discernible discomfort with inversion.  TTP along the ATFL, CFL, navicular and middle one third of metatarsals 3 and 4 without any step-off or crepitus.  Achilles mechanism intact.  Positive anterior drawer test with gross  laxity.  Right hip: No gross deformity noted.  There is approximately a softball size ecchymosis noted.  Full range of motion.  Negative clicking, popping, or catching.  No step-offs.  Antalgic gait, secondary to walking boot   Pre-Existing Condition(s):     Assessment:   Condition Same    Status: Discharged / Care Transfer  Permanent Disability:No    Plan: Transfer CareDiagnostics    Diagnostics: MRI    Comments:  Follow-up in 3 weeks, unless seen by orthopedics  Restricted duty, per orthopedics  Orthopedics referral placed, transfer care  MRI ordered  Recommend continue with walking boot while at work, breaks at home as needed for comfort  Recommend continue with ice, elevation, and gentle range of motion and stretching as tolerated  Continue with OTC Tylenol/ibuprofen and Epsom salt soaks    Disability Information   Status: Released to Restricted Duty    From:  8/15/2023  Through: 9/7/2023 Restrictions are: Temporary   Physical Restrictions   Sitting:    Standing:    Stooping:    Bending:      Squatting:    Walking:    Climbing:    Pushing:      Pulling:    Other:    Comments:Needs to wear a boot while at work Reaching Above Shoulder (L):   Reaching Above Shoulder (R):       Reaching Below Shoulder (L):    Reaching Below Shoulder (R):      Not to exceed Weight Limits   Carrying(hrs):   Weight Limit(lb):   Lifting(hrs):   Weight  Limit(lb):     Comments: Needs to wear a boot while at work    Repetitive Actions   Hands: i.e. Fine Manipulations from Grasping:     Feet: i.e. Operating Foot Controls:     Driving / Operate Machinery:     Health Care Provider’s Original or Electronic Signature  GALI Perales Health Care Provider’s Original or Electronic Signature    Talib Smith DO MPH     Clinic Name / Location: [address],   [address]  [place] [address] Clinic Phone Number: Dept: [phone]   Appointment Time: 3:30 Pm Visit Start Time: 3:21 PM   Check-In Time:  3:14  Pm Visit Discharge Time:  3:50 pm   Original-Treating Physician or Chiropractor    Page 2-Insurer/TPA    Page 3-Employer    Page 4-Employee

## 2023-08-16 DIAGNOSIS — R23.2 HOT FLASHES: ICD-10-CM

## 2023-08-16 PROCEDURE — RXMED WILLOW AMBULATORY MEDICATION CHARGE: Performed by: NURSE PRACTITIONER

## 2023-08-16 RX ORDER — VENLAFAXINE HYDROCHLORIDE 37.5 MG/1
37.5 CAPSULE, EXTENDED RELEASE ORAL DAILY
Qty: 90 CAPSULE | Refills: 0 | Status: SHIPPED | OUTPATIENT
Start: 2023-08-16 | End: 2023-11-15 | Stop reason: SDUPTHER

## 2023-08-16 NOTE — TELEPHONE ENCOUNTER
Mary Received request via: Pharmacy    Was the patient seen in the last year in this department? Yes    Does the patient have an active prescription (recently filled or refills available) for medication(s) requested? No    Does the patient have [facility] Plus and need 100 day supply (blood pressure, diabetes and cholesterol meds only)? Patient does not have SCP

## 2023-08-17 NOTE — TELEPHONE ENCOUNTER
Requested Prescriptions     Signed Prescriptions Disp Refills    venlafaxine XR (EFFEXOR XR) 37.5 MG CAPSULE SR 24 HR 90 Capsule 0     Sig: Take 1 Capsule by mouth every day.     Authorizing Provider: ERICA HANEY A.P.R.N.

## 2023-08-23 ENCOUNTER — PHARMACY VISIT (OUTPATIENT)
Dept: PHARMACY | Facility: MEDICAL CENTER | Age: 43
End: 2023-08-23
Payer: COMMERCIAL

## 2023-09-18 DIAGNOSIS — G43.109 MIGRAINE WITH AURA AND WITHOUT STATUS MIGRAINOSUS, NOT INTRACTABLE: ICD-10-CM

## 2023-09-19 PROCEDURE — RXMED WILLOW AMBULATORY MEDICATION CHARGE: Performed by: NURSE PRACTITIONER

## 2023-09-19 RX ORDER — TOPIRAMATE 100 MG/1
100 TABLET, FILM COATED ORAL 2 TIMES DAILY
Qty: 180 TABLET | Refills: 2 | Status: SHIPPED | OUTPATIENT
Start: 2023-09-19

## 2023-09-19 NOTE — TELEPHONE ENCOUNTER
Requested Prescriptions     Signed Prescriptions Disp Refills    topiramate (TOPAMAX) 100 MG Tab 180 Tablet 2     Sig: Take 1 Tablet by mouth 2 times a day.     Authorizing Provider: ERICA HANEY A.P.R.N.

## 2023-09-20 ENCOUNTER — HOSPITAL ENCOUNTER (OUTPATIENT)
Dept: RADIOLOGY | Facility: MEDICAL CENTER | Age: 43
End: 2023-09-20
Attending: NURSE PRACTITIONER
Payer: COMMERCIAL

## 2023-09-20 ENCOUNTER — PHARMACY VISIT (OUTPATIENT)
Dept: PHARMACY | Facility: MEDICAL CENTER | Age: 43
End: 2023-09-20
Payer: COMMERCIAL

## 2023-09-20 DIAGNOSIS — S93.492D SPRAIN OF ANTERIOR TALOFIBULAR LIGAMENT OF LEFT ANKLE, SUBSEQUENT ENCOUNTER: ICD-10-CM

## 2023-09-20 PROCEDURE — 73721 MRI JNT OF LWR EXTRE W/O DYE: CPT | Mod: LT

## 2023-09-29 ENCOUNTER — IMMUNIZATION (OUTPATIENT)
Dept: OCCUPATIONAL MEDICINE | Facility: CLINIC | Age: 43
End: 2023-09-29

## 2023-09-29 DIAGNOSIS — Z23 NEED FOR VACCINATION: Primary | ICD-10-CM

## 2023-09-29 PROCEDURE — 90686 IIV4 VACC NO PRSV 0.5 ML IM: CPT | Performed by: PREVENTIVE MEDICINE

## 2023-10-10 ENCOUNTER — OCCUPATIONAL MEDICINE (OUTPATIENT)
Dept: OCCUPATIONAL MEDICINE | Facility: CLINIC | Age: 43
End: 2023-10-10
Payer: COMMERCIAL

## 2023-10-10 VITALS
OXYGEN SATURATION: 99 % | HEIGHT: 67 IN | WEIGHT: 162 LBS | BODY MASS INDEX: 25.43 KG/M2 | DIASTOLIC BLOOD PRESSURE: 84 MMHG | HEART RATE: 107 BPM | SYSTOLIC BLOOD PRESSURE: 122 MMHG | TEMPERATURE: 98.4 F | RESPIRATION RATE: 14 BRPM

## 2023-10-10 DIAGNOSIS — S93.492D SPRAIN OF ANTERIOR TALOFIBULAR LIGAMENT OF LEFT ANKLE, SUBSEQUENT ENCOUNTER: ICD-10-CM

## 2023-10-10 PROCEDURE — 99213 OFFICE O/P EST LOW 20 MIN: CPT | Performed by: NURSE PRACTITIONER

## 2023-10-10 PROCEDURE — 3079F DIAST BP 80-89 MM HG: CPT | Performed by: NURSE PRACTITIONER

## 2023-10-10 PROCEDURE — 3074F SYST BP LT 130 MM HG: CPT | Performed by: NURSE PRACTITIONER

## 2023-10-10 ASSESSMENT — FIBROSIS 4 INDEX: FIB4 SCORE: 0.79

## 2023-10-10 NOTE — PROGRESS NOTES
"Subjective:     Alondra Huerta is a 43 y.o. female who presents for Other (Same - rm 18/)      DOI: 8/2/2023. BIANKA:  While walking she suffered a mechanical fall, twisting her left ankle falling to the ground, landing on her right hip.  Today she states that ankle pain has been improved.  She states she has intermittent pain and swelling. She has been able to ambulate with minimal difficulty.   She denies numbness and tingling.  She is taking ibuprofen, icing, and elevating for her symptoms only as needed.  She is also doing warm Epsom salt soaks which seem to be helping.  She is weaned the walking boot with minimal difficulty.  She has been tolerating full duty with minimal difficulty.  She would like to try some physical therapy.  Plan of care discussed with patient.    ROS: All systems were reviewed on intake form, form was reviewed and signed. See scanned documents in media. Pertinent positives and negatives included in HPI.    PMH: No pertinent past medical history to this problem  MEDS: Medications were reviewed in Epic  ALLERGIES:   Allergies   Allergen Reactions    Iodine Anaphylaxis and Shortness of Breath     IV contrast     Shellfish Allergy Anaphylaxis     SOCHX: Works as [employer] at [employer]  FH: No pertinent family history to this problem       Objective:     /84   Pulse (!) 107   Temp 36.9 °C (98.4 °F) (Temporal)   Resp 14   Ht 1.702 m (5' 7\")   Wt 73.5 kg (162 lb)   SpO2 99%   BMI 25.37 kg/m²     [unfilled]    Right ankle: Mild edema and ecchymosis along the lateral malleolus.  FROM with mild discernible discomfort with inversion.  Mild TTP along the ATFL, CFL, navicular and middle one third of metatarsals 3 and 4 without any step-off or crepitus.  Achilles mechanism intact.  Positive anterior drawer test with gross laxity.    Assessment/Plan:       1. Sprain of anterior talofibular ligament of left ankle, subsequent encounter  - Referral to Physical Therapy    Released to Full " Duty FROM 10/10/2023 TO 11/10/2023     Follow-up in 4 weeks, unless seen by orthopedics  Full duty   Physical therapy referral placed  Recommend continue with ankle splint only as needed   Recommend continue with ice, elevation, and gentle range of motion and stretching as tolerated  Continue with OTC Tylenol/ibuprofen and Epsom salt soaks    Differential diagnosis, natural history, supportive care, and indications for immediate follow-up discussed.    Approximately 25 minutes were spent in reviewing notes, preparing for visit, obtaining history, exam and evaluation, patient counseling/education and post visit documentation/orders.

## 2023-10-10 NOTE — LETTER
[address],   [address] [place] [address]  Phone:  [phone] - Fax:  [phone]   [facility] Progress Report and Disability Certification  Date of Service: 10/10/2023   No Show:  No  Date / Time of Next Visit: 11/10/2023 @ 1:00pm   Claim Information   Patient Name: Alondra Huerta  Claim Number:     Employer: [employer]  Date of Injury: 8/2/2023     Insurer / TPA: Esis  ID / SSN:     Occupation: Med Rech Tech (Pharmacy)  Diagnosis: The encounter diagnosis was Sprain of anterior talofibular ligament of left ankle, subsequent encounter.    Medical Information   Related to Industrial Injury? Yes    Subjective Complaints:  DOI: 8/2/2023. BIANKA:  While walking she suffered a mechanical fall, twisting her left ankle falling to the ground, landing on her right hip.  Today she states that ankle pain has been improved.  She states she has intermittent pain and swelling. She has been able to ambulate with minimal difficulty.   She denies numbness and tingling.  She is taking ibuprofen, icing, and elevating for her symptoms only as needed.  She is also doing warm Epsom salt soaks which seem to be helping.  She is weaned the walking boot with minimal difficulty.  She has been tolerating full duty with minimal difficulty.  She would like to try some physical therapy.  Plan of care discussed with patient.   Objective Findings: Right ankle: Mild edema and ecchymosis along the lateral malleolus.  FROM with mild discernible discomfort with inversion.  Mild TTP along the ATFL, CFL, navicular and middle one third of metatarsals 3 and 4 without any step-off or crepitus.  Achilles mechanism intact.  Positive anterior drawer test with gross laxity.   Pre-Existing Condition(s):     Assessment:   Condition Same    Status: Additional Care Required  Permanent Disability:No    Plan: PT    Diagnostics:      Comments:  Follow-up in 4 weeks, unless seen by  orthopedics  Full duty   Physical therapy referral placed  Recommend continue with ankle splint only as needed   Recommend continue with ice, elevation, and gentle range of motion and stretching as tolerated  Continue with OTC Tylenol/ibuprofen and Epsom salt soaks    Disability Information   Status: Released to Full Duty    From:  10/10/2023  Through: 11/10/2023 Restrictions are:     Physical Restrictions   Sitting:    Standing:    Stooping:    Bending:      Squatting:    Walking:    Climbing:    Pushing:      Pulling:    Other:    Reaching Above Shoulder (L):   Reaching Above Shoulder (R):       Reaching Below Shoulder (L):    Reaching Below Shoulder (R):      Not to exceed Weight Limits   Carrying(hrs):   Weight Limit(lb):   Lifting(hrs):   Weight  Limit(lb):     Comments:      Repetitive Actions   Hands: i.e. Fine Manipulations from Grasping:     Feet: i.e. Operating Foot Controls:     Driving / Operate Machinery:     Health Care Provider’s Original or Electronic Signature  GALI Perales Health Care Provider’s Original or Electronic Signature    Talib Smith DO MPH     Clinic Name / Location: [address] [address] Clinic Phone Number: Dept: [phone]   Appointment Time: 2:30 Pm Visit Start Time: 2:18 PM   Check-In Time:  2:14 Pm Visit Discharge Time:  3:11 pm   Original-Treating Physician or Chiropractor    Page 2-Insurer/TPA    Page 3-Employer    Page 4-Employee

## 2023-11-10 ENCOUNTER — OCCUPATIONAL MEDICINE (OUTPATIENT)
Dept: OCCUPATIONAL MEDICINE | Facility: CLINIC | Age: 43
End: 2023-11-10
Payer: COMMERCIAL

## 2023-11-10 VITALS
HEIGHT: 67 IN | BODY MASS INDEX: 25.43 KG/M2 | DIASTOLIC BLOOD PRESSURE: 78 MMHG | WEIGHT: 162 LBS | SYSTOLIC BLOOD PRESSURE: 122 MMHG | TEMPERATURE: 98 F | HEART RATE: 88 BPM | OXYGEN SATURATION: 99 % | RESPIRATION RATE: 18 BRPM

## 2023-11-10 DIAGNOSIS — S93.492D SPRAIN OF ANTERIOR TALOFIBULAR LIGAMENT OF LEFT ANKLE, SUBSEQUENT ENCOUNTER: ICD-10-CM

## 2023-11-10 PROCEDURE — 99213 OFFICE O/P EST LOW 20 MIN: CPT | Performed by: NURSE PRACTITIONER

## 2023-11-10 PROCEDURE — 3078F DIAST BP <80 MM HG: CPT | Performed by: NURSE PRACTITIONER

## 2023-11-10 PROCEDURE — 3074F SYST BP LT 130 MM HG: CPT | Performed by: NURSE PRACTITIONER

## 2023-11-10 ASSESSMENT — ENCOUNTER SYMPTOMS
MYALGIAS: 1
CARDIOVASCULAR NEGATIVE: 1
SENSORY CHANGE: 0
WEAKNESS: 0
CONSTITUTIONAL NEGATIVE: 1
TINGLING: 0
ROS SKIN COMMENTS: BRUISING AND SWELLING
PSYCHIATRIC NEGATIVE: 1
RESPIRATORY NEGATIVE: 1

## 2023-11-10 ASSESSMENT — FIBROSIS 4 INDEX: FIB4 SCORE: 0.79

## 2023-11-10 NOTE — LETTER
[address],   [address] [place] [address]  Phone:  [phone] - Fax:  [phone]   [facility] Progress Report and Disability Certification  Date of Service: 11/10/2023   No Show:  No  Date / Time of Next Visit: 12/7/2023@1:30 PM   Claim Information   Patient Name: Alondra Huerta  Claim Number:     Employer: [employer]  Date of Injury: 8/2/2023     Insurer / TPA: Esis  ID / SSN:     Occupation: Med Rech Tech (Pharmacy)  Diagnosis: The encounter diagnosis was Sprain of anterior talofibular ligament of left ankle, subsequent encounter.    Medical Information   Related to Industrial Injury? Yes    Subjective Complaints:  DOI: 8/2/2023. BIANKA:  While walking she suffered a mechanical fall, twisting her left ankle falling to the ground, landing on her right hip.  Today she states that ankle pain has been improved, noticed some bruising after PT yesterday.  She states she has less intermittent pain and swelling. She has been able to ambulate with minimal difficulty.   She denies numbness and tingling.  She is taking ibuprofen, icing, and elevating for her symptoms only as needed.  She is also doing warm Epsom salt soaks which seem to be helping.  She is weaned the walking boot with minimal difficulty.  She has been tolerating full duty with minimal difficulty. Physical therapy has been helping.  Plan of care discussed with patient.   Objective Findings: Right ankle: Mild edema and ecchymosis along the medial malleolus.  FROM with mild discernible discomfort with inversion.  Mild TTP along the ATFL, CFL, navicular and middle one third of metatarsals 3 and 4 without any step-off or crepitus.  Achilles mechanism intact.  Positive anterior drawer test with gross laxity.   Pre-Existing Condition(s):     Assessment:   Condition Improved    Status: Additional Care Required  Permanent Disability:No    Plan: PT    Diagnostics:      Comments:  Follow-up  in 4 weeks, unless seen by orthopedics  Full duty   Physical therapy appointments as scheduled   Recommend continue with ankle splint only as needed   Recommend continue with ice, elevation, and gentle range of motion and stretching as tolerated  Continue with OTC Tylenol/ibuprofen and Epsom salt soaks         Disability Information   Status: Released to Full Duty    From:  11/10/2023  Through: 12/7/2023 Restrictions are:     Physical Restrictions   Sitting:    Standing:    Stooping:    Bending:      Squatting:    Walking:    Climbing:    Pushing:      Pulling:    Other:    Reaching Above Shoulder (L):   Reaching Above Shoulder (R):       Reaching Below Shoulder (L):    Reaching Below Shoulder (R):      Not to exceed Weight Limits   Carrying(hrs):   Weight Limit(lb):   Lifting(hrs):   Weight  Limit(lb):     Comments:      Repetitive Actions   Hands: i.e. Fine Manipulations from Grasping:     Feet: i.e. Operating Foot Controls:     Driving / Operate Machinery:     Health Care Provider’s Original or Electronic Signature  GALI Perales Health Care Provider’s Original or Electronic Signature    Talib Smith DO MPH     Clinic Name / Location: [address] [address] Clinic Phone Number: Dept: [phone]   Appointment Time: 1:00 Pm Visit Start Time: 12:57 PM   Check-In Time:  12:33 Pm Visit Discharge Time:  1:30 PM   Original-Treating Physician or Chiropractor    Page 2-Insurer/TPA    Page 3-Employer    Page 4-Employee

## 2023-11-10 NOTE — PROGRESS NOTES
"Subjective:     Alondra Huerta is a 43 y.o. female who presents for Follow-Up (WC DOI 8/2/23 Lt ankle, rm 16)      DOI: 8/2/2023. BIANKA:  While walking she suffered a mechanical fall, twisting her left ankle falling to the ground, landing on her right hip.  Today she states that ankle pain has been improved, noticed some bruising after PT yesterday.  She states she has less intermittent pain and swelling. She has been able to ambulate with minimal difficulty.   She denies numbness and tingling.  She is taking ibuprofen, icing, and elevating for her symptoms only as needed.  She is also doing warm Epsom salt soaks which seem to be helping.  She is weaned the walking boot with minimal difficulty.  She has been tolerating full duty with minimal difficulty. Physical therapy has been helping.  Plan of care discussed with patient.    Review of Systems   Constitutional: Negative.    Respiratory: Negative.     Cardiovascular: Negative.    Musculoskeletal:  Positive for myalgias. Negative for joint pain.   Skin:         Bruising and swelling    Neurological:  Negative for tingling, sensory change and weakness.   Psychiatric/Behavioral: Negative.         [employer] Works as [employer] at [employer]  FH: No pertinent family history to this problem       Objective:     /78   Pulse 88   Temp 36.7 °C (98 °F)   Resp 18   Ht 1.702 m (5' 7\")   Wt 73.5 kg (162 lb)   SpO2 99%   BMI 25.37 kg/m²     Constitutional: Patient is in no acute distress. Appears well-developed and well-nourished.   Cardiovascular: Normal rate.    Pulmonary/Chest: Effort normal. No respiratory distress.   Neurological: Patient is alert and oriented to person, place, and time.   Skin: Skin is warm and dry.   Psychiatric: Normal mood and affect. Behavior is normal.     Right ankle: Mild edema and ecchymosis along the medial malleolus.  FROM with mild discernible discomfort with inversion.  Mild TTP along the ATFL, CFL, navicular and middle one third of " metatarsals 3 and 4 without any step-off or crepitus.  Achilles mechanism intact.  Positive anterior drawer test with gross laxity.    Assessment/Plan:       1. Sprain of anterior talofibular ligament of left ankle, subsequent encounter    Released to Full Duty FROM 11/10/2023 TO 12/7/2023       Follow-up in 4 weeks, unless seen by orthopedics  Full duty   Physical therapy appointments as scheduled   Recommend continue with ankle splint only as needed   Recommend continue with ice, elevation, and gentle range of motion and stretching as tolerated  Continue with OTC Tylenol/ibuprofen and Epsom salt soaks         Differential diagnosis, natural history, supportive care, and indications for immediate follow-up discussed.    Approximately 25 minutes was spent in preparing for visit, obtaining history, exam and evaluation, patient counseling/education and post visit documentation/orders.

## 2023-11-15 DIAGNOSIS — R23.2 HOT FLASHES: ICD-10-CM

## 2023-11-16 ENCOUNTER — PATIENT MESSAGE (OUTPATIENT)
Dept: MEDICAL GROUP | Facility: PHYSICIAN GROUP | Age: 43
End: 2023-11-16
Payer: COMMERCIAL

## 2023-11-16 RX ORDER — VENLAFAXINE HYDROCHLORIDE 37.5 MG/1
37.5 CAPSULE, EXTENDED RELEASE ORAL DAILY
Qty: 90 CAPSULE | Refills: 0 | Status: SHIPPED | OUTPATIENT
Start: 2023-11-16 | End: 2024-02-17 | Stop reason: SDUPTHER

## 2023-11-16 RX ORDER — BUTALBITAL, ACETAMINOPHEN AND CAFFEINE 50; 325; 40 MG/1; MG/1; MG/1
1 TABLET ORAL EVERY 4 HOURS PRN
Qty: 30 TABLET
Start: 2023-11-16

## 2023-11-16 RX ORDER — BUTALBITAL, ACETAMINOPHEN AND CAFFEINE 50; 325; 40 MG/1; MG/1; MG/1
1 TABLET ORAL EVERY 4 HOURS PRN
COMMUNITY

## 2023-11-16 NOTE — PATIENT COMMUNICATION
butalbital/apap/caffeine (FIORICET) -40 mg Tab [[id number]]  ENDED     Received request via: Patient    Was the patient seen in the last year in this department? Yes    Does the patient have an active prescription (recently filled or refills available) for medication(s) requested? No    Does the patient have [facility] Plus and need 100 day supply (blood pressure, diabetes and cholesterol meds only)? Patient does not have SCP

## 2023-11-17 DIAGNOSIS — G43.109 MIGRAINE WITH AURA AND WITHOUT STATUS MIGRAINOSUS, NOT INTRACTABLE: ICD-10-CM

## 2023-11-17 PROCEDURE — RXMED WILLOW AMBULATORY MEDICATION CHARGE: Performed by: NURSE PRACTITIONER

## 2023-11-17 NOTE — TELEPHONE ENCOUNTER
Requested Prescriptions     Pending Prescriptions Disp Refills    venlafaxine XR (EFFEXOR XR) 37.5 MG CAPSULE SR 24 HR 90 Capsule 0     Sig: Take 1 capsule by mouth every day.       SAMANTHA Marsh.

## 2023-11-20 RX ORDER — BUTALBITAL, ACETAMINOPHEN AND CAFFEINE 50; 325; 40 MG/1; MG/1; MG/1
1 TABLET ORAL EVERY 6 HOURS PRN
Qty: 10 TABLET | Refills: 0 | OUTPATIENT
Start: 2023-11-20 | End: 2023-12-20

## 2023-11-26 ENCOUNTER — PHARMACY VISIT (OUTPATIENT)
Dept: PHARMACY | Facility: MEDICAL CENTER | Age: 43
End: 2023-11-26
Payer: COMMERCIAL

## 2023-11-26 PROCEDURE — RXMED WILLOW AMBULATORY MEDICATION CHARGE: Performed by: NURSE PRACTITIONER

## 2023-12-07 ENCOUNTER — OCCUPATIONAL MEDICINE (OUTPATIENT)
Dept: OCCUPATIONAL MEDICINE | Facility: CLINIC | Age: 43
End: 2023-12-07
Payer: COMMERCIAL

## 2023-12-07 VITALS
HEIGHT: 67 IN | RESPIRATION RATE: 14 BRPM | WEIGHT: 167 LBS | BODY MASS INDEX: 26.21 KG/M2 | DIASTOLIC BLOOD PRESSURE: 78 MMHG | SYSTOLIC BLOOD PRESSURE: 122 MMHG | HEART RATE: 69 BPM | OXYGEN SATURATION: 100 % | TEMPERATURE: 98.4 F

## 2023-12-07 DIAGNOSIS — S93.492D SPRAIN OF ANTERIOR TALOFIBULAR LIGAMENT OF LEFT ANKLE, SUBSEQUENT ENCOUNTER: ICD-10-CM

## 2023-12-07 PROCEDURE — 3078F DIAST BP <80 MM HG: CPT | Performed by: NURSE PRACTITIONER

## 2023-12-07 PROCEDURE — 99213 OFFICE O/P EST LOW 20 MIN: CPT | Performed by: NURSE PRACTITIONER

## 2023-12-07 PROCEDURE — 3074F SYST BP LT 130 MM HG: CPT | Performed by: NURSE PRACTITIONER

## 2023-12-07 ASSESSMENT — ENCOUNTER SYMPTOMS
PSYCHIATRIC NEGATIVE: 1
CONSTITUTIONAL NEGATIVE: 1
SENSORY CHANGE: 0
CARDIOVASCULAR NEGATIVE: 1
TINGLING: 0
WEAKNESS: 0
RESPIRATORY NEGATIVE: 1
MYALGIAS: 1

## 2023-12-07 ASSESSMENT — FIBROSIS 4 INDEX: FIB4 SCORE: 0.79

## 2023-12-07 NOTE — LETTER
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    Medical Information   Related to Industrial Injury? Yes    Subjective Complaints:  DOI: 8/2/2023. BIANKA:  While walking she suffered a mechanical fall, twisting her left ankle falling to the ground, landing on her right hip.  Today patient states that she still gets some soreness, overall ankle is doing much better.  She does have occasional swelling but is able to elevate and ice which do help.  She continues to use her ankle splint maybe once or twice a week primarily at work.  She has been doing the exercises at physical therapy provided her at home.  She completed physical therapy with minimal difficulty.  She will be released from care at this time.  Plan of care discussed with patient.   Objective Findings: Right ankle: Mild edema along the medial malleolus.  FROM.  No TTP along the ATFL, CFL, navicular and middle one third of metatarsals 3 and 4 without any step-off or crepitus.  Achilles mechanism intact.  Negative anterior drawer test. No gait abnormalities noted.    Pre-Existing Condition(s):     Assessment:   Condition Improved    Status: Discharged /  MMI  Permanent Disability:No    Plan:      Diagnostics:      Comments:  Discharge/MMI  Released to full duty  Follow-up if needed    Disability Information   Status: Released to Full Duty    From:  12/7/2023  Through:    Restrictions are:     Physical Restrictions   Sitting:    Standing:    Stooping:    Bending:      Squatting:    Walking:    Climbing:    Pushing:      Pulling:    Other:    Reaching Above Shoulder (L):   Reaching Above Shoulder (R):       Reaching Below Shoulder (L):    Reaching Below Shoulder (R):      Not to exceed Weight Limits   Carrying(hrs):   Weight Limit(lb):   Lifting(hrs):   Weight  Limit(lb):     Comments:      Repetitive Actions   Hands: i.e. Fine Manipulations from Grasping:     Feet: i.e. Operating Foot Controls:     Driving / Operate Machinery:     Health Care Provider’s Original or Electronic Signature  GALI Perales Health Care Provider’s Original or Electronic Signature    Talib Smith DO MPH     Clinic Name / Location: [address] [address] Clinic Phone Number: Dept: [phone]   Appointment Time: 1:30 Pm Visit Start Time: 1:14 PM   Check-In Time:  1:12 Pm Visit Discharge Time:  1:51pm   Original-Treating Physician or Chiropractor    Page 2-Insurer/TPA    Page 3-Employer    Page 4-Employee

## 2023-12-07 NOTE — PROGRESS NOTES
"Subjective:     Alondra Huerta is a 43 y.o. female who presents for Follow-Up (DOI: 8/2/2023.  left ankle)      DOI: 8/2/2023. BIANKA:  While walking she suffered a mechanical fall, twisting her left ankle falling to the ground, landing on her right hip.  Today patient states that she still gets some soreness, overall ankle is doing much better.  She does have occasional swelling but is able to elevate and ice which do help.  She continues to use her ankle splint maybe once or twice a week primarily at work.  She has been doing the exercises at physical therapy provided her at home.  She completed physical therapy with minimal difficulty.  She will be released from care at this time.  Plan of care discussed with patient.    Review of Systems   Constitutional: Negative.    Respiratory: Negative.     Cardiovascular: Negative.    Musculoskeletal:  Positive for myalgias. Negative for joint pain.   Skin:         Left ankle: intermittent swelling   Neurological:  Negative for tingling, sensory change and weakness.   Psychiatric/Behavioral: Negative.         SOCHX: Works as [employer] at [employer]  FH: No pertinent family history to this problem       Objective:     /78   Pulse 69   Temp 36.9 °C (98.4 °F) (Temporal)   Resp 14   Ht 1.702 m (5' 7\")   Wt 75.8 kg (167 lb)   SpO2 100%   BMI 26.16 kg/m²     Constitutional: Patient is in no acute distress. Appears well-developed and well-nourished.   Cardiovascular: Normal rate.    Pulmonary/Chest: Effort normal. No respiratory distress.   Neurological: Patient is alert and oriented to person, place, and time.   Skin: Skin is warm and dry.   Psychiatric: Normal mood and affect. Behavior is normal.     Left ankle: Mild edema along the medial malleolus.  FROM.  No TTP along the ATFL, CFL, navicular and middle one third of metatarsals 3 and 4 without any step-off or crepitus.  Achilles mechanism intact.  Negative anterior drawer test. No gait abnormalities noted. "     Assessment/Plan:       1. Sprain of anterior talofibular ligament of left ankle, subsequent encounter    Released to Full Duty FROM 12/7/2023 TO         Discharge/MMI  Released to full duty  Follow-up if needed    Differential diagnosis, natural history, supportive care, and indications for immediate follow-up discussed.    Approximately 25 minutes was spent in preparing for visit, obtaining history, exam and evaluation, patient counseling/education and post visit documentation/orders.

## 2024-01-01 ENCOUNTER — OFFICE VISIT (OUTPATIENT)
Dept: URGENT CARE | Facility: CLINIC | Age: 44
End: 2024-01-01
Payer: COMMERCIAL

## 2024-01-01 VITALS
DIASTOLIC BLOOD PRESSURE: 76 MMHG | SYSTOLIC BLOOD PRESSURE: 118 MMHG | RESPIRATION RATE: 12 BRPM | HEIGHT: 67 IN | TEMPERATURE: 97.4 F | BODY MASS INDEX: 26.68 KG/M2 | WEIGHT: 170 LBS | HEART RATE: 72 BPM | OXYGEN SATURATION: 98 %

## 2024-01-01 DIAGNOSIS — J01.90 ACUTE NON-RECURRENT SINUSITIS, UNSPECIFIED LOCATION: ICD-10-CM

## 2024-01-01 PROCEDURE — 3078F DIAST BP <80 MM HG: CPT | Performed by: PHYSICIAN ASSISTANT

## 2024-01-01 PROCEDURE — 3074F SYST BP LT 130 MM HG: CPT | Performed by: PHYSICIAN ASSISTANT

## 2024-01-01 PROCEDURE — 99214 OFFICE O/P EST MOD 30 MIN: CPT | Performed by: PHYSICIAN ASSISTANT

## 2024-01-01 RX ORDER — FLUTICASONE PROPIONATE 50 MCG
1 SPRAY, SUSPENSION (ML) NASAL DAILY
Qty: 16 G | Refills: 0 | Status: SHIPPED | OUTPATIENT
Start: 2024-01-01

## 2024-01-01 RX ORDER — AMOXICILLIN AND CLAVULANATE POTASSIUM 875; 125 MG/1; MG/1
1 TABLET, FILM COATED ORAL 2 TIMES DAILY
Qty: 14 TABLET | Refills: 0 | Status: SHIPPED | OUTPATIENT
Start: 2024-01-01 | End: 2024-01-08

## 2024-01-01 ASSESSMENT — ENCOUNTER SYMPTOMS
SHORTNESS OF BREATH: 0
SINUS PRESSURE: 1
COUGH: 1
SORE THROAT: 0
HOARSE VOICE: 1
HEADACHES: 1

## 2024-01-01 ASSESSMENT — FIBROSIS 4 INDEX: FIB4 SCORE: 0.79

## 2024-01-01 NOTE — PROGRESS NOTES
Subjective:   Alondra Huerta is a 43 y.o. female who presents for Cough (X 1 month, runny nose)        Sinus Problem  This is a new problem. The current episode started more than 1 month ago. There has been no fever. The pain is moderate. Associated symptoms include congestion, coughing (occasionally dry and occasionally productive), headaches, a hoarse voice and sinus pressure (maxillary and frontal). Pertinent negatives include no ear pain, shortness of breath or sore throat. Treatments tried: nettipot, mucinex, flonase, afrin (only used for a few days) The treatment provided mild relief.     Review of Systems   HENT:  Positive for congestion, hoarse voice and sinus pressure (maxillary and frontal). Negative for ear pain and sore throat.    Respiratory:  Positive for cough (occasionally dry and occasionally productive). Negative for shortness of breath.    Neurological:  Positive for headaches.       PMH:  has a past medical history of Anxiety disorder, Anxiety disorder (3/11/2019), Hot flashes (6/2/2020), and Migraine.  MEDS:   Current Outpatient Medications:     amoxicillin-clavulanate (AUGMENTIN) 875-125 MG Tab, Take 1 Tablet by mouth 2 times a day for 7 days., Disp: 14 Tablet, Rfl: 0    fluticasone (FLONASE) 50 MCG/ACT nasal spray, Administer 1 Spray into affected nostril(S) every day., Disp: 16 g, Rfl: 0    venlafaxine XR (EFFEXOR XR) 37.5 MG CAPSULE SR 24 HR, Take 1 capsule by mouth every day., Disp: 90 Capsule, Rfl: 0    butalbital/apap/caffeine (FIORICET) -40 mg Tab, Take 1 Tablet by mouth every four hours as needed for Headache., Disp: , Rfl:     topiramate (TOPAMAX) 100 MG Tab, Take 1 Tablet by mouth 2 times a day., Disp: 180 Tablet, Rfl: 2    Cetirizine-Pseudoephedrine (ZYRTEC-D PO), 5/120 qd, Disp: , Rfl:     albuterol 108 (90 Base) MCG/ACT Aero Soln inhalation aerosol, Inhale 1-2 Puffs every four hours as needed for Shortness of Breath., Disp: 8.5 g, Rfl: 0    meclizine (ANTIVERT) 25 MG Tab,  "Take 1 Tablet by mouth 3 times a day as needed for Dizziness., Disp: 30 Tablet, Rfl: 0    fluticasone (FLONASE) 50 MCG/ACT nasal spray, Administer 1 Spray into affected nostril(S) every day., Disp: , Rfl:     guaiFENesin ER (MUCINEX) 600 MG TABLET SR 12 HR, Take 600 mg by mouth every 12 hours., Disp: , Rfl:     rizatriptan (MAXALT) 5 MG tablet, Take 1 Tab by mouth Once PRN for Migraine for up to 1 dose., Disp: 10 Tab, Rfl: 0    asa/apap/caffeine (EXCEDRIN) 250-250-65 MG Tab, Take 1 Tab by mouth as needed for Headache., Disp: , Rfl:   ALLERGIES:   Allergies   Allergen Reactions    Iodine Anaphylaxis and Shortness of Breath     IV contrast     Shellfish Allergy Anaphylaxis     SURGHX:   Past Surgical History:   Procedure Laterality Date    ANKLE ARTHROSCOPY Left 5/15/2017    Procedure: ANKLE ARTHROSCOPY;  Surgeon: Vaibhav Casper M.D.;  Location: SURGERY [facility], [place];  Service:     ANKLE LIGAMENT RECONSTRUCTION  5/15/2017    Procedure: ANKLE LIGAMENT RECONSTRUCTION-LATERAL;  Surgeon: Vaibhav Casper M.D.;  Location: SURGERY [facility], [place];  Service:     VAGINAL HYSTERECTOMY SCOPE TOTAL  5/22/2009    Performed by SHELLY GUNDERSON at SURGERY SAME DAY [facility]    SALPINGECTOMY  5/22/2009    Performed by SHELLY GUNDERSON at SURGERY SAME DAY [facility]    ANKLE ARTHROSCOPY Left     GYN SURGERY  1999,2005    C Section x2     PRIMARY C SECTION      twice      SOCHX:  reports that she has been smoking cigarettes. She has a 10.0 pack-year smoking history. She has never used smokeless tobacco. She reports current alcohol use. She reports that she does not use drugs.  FH: Family history was reviewed, no pertinent findings to report   Objective:   /76   Pulse 72   Temp 36.3 °C (97.4 °F) (Temporal)   Resp 12   Ht 1.702 m (5' 7\")   Wt 77.1 kg (170 lb)   SpO2 98%   BMI 26.63 kg/m²   Physical Exam  Vitals reviewed.   Constitutional:       General: She is not in acute distress.     Appearance: Normal " appearance. She is well-developed. She is not toxic-appearing.   HENT:      Head: Normocephalic and atraumatic.      Right Ear: Tympanic membrane, ear canal and external ear normal.      Left Ear: Tympanic membrane, ear canal and external ear normal.      Nose: Congestion and rhinorrhea present. Rhinorrhea is purulent.      Right Sinus: Maxillary sinus tenderness and frontal sinus tenderness present.      Left Sinus: Maxillary sinus tenderness and frontal sinus tenderness present.      Mouth/Throat:      Lips: Pink.      Mouth: Mucous membranes are moist.      Pharynx: Oropharynx is clear. Uvula midline. No posterior oropharyngeal erythema.   Cardiovascular:      Rate and Rhythm: Normal rate and regular rhythm.      Heart sounds: Normal heart sounds, S1 normal and S2 normal.   Pulmonary:      Effort: Pulmonary effort is normal. No respiratory distress.      Breath sounds: Normal breath sounds. No stridor. No decreased breath sounds, wheezing, rhonchi or rales.   Skin:     General: Skin is dry.   Neurological:      Comments: Alert and oriented.    Psychiatric:         Speech: Speech normal.         Behavior: Behavior normal.           Assessment/Plan:   1. Acute non-recurrent sinusitis, unspecified location  - amoxicillin-clavulanate (AUGMENTIN) 875-125 MG Tab; Take 1 Tablet by mouth 2 times a day for 7 days.  Dispense: 14 Tablet; Refill: 0  - fluticasone (FLONASE) 50 MCG/ACT nasal spray; Administer 1 Spray into affected nostril(S) every day.  Dispense: 16 g; Refill: 0        Considerations include but not limited to sinusitis, allergic rhinitis, viral URI, bronchitis, pneumonia.  History and exam today consistent with sinusitis.  Given duration of progression of symptoms I suspect that this is bacterial in nature.    Patient started on antibiotic therapy.  Recommend taking these with food to avoid GI upset.  Concurrent use of probiotic may also be beneficial.    Flonase 1 squirt in each nostril, as instructed in  clinic, once a day.  Use nasal saline TID to promote drainage.   Salt water gurgles to soothe sore throat.  Ayr saline gel to moisturize nasal passage and prevent bleeding.  Try to use sudafed sparingly in order to allow sinuses to drain.  Avoid the longer formulations and try to take only in the morning and/or at noon if needed.  If you fail to improve in 3-5 days or symptoms worsen/new symptoms develop, RTC for reevaluation

## 2024-02-17 DIAGNOSIS — R23.2 HOT FLASHES: ICD-10-CM

## 2024-02-17 PROCEDURE — RXMED WILLOW AMBULATORY MEDICATION CHARGE: Performed by: NURSE PRACTITIONER

## 2024-02-20 PROCEDURE — RXMED WILLOW AMBULATORY MEDICATION CHARGE: Performed by: NURSE PRACTITIONER

## 2024-02-20 RX ORDER — VENLAFAXINE HYDROCHLORIDE 37.5 MG/1
37.5 CAPSULE, EXTENDED RELEASE ORAL DAILY
Qty: 90 CAPSULE | Refills: 0 | Status: SHIPPED | OUTPATIENT
Start: 2024-02-20

## 2024-02-20 NOTE — TELEPHONE ENCOUNTER
Received request via: Pharmacy    Was the patient seen in the last year in this department? Yes    Does the patient have an active prescription (recently filled or refills available) for medication(s) requested? No    Pharmacy Name: [facility]    Does the patient have intermediate Plus and need 100 day supply (blood pressure, diabetes and cholesterol meds only)? Patient does not have SCP

## 2024-02-22 ENCOUNTER — PHARMACY VISIT (OUTPATIENT)
Dept: PHARMACY | Facility: MEDICAL CENTER | Age: 44
End: 2024-02-22
Payer: COMMERCIAL

## 2024-03-12 ENCOUNTER — HOSPITAL ENCOUNTER (OUTPATIENT)
Facility: MEDICAL CENTER | Age: 44
End: 2024-03-12
Attending: PHYSICIAN ASSISTANT
Payer: COMMERCIAL

## 2024-03-12 ENCOUNTER — OFFICE VISIT (OUTPATIENT)
Dept: URGENT CARE | Facility: PHYSICIAN GROUP | Age: 44
End: 2024-03-12
Payer: COMMERCIAL

## 2024-03-12 ENCOUNTER — HOSPITAL ENCOUNTER (OUTPATIENT)
Dept: RADIOLOGY | Facility: MEDICAL CENTER | Age: 44
End: 2024-03-12
Attending: PHYSICIAN ASSISTANT
Payer: COMMERCIAL

## 2024-03-12 VITALS
HEART RATE: 83 BPM | TEMPERATURE: 97.4 F | BODY MASS INDEX: 27.47 KG/M2 | SYSTOLIC BLOOD PRESSURE: 120 MMHG | RESPIRATION RATE: 20 BRPM | WEIGHT: 175 LBS | OXYGEN SATURATION: 98 % | DIASTOLIC BLOOD PRESSURE: 88 MMHG | HEIGHT: 67 IN

## 2024-03-12 DIAGNOSIS — R10.30 LOWER ABDOMINAL PAIN: ICD-10-CM

## 2024-03-12 DIAGNOSIS — N93.9 VAGINAL BLEEDING: ICD-10-CM

## 2024-03-12 LAB
APPEARANCE UR: NORMAL
BILIRUB UR STRIP-MCNC: NEGATIVE MG/DL
COLOR UR AUTO: NORMAL
GLUCOSE UR STRIP.AUTO-MCNC: NEGATIVE MG/DL
KETONES UR STRIP.AUTO-MCNC: NEGATIVE MG/DL
LEUKOCYTE ESTERASE UR QL STRIP.AUTO: NEGATIVE
NITRITE UR QL STRIP.AUTO: NEGATIVE
PH UR STRIP.AUTO: 6 [PH] (ref 5–8)
PROT UR QL STRIP: NEGATIVE MG/DL
RBC UR QL AUTO: NEGATIVE
SP GR UR STRIP.AUTO: 1.02
UROBILINOGEN UR STRIP-MCNC: 0.2 MG/DL

## 2024-03-12 PROCEDURE — 3074F SYST BP LT 130 MM HG: CPT | Performed by: PHYSICIAN ASSISTANT

## 2024-03-12 PROCEDURE — 99214 OFFICE O/P EST MOD 30 MIN: CPT | Performed by: PHYSICIAN ASSISTANT

## 2024-03-12 PROCEDURE — 74176 CT ABD & PELVIS W/O CONTRAST: CPT

## 2024-03-12 PROCEDURE — 3079F DIAST BP 80-89 MM HG: CPT | Performed by: PHYSICIAN ASSISTANT

## 2024-03-12 PROCEDURE — 87480 CANDIDA DNA DIR PROBE: CPT

## 2024-03-12 PROCEDURE — 87510 GARDNER VAG DNA DIR PROBE: CPT

## 2024-03-12 PROCEDURE — 81002 URINALYSIS NONAUTO W/O SCOPE: CPT | Performed by: PHYSICIAN ASSISTANT

## 2024-03-12 PROCEDURE — 87660 TRICHOMONAS VAGIN DIR PROBE: CPT

## 2024-03-12 ASSESSMENT — ENCOUNTER SYMPTOMS
CARDIOVASCULAR NEGATIVE: 1
FLANK PAIN: 0
VOMITING: 0
ABDOMINAL PAIN: 1
CONSTITUTIONAL NEGATIVE: 1
RESPIRATORY NEGATIVE: 1
DIARRHEA: 0
NAUSEA: 0

## 2024-03-12 ASSESSMENT — FIBROSIS 4 INDEX: FIB4 SCORE: 0.79

## 2024-03-13 DIAGNOSIS — N76.0 BV (BACTERIAL VAGINOSIS): ICD-10-CM

## 2024-03-13 DIAGNOSIS — B96.89 BV (BACTERIAL VAGINOSIS): ICD-10-CM

## 2024-03-13 LAB
CANDIDA DNA VAG QL PROBE+SIG AMP: NEGATIVE
G VAGINALIS DNA VAG QL PROBE+SIG AMP: POSITIVE
T VAGINALIS DNA VAG QL PROBE+SIG AMP: NEGATIVE

## 2024-03-13 RX ORDER — METRONIDAZOLE 500 MG/1
500 TABLET ORAL 2 TIMES DAILY
Qty: 14 TABLET | Refills: 0 | Status: SHIPPED | OUTPATIENT
Start: 2024-03-13 | End: 2024-03-20

## 2024-03-13 NOTE — PROGRESS NOTES
"  Subjective:     Alondra Huerta  is a 43 y.o. female who presents for Other (Blood in urine x this afternoon. Pressure in the pelvic area. Really bad cramps. Had a hysterectomy in [id number].)       She presents today with lower abdominal and pelvic cramping that began earlier today.  She states that she also noticed bright red blood on the toilet paper when she wiped after urinating, patient does feel that it came from the vaginal canal.  She does not have any painful urination, no urinary frequency or urgency.  No fevers.  Last bowel movement was this morning.  She describes the pain as coming and going but is rated at 7/10 at worst.  Previous hysterectomy roughly 14 years ago, they only left a quarter of a ovary.  She denies any vaginal discharge.       Review of Systems   Constitutional: Negative.    Respiratory: Negative.     Cardiovascular: Negative.    Gastrointestinal:  Positive for abdominal pain. Negative for diarrhea, nausea and vomiting.   Genitourinary:  Negative for dysuria, flank pain, frequency, hematuria and urgency.      Allergies   Allergen Reactions    Iodine Anaphylaxis and Shortness of Breath     IV contrast     Shellfish Allergy Anaphylaxis     Past Medical History:   Diagnosis Date    Anxiety disorder     Anxiety disorder 3/11/2019    Hot flashes 6/2/2020    Migraine         Objective:   /88 (BP Location: Left arm, Patient Position: Sitting, BP Cuff Size: Adult long)   Pulse 83   Temp 36.3 °C (97.4 °F) (Temporal)   Resp 20   Ht 1.702 m (5' 7\")   Wt 79.4 kg (175 lb)   SpO2 98%   BMI 27.41 kg/m²   Physical Exam  Vitals and nursing note reviewed.   Constitutional:       General: She is not in acute distress.     Appearance: She is not ill-appearing or toxic-appearing.   HENT:      Head: Normocephalic.   Eyes:      General: No scleral icterus.     Conjunctiva/sclera: Conjunctivae normal.   Cardiovascular:      Rate and Rhythm: Normal rate and regular rhythm.   Pulmonary:      " Effort: Pulmonary effort is normal. No respiratory distress.      Breath sounds: Normal breath sounds. No stridor.   Abdominal:      General: Abdomen is flat. Bowel sounds are normal. There is no distension.      Palpations: Abdomen is soft.      Tenderness: There is abdominal tenderness (Most severe in the right lower and left lower quadrants, was having pain over the right upper and left upper quadrants). There is guarding (With palpation of right upper, left upper and right lower quadrants). There is no right CVA tenderness or left CVA tenderness.   Musculoskeletal:      Cervical back: Neck supple.   Neurological:      Mental Status: She is alert and oriented to person, place, and time.   Psychiatric:         Mood and Affect: Mood normal.         Behavior: Behavior normal.         Thought Content: Thought content normal.         Judgment: Judgment normal.           Diagnostic testing:    POC urinalysis-all within normal limits    [facility] path-pending    CT scan abdomen pelvis without contrast  Radiologist IMPRESSION:     1.  Moderate constipation     2.  Previous hysterectomy    Assessment/Plan:     Encounter Diagnoses   Name Primary?    Lower abdominal pain     Vaginal bleeding           Plan for care for today's complaint includes obtaining a CT scan due to the severe and diffuse nature of patient's abdominal pain on today's examination, this did reveal moderate constipation.  Instructed patient to use over-the-counter stool softeners.  We did obtain a urinalysis today, this was completely normal, no evidence of urinary tract infection based on this test result and based on symptom presentation.  Did obtain vaginal pathogen swab for further evaluation of possible causes of patient's vaginal bleeding; will contact the patient via [employer] message to discuss the results of the testing obtained today, will adjust treatment plan accordingly.  If this test result is negative then we will obtain a vaginal ultrasound.   Overall patient was well-appearing and vital signs are stable today.  Continue to monitor symptoms and return to urgent care or follow-up with primary care provider if symptoms remain ongoing.  Follow-up in the emergency department if symptoms become severe, ER precautions discussed in office today..    See AVS Instructions below for written guidance provided to patient on after-visit management and care in addition to our verbal discussion during the visit.    Please note that this dictation was created using voice recognition software. I have attempted to correct all errors, but there may be sound-alike, spelling, grammar and possibly content errors that I did not discover before finalizing the note.    Abdoulfeliz Zapata PA-C

## 2024-05-14 ENCOUNTER — OFFICE VISIT (OUTPATIENT)
Dept: MEDICAL GROUP | Facility: PHYSICIAN GROUP | Age: 44
End: 2024-05-14
Payer: COMMERCIAL

## 2024-05-14 VITALS
HEIGHT: 67 IN | DIASTOLIC BLOOD PRESSURE: 80 MMHG | BODY MASS INDEX: 28.09 KG/M2 | OXYGEN SATURATION: 99 % | HEART RATE: 72 BPM | SYSTOLIC BLOOD PRESSURE: 118 MMHG | WEIGHT: 179 LBS | TEMPERATURE: 97 F

## 2024-05-14 DIAGNOSIS — Z13.0 SCREENING FOR ENDOCRINE, METABOLIC AND IMMUNITY DISORDER: ICD-10-CM

## 2024-05-14 DIAGNOSIS — Z00.00 PREVENTATIVE HEALTH CARE: ICD-10-CM

## 2024-05-14 DIAGNOSIS — Z13.29 SCREENING FOR ENDOCRINE, METABOLIC AND IMMUNITY DISORDER: ICD-10-CM

## 2024-05-14 DIAGNOSIS — R23.2 HOT FLASHES: ICD-10-CM

## 2024-05-14 DIAGNOSIS — E78.00 HYPERCHOLESTEROLEMIA: ICD-10-CM

## 2024-05-14 DIAGNOSIS — Z13.228 SCREENING FOR ENDOCRINE, METABOLIC AND IMMUNITY DISORDER: ICD-10-CM

## 2024-05-14 DIAGNOSIS — G43.109 MIGRAINE WITH AURA AND WITHOUT STATUS MIGRAINOSUS, NOT INTRACTABLE: ICD-10-CM

## 2024-05-14 DIAGNOSIS — Z11.4 SCREENING FOR HIV (HUMAN IMMUNODEFICIENCY VIRUS): ICD-10-CM

## 2024-05-14 DIAGNOSIS — R73.01 ELEVATED FASTING GLUCOSE: ICD-10-CM

## 2024-05-14 PROCEDURE — RXMED WILLOW AMBULATORY MEDICATION CHARGE: Performed by: NURSE PRACTITIONER

## 2024-05-14 PROCEDURE — 99214 OFFICE O/P EST MOD 30 MIN: CPT | Performed by: NURSE PRACTITIONER

## 2024-05-14 PROCEDURE — 3074F SYST BP LT 130 MM HG: CPT | Performed by: NURSE PRACTITIONER

## 2024-05-14 PROCEDURE — 3079F DIAST BP 80-89 MM HG: CPT | Performed by: NURSE PRACTITIONER

## 2024-05-14 RX ORDER — VENLAFAXINE HYDROCHLORIDE 75 MG/1
75 CAPSULE, EXTENDED RELEASE ORAL DAILY
Qty: 90 CAPSULE | Refills: 1 | Status: SHIPPED | OUTPATIENT
Start: 2024-05-14

## 2024-05-14 RX ORDER — CETIRIZINE HYDROCHLORIDE 10 MG/1
TABLET ORAL
COMMUNITY

## 2024-05-14 RX ORDER — TOPIRAMATE 100 MG/1
50 TABLET, FILM COATED ORAL 2 TIMES DAILY
Qty: 180 TABLET | Refills: 2
Start: 2024-05-14

## 2024-05-14 RX ORDER — BUTALBITAL, ACETAMINOPHEN AND CAFFEINE 50; 325; 40 MG/1; MG/1; MG/1
1 TABLET ORAL EVERY 6 HOURS PRN
Qty: 30 TABLET | Refills: 0 | Status: SHIPPED | OUTPATIENT
Start: 2024-05-14 | End: 2024-06-13

## 2024-05-14 ASSESSMENT — PATIENT HEALTH QUESTIONNAIRE - PHQ9: CLINICAL INTERPRETATION OF PHQ2 SCORE: 0

## 2024-05-14 ASSESSMENT — FIBROSIS 4 INDEX: FIB4 SCORE: 0.8

## 2024-05-14 NOTE — ASSESSMENT & PLAN NOTE
Continues topiramate 100 mg twice daily and rizatriptan 5 mg once daily as needed for migraines. Her migraines have increased frequency in the last couple of months, now getting 2 migraines a week that last for 2 days. Migraines are occurring at work and unable to rizatriptan as rizatriptan makes her drowsy. She can take Fioricet that does help. She would like to know if she can increase the topiramate dose. She would also like a refill today on Fioricet. Last dispense 7/20/2023. She has tried amitriptyline and gained weight. Plan to wean topiramate dose off and increase Venlafaxine XR dose.

## 2024-05-14 NOTE — PROGRESS NOTES
Subjective:     CC: Medication management     HPI:   Alondra presents today with the following:    Migraine with aura and without status migrainosus, not intractable  Continues topiramate 100 mg twice daily and rizatriptan 5 mg once daily as needed for migraines. Her migraines have increased frequency in the last couple of months, now getting 2 migraines a week that last for 2 days. Migraines are occurring at work and unable to rizatriptan as rizatriptan makes her drowsy. She can take Fioricet that does help. She would like to know if she can increase the topiramate dose. She would also like a refill today on Fioricet. Last dispense 7/20/2023. She has tried amitriptyline and gained weight. Plan to wean topiramate dose off and increase Venlafaxine XR dose.     Hot flashes  Hot flashes are controlled with venlafaxine XR 37.5 mg daily.     Past Medical History:   Diagnosis Date    Anxiety disorder     Anxiety disorder 3/11/2019    Hot flashes 6/2/2020    Migraine        Social History     Tobacco Use    Smoking status: Every Day     Current packs/day: 0.50     Average packs/day: 0.5 packs/day for 20.0 years (10.0 ttl pk-yrs)     Types: Cigarettes    Smokeless tobacco: Never    Tobacco comments:     under a pack a day   Vaping Use    Vaping Use: Never used   Substance Use Topics    Alcohol use: Yes     Comment: rarely    Drug use: No       Current Outpatient Medications Ordered in Epic   Medication Sig Dispense Refill    cetirizine (ZYRTEC ALLERGY) 10 MG Tab       topiramate (TOPAMAX) 100 MG Tab Take 0.5 Tablets by mouth 2 times a day. 180 Tablet 2    venlafaxine XR (EFFEXOR XR) 75 MG CAPSULE SR 24 HR Take 1 Capsule by mouth every day. 90 Capsule 1    butalbital/apap/caffeine (FIORICET) -40 mg Tab Take 1 Tablet by mouth every 6 hours as needed for Migraine for up to 30 days. 30 Tablet 0    albuterol 108 (90 Base) MCG/ACT Aero Soln inhalation aerosol Inhale 1-2 Puffs every four hours as needed for Shortness of Breath.  "8.5 g 0    meclizine (ANTIVERT) 25 MG Tab Take 1 Tablet by mouth 3 times a day as needed for Dizziness. 30 Tablet 0    fluticasone (FLONASE) 50 MCG/ACT nasal spray Administer 1 Spray into affected nostril(S) every day.      guaiFENesin ER (MUCINEX) 600 MG TABLET SR 12 HR Take 600 mg by mouth every 12 hours.      rizatriptan (MAXALT) 5 MG tablet Take 1 Tab by mouth Once PRN for Migraine for up to 1 dose. 10 Tab 0    asa/apap/caffeine (EXCEDRIN) 250-250-65 MG Tab Take 1 Tab by mouth as needed for Headache.       No current Epic-ordered facility-administered medications on file.       Allergies:  Iodine and Shellfish allergy    Health Maintenance: reviewed. Orders placed      Objective:     Vital signs reviewed  Exam:  /80 (BP Location: Left arm, Patient Position: Sitting, BP Cuff Size: Adult)   Pulse 72   Temp 36.1 °C (97 °F) (Temporal)   Ht 1.702 m (5' 7\")   Wt 81.2 kg (179 lb)   SpO2 99%   BMI 28.04 kg/m²  Body mass index is 28.04 kg/m².    Gen: Alert and oriented, No apparent distress.  Eyes:   Lids normal. Glasses in place.   Lungs: Normal effort, CTA bilaterally, no wheezes, rhonchi, or rales  CV: Regular rate and rhythm. No murmurs, rubs, or gallops.      Assessment & Plan:     44 y.o. female with the following -     1. Migraine with aura and without status migrainosus, not intractable  Chronic exacerbated problem.  Unfortunately topiramate was at the max dose.  We discussed switching to different preventative medication and she is in agreement.  We will increase Venlafaxine XR to 75 mg daily and will wean down her topiramate.  Discussion today that for the next 30 days topiramate dose decreases to 50 mg twice daily.  Then decrease topiramate dose to 25 mg twice daily for 30 days.  Lastly will take 25 mg daily for 30 days and stop.  Refill today on her Fioricet.   reviewed today.  Controlled substance treatment agreement updated today.  - Controlled Substance Treatment Agreement  - topiramate " (TOPAMAX) 100 MG Tab; Take 0.5 Tablets by mouth 2 times a day.  Dispense: 180 Tablet; Refill: 2  - venlafaxine XR (EFFEXOR XR) 75 MG CAPSULE SR 24 HR; Take 1 Capsule by mouth every day.  Dispense: 90 Capsule; Refill: 1  - butalbital/apap/caffeine (FIORICET) -40 mg Tab; Take 1 Tablet by mouth every 6 hours as needed for Migraine for up to 30 days.  Dispense: 30 Tablet; Refill: 0    2. Hot flashes  Chronic stable problem.  Migraines are not controlled and we are going to increase the Venlafaxine XR dose to 75 mg daily.    3. Elevated fasting glucose  Chronic stable problem.  Due for annual labs.  - HEMOGLOBIN A1C; Future    4. Hypercholesterolemia  Chronic stable problem.  Due for annual lab.  - Lipid Profile; Future    5. Preventative health care  Acute uncomplicated problem.  Due for annual labs.    - CBC WITHOUT DIFFERENTIAL; Future  - Comp Metabolic Panel; Future  - Lipid Profile; Future    6. Screening for HIV (human immunodeficiency virus)  Acute uncomplicated problem.  Discussed screening and she is in agreement.  - HIV AG/AB COMBO ASSAY SCREENING; Future    7. Screening for endocrine, metabolic and immunity disorder  Acute uncomplicated problem.  Discussed screening and she is in agreement.  - TSH WITH REFLEX TO FT4; Future  - VITAMIN D,25 HYDROXY (DEFICIENCY); Future        Return in about 3 months (around 8/14/2024) for medication management.    Please note that this dictation was created using voice recognition software. I have made every reasonable attempt to correct obvious errors, but I expect that there are errors of grammar and possibly content that I did not discover before finalizing the note.

## 2024-05-15 ENCOUNTER — PHARMACY VISIT (OUTPATIENT)
Dept: PHARMACY | Facility: MEDICAL CENTER | Age: 44
End: 2024-05-15
Payer: COMMERCIAL

## 2024-06-06 ENCOUNTER — PHARMACY VISIT (OUTPATIENT)
Dept: PHARMACY | Facility: MEDICAL CENTER | Age: 44
End: 2024-06-06
Payer: COMMERCIAL

## 2024-06-06 ENCOUNTER — HOSPITAL ENCOUNTER (OUTPATIENT)
Dept: LAB | Facility: MEDICAL CENTER | Age: 44
End: 2024-06-06
Attending: NURSE PRACTITIONER
Payer: COMMERCIAL

## 2024-06-06 DIAGNOSIS — D72.829 LEUKOCYTOSIS, UNSPECIFIED TYPE: ICD-10-CM

## 2024-06-06 DIAGNOSIS — Z11.4 SCREENING FOR HIV (HUMAN IMMUNODEFICIENCY VIRUS): ICD-10-CM

## 2024-06-06 DIAGNOSIS — Z13.29 SCREENING FOR ENDOCRINE, METABOLIC AND IMMUNITY DISORDER: ICD-10-CM

## 2024-06-06 DIAGNOSIS — Z13.0 SCREENING FOR ENDOCRINE, METABOLIC AND IMMUNITY DISORDER: ICD-10-CM

## 2024-06-06 DIAGNOSIS — Z00.00 PREVENTATIVE HEALTH CARE: ICD-10-CM

## 2024-06-06 DIAGNOSIS — E78.00 HYPERCHOLESTEROLEMIA: ICD-10-CM

## 2024-06-06 DIAGNOSIS — R73.01 ELEVATED FASTING GLUCOSE: ICD-10-CM

## 2024-06-06 DIAGNOSIS — E55.9 VITAMIN D DEFICIENCY: ICD-10-CM

## 2024-06-06 DIAGNOSIS — Z13.228 SCREENING FOR ENDOCRINE, METABOLIC AND IMMUNITY DISORDER: ICD-10-CM

## 2024-06-06 LAB
25(OH)D3 SERPL-MCNC: 14 NG/ML (ref 30–100)
ALBUMIN SERPL BCP-MCNC: 4.1 G/DL (ref 3.2–4.9)
ALBUMIN/GLOB SERPL: 1.6 G/DL
ALP SERPL-CCNC: 101 U/L (ref 30–99)
ALT SERPL-CCNC: 23 U/L (ref 2–50)
ANION GAP SERPL CALC-SCNC: 10 MMOL/L (ref 7–16)
AST SERPL-CCNC: 18 U/L (ref 12–45)
BILIRUB SERPL-MCNC: 0.3 MG/DL (ref 0.1–1.5)
BUN SERPL-MCNC: 13 MG/DL (ref 8–22)
CALCIUM ALBUM COR SERPL-MCNC: 8.9 MG/DL (ref 8.5–10.5)
CALCIUM SERPL-MCNC: 9 MG/DL (ref 8.4–10.2)
CHLORIDE SERPL-SCNC: 109 MMOL/L (ref 96–112)
CHOLEST SERPL-MCNC: 200 MG/DL (ref 100–199)
CO2 SERPL-SCNC: 21 MMOL/L (ref 20–33)
CREAT SERPL-MCNC: 0.72 MG/DL (ref 0.5–1.4)
ERYTHROCYTE [DISTWIDTH] IN BLOOD BY AUTOMATED COUNT: 42.5 FL (ref 35.9–50)
EST. AVERAGE GLUCOSE BLD GHB EST-MCNC: 114 MG/DL
FASTING STATUS PATIENT QL REPORTED: NORMAL
GFR SERPLBLD CREATININE-BSD FMLA CKD-EPI: 104 ML/MIN/1.73 M 2
GLOBULIN SER CALC-MCNC: 2.6 G/DL (ref 1.9–3.5)
GLUCOSE SERPL-MCNC: 102 MG/DL (ref 65–99)
HBA1C MFR BLD: 5.6 % (ref 4–5.6)
HCT VFR BLD AUTO: 45 % (ref 37–47)
HDLC SERPL-MCNC: 47 MG/DL
HGB BLD-MCNC: 14.4 G/DL (ref 12–16)
HIV 1+2 AB+HIV1 P24 AG SERPL QL IA: NORMAL
LDLC SERPL CALC-MCNC: 129 MG/DL
MCH RBC QN AUTO: 29.8 PG (ref 27–33)
MCHC RBC AUTO-ENTMCNC: 32 G/DL (ref 32.2–35.5)
MCV RBC AUTO: 93 FL (ref 81.4–97.8)
PLATELET # BLD AUTO: 215 K/UL (ref 164–446)
PMV BLD AUTO: 10.3 FL (ref 9–12.9)
POTASSIUM SERPL-SCNC: 4.1 MMOL/L (ref 3.6–5.5)
PROT SERPL-MCNC: 6.7 G/DL (ref 6–8.2)
RBC # BLD AUTO: 4.84 M/UL (ref 4.2–5.4)
SODIUM SERPL-SCNC: 140 MMOL/L (ref 135–145)
TRIGL SERPL-MCNC: 119 MG/DL (ref 0–149)
TSH SERPL DL<=0.005 MIU/L-ACNC: 1.75 UIU/ML (ref 0.38–5.33)
WBC # BLD AUTO: 12.5 K/UL (ref 4.8–10.8)

## 2024-06-06 PROCEDURE — 80053 COMPREHEN METABOLIC PANEL: CPT

## 2024-06-06 PROCEDURE — 80061 LIPID PANEL: CPT

## 2024-06-06 PROCEDURE — RXMED WILLOW AMBULATORY MEDICATION CHARGE: Performed by: NURSE PRACTITIONER

## 2024-06-06 PROCEDURE — 36415 COLL VENOUS BLD VENIPUNCTURE: CPT

## 2024-06-06 PROCEDURE — 84443 ASSAY THYROID STIM HORMONE: CPT

## 2024-06-06 PROCEDURE — 83036 HEMOGLOBIN GLYCOSYLATED A1C: CPT

## 2024-06-06 PROCEDURE — 82306 VITAMIN D 25 HYDROXY: CPT

## 2024-06-06 PROCEDURE — 85027 COMPLETE CBC AUTOMATED: CPT

## 2024-06-06 PROCEDURE — 87389 HIV-1 AG W/HIV-1&-2 AB AG IA: CPT

## 2024-06-06 RX ORDER — ERGOCALCIFEROL 1.25 MG/1
50000 CAPSULE ORAL
Qty: 12 CAPSULE | Refills: 0 | Status: SHIPPED | OUTPATIENT
Start: 2024-06-06

## 2024-06-06 NOTE — PROGRESS NOTES
Recent labs show vitamin D level 14.  Start ergocalciferol 50,000 units weekly for the next 12 weeks and repeat labs.  Chronic leukocytosis, repeat labs with vitamin D level in 3 months.

## 2024-07-17 ENCOUNTER — PATIENT MESSAGE (OUTPATIENT)
Dept: MEDICAL GROUP | Facility: PHYSICIAN GROUP | Age: 44
End: 2024-07-17
Payer: COMMERCIAL

## 2024-07-17 DIAGNOSIS — J30.2 SEASONAL ALLERGIES: ICD-10-CM

## 2024-07-17 RX ORDER — CETIRIZINE HYDROCHLORIDE, PSEUDOEPHEDRINE HYDROCHLORIDE 5; 120 MG/1; MG/1
1 TABLET, FILM COATED, EXTENDED RELEASE ORAL 2 TIMES DAILY
Qty: 60 TABLET | Refills: 0 | Status: SHIPPED | OUTPATIENT
Start: 2024-07-17

## 2024-07-17 RX ORDER — CETIRIZINE HYDROCHLORIDE 10 MG/1
TABLET ORAL
Qty: 30 TABLET | Refills: 0 | Status: CANCELLED
Start: 2024-07-17

## 2024-07-18 PROCEDURE — RXMED WILLOW AMBULATORY MEDICATION CHARGE: Performed by: NURSE PRACTITIONER

## 2024-07-19 ENCOUNTER — PHARMACY VISIT (OUTPATIENT)
Dept: PHARMACY | Facility: MEDICAL CENTER | Age: 44
End: 2024-07-19
Payer: COMMERCIAL

## 2024-07-25 DIAGNOSIS — G43.109 MIGRAINE WITH AURA AND WITHOUT STATUS MIGRAINOSUS, NOT INTRACTABLE: ICD-10-CM

## 2024-07-25 RX ORDER — BUTALBITAL, ACETAMINOPHEN AND CAFFEINE 50; 325; 40 MG/1; MG/1; MG/1
1 TABLET ORAL EVERY 6 HOURS PRN
Qty: 30 TABLET | Refills: 0 | OUTPATIENT
Start: 2024-07-25 | End: 2024-08-24

## 2024-08-01 ENCOUNTER — PATIENT MESSAGE (OUTPATIENT)
Dept: MEDICAL GROUP | Facility: PHYSICIAN GROUP | Age: 44
End: 2024-08-01
Payer: COMMERCIAL

## 2024-08-01 ENCOUNTER — PHARMACY VISIT (OUTPATIENT)
Dept: PHARMACY | Facility: MEDICAL CENTER | Age: 44
End: 2024-08-01
Payer: COMMERCIAL

## 2024-08-01 DIAGNOSIS — G43.109 MIGRAINE WITH AURA AND WITHOUT STATUS MIGRAINOSUS, NOT INTRACTABLE: ICD-10-CM

## 2024-08-01 PROCEDURE — RXMED WILLOW AMBULATORY MEDICATION CHARGE: Performed by: NURSE PRACTITIONER

## 2024-08-01 RX ORDER — BUTALBITAL, ACETAMINOPHEN AND CAFFEINE 50; 325; 40 MG/1; MG/1; MG/1
1 TABLET ORAL EVERY 6 HOURS PRN
Qty: 30 TABLET | Refills: 0 | Status: SHIPPED | OUTPATIENT
Start: 2024-08-01 | End: 2024-08-31

## 2024-08-01 NOTE — PROGRESS NOTES
Requested Prescriptions     Signed Prescriptions Disp Refills    butalbital/apap/caffeine (FIORICET) -40 mg Tab 30 Tablet 0     Sig: Take 1 Tablet by mouth every 6 hours as needed for Migraine for up to 30 days.     Authorizing Provider: ERICA HANEY PMP reviewed    GALI Dan

## 2024-08-12 ENCOUNTER — OFFICE VISIT (OUTPATIENT)
Dept: MEDICAL GROUP | Facility: PHYSICIAN GROUP | Age: 44
End: 2024-08-12
Payer: COMMERCIAL

## 2024-08-12 VITALS
TEMPERATURE: 97.2 F | HEART RATE: 86 BPM | BODY MASS INDEX: 27.58 KG/M2 | SYSTOLIC BLOOD PRESSURE: 112 MMHG | OXYGEN SATURATION: 99 % | HEIGHT: 68 IN | WEIGHT: 182 LBS | DIASTOLIC BLOOD PRESSURE: 80 MMHG

## 2024-08-12 DIAGNOSIS — G43.109 MIGRAINE WITH AURA AND WITHOUT STATUS MIGRAINOSUS, NOT INTRACTABLE: ICD-10-CM

## 2024-08-12 PROCEDURE — RXMED WILLOW AMBULATORY MEDICATION CHARGE: Performed by: NURSE PRACTITIONER

## 2024-08-12 PROCEDURE — 99214 OFFICE O/P EST MOD 30 MIN: CPT | Performed by: NURSE PRACTITIONER

## 2024-08-12 PROCEDURE — 3079F DIAST BP 80-89 MM HG: CPT | Performed by: NURSE PRACTITIONER

## 2024-08-12 PROCEDURE — 3074F SYST BP LT 130 MM HG: CPT | Performed by: NURSE PRACTITIONER

## 2024-08-12 RX ORDER — VENLAFAXINE HYDROCHLORIDE 150 MG/1
150 CAPSULE, EXTENDED RELEASE ORAL DAILY
Qty: 90 CAPSULE | Refills: 3 | Status: SHIPPED | OUTPATIENT
Start: 2024-08-12

## 2024-08-12 RX ORDER — RIZATRIPTAN BENZOATE 5 MG/1
5 TABLET ORAL
Qty: 10 TABLET | Refills: 2 | Status: SHIPPED | OUTPATIENT
Start: 2024-08-12

## 2024-08-12 ASSESSMENT — FIBROSIS 4 INDEX: FIB4 SCORE: 0.8

## 2024-08-12 NOTE — PROGRESS NOTES
Subjective:     CC: Medication management and medication refill     HPI:   Alondra presents today with the following:    Migraine with aura and without status migrainosus, not intractable  Continues venlafaxine XR 75 mg daily. She has 2 doses of the Topamax left and plans to stop after. Her migraines increased. Before the smoke she was getting 1 migraine/week and lasting 2-3 days. Starts as headache then progresses to migraine with nausea, light and sound sensitivity. Continues Fioricet as last resort. Continues rizatriptan 5 mg as needed, rizatriptan does make her drowsy and she takes in the evening.  She has not tried beta blockers.  Amitriptyline caused weight gain.  Plan to increase Venlafaxine dose.       Past Medical History:   Diagnosis Date    Anxiety disorder     Anxiety disorder 3/11/2019    Hot flashes 6/2/2020    Migraine        Social History     Tobacco Use    Smoking status: Every Day     Current packs/day: 0.50     Average packs/day: 0.5 packs/day for 20.0 years (10.0 ttl pk-yrs)     Types: Cigarettes    Smokeless tobacco: Never    Tobacco comments:     under a pack a day   Vaping Use    Vaping status: Never Used   Substance Use Topics    Alcohol use: Yes     Comment: rarely    Drug use: No       Current Outpatient Medications Ordered in Epic   Medication Sig Dispense Refill    venlafaxine (EFFEXOR-XR) 150 MG extended-release capsule Take 1 Capsule by mouth every day. 90 Capsule 3    rizatriptan (MAXALT) 5 MG tablet Take 1 Tablet by mouth one time as needed for Migraine for up to 1 dose. 10 Tablet 2    butalbital/apap/caffeine (FIORICET) -40 mg Tab Take 1 Tablet by mouth every 6 hours as needed for Migraine for up to 30 days. 30 Tablet 0    cetirizine-psuedoephedrine (ZYRTEC-D ALLERGY & SINUS) 5-120 MG per tablet Take 1 Tablet by mouth 2 times a day. 60 Tablet 0    ergocalciferol (DRISDOL) 85599 UNIT capsule Take 1 Capsule by mouth every 7 days. 12 Capsule 0    albuterol 108 (90 Base) MCG/ACT Aero  "Soln inhalation aerosol Inhale 1-2 Puffs every four hours as needed for Shortness of Breath. 8.5 g 0    meclizine (ANTIVERT) 25 MG Tab Take 1 Tablet by mouth 3 times a day as needed for Dizziness. 30 Tablet 0    fluticasone (FLONASE) 50 MCG/ACT nasal spray Administer 1 Spray into affected nostril(S) every day.      guaiFENesin ER (MUCINEX) 600 MG TABLET SR 12 HR Take 600 mg by mouth every 12 hours.      asa/apap/caffeine (EXCEDRIN) 250-250-65 MG Tab Take 1 Tab by mouth as needed for Headache.       No current Epic-ordered facility-administered medications on file.       Allergies:  Iodine and Shellfish allergy    Health Maintenance: Completed      Objective:     Vital signs reviewed  Exam:  /80 (BP Location: Left arm, Patient Position: Sitting, BP Cuff Size: Adult)   Pulse 86   Temp 36.2 °C (97.2 °F) (Temporal)   Ht 1.715 m (5' 7.5\")   Wt 82.6 kg (182 lb)   SpO2 99%   BMI 28.08 kg/m²  Body mass index is 28.08 kg/m².    Gen: Alert and oriented, No apparent distress.  Neck: Neck is supple, full ROM.  Lungs: Normal effort, no audible wheezes  CV: Skin pink, warm and dry.  Ext: No clubbing, cyanosis, edema.      Assessment & Plan:     44 y.o. female with the following -     1. Migraine with aura and without status migrainosus, not intractable  Chronic exacerbated problem.  She will stop her Topamax dose after she completes the last 2.  We are increasing Venlafazine XR dose to 150 mg daily.  Continue Fioricet and rizatriptan as needed.  We discussed referral to neurology as well.  - Referral to Neurology  - venlafaxine (EFFEXOR-XR) 150 MG extended-release capsule; Take 1 Capsule by mouth every day.  Dispense: 90 Capsule; Refill: 3  - rizatriptan (MAXALT) 5 MG tablet; Take 1 Tablet by mouth one time as needed for Migraine for up to 1 dose.  Dispense: 10 Tablet; Refill: 2        Return in about 2 months (around 10/12/2024) for medication management, Labs.    Please note that this dictation was created using " voice recognition software. I have made every reasonable attempt to correct obvious errors, but I expect that there are errors of grammar and possibly content that I did not discover before finalizing the note.

## 2024-08-12 NOTE — ASSESSMENT & PLAN NOTE
Continues venlafaxine XR 75 mg daily. She has 2 doses of the Topamax left and plans to stop after. Her migraines increased. Before the smoke she was getting 1 migraine/week and lasting 2-3 days. Starts as headache then progresses to migraine with nausea, light and sound sensitivity. Continues Fioricet as last resort. Continues rizatriptan 5 mg as needed, rizatriptan does make her drowsy and she takes in the evening.  She has not tried beta blockers.  Amitriptyline caused weight gain.  Plan to increase Venlafaxine dose.

## 2024-08-14 ENCOUNTER — PHARMACY VISIT (OUTPATIENT)
Dept: PHARMACY | Facility: MEDICAL CENTER | Age: 44
End: 2024-08-14
Payer: COMMERCIAL

## (undated) DEVICE — CHLORAPREP 26 ML APPLICATOR - ORANGE TINT(25/CA)

## (undated) DEVICE — PROTECTOR ULNA NERVE - (36PR/CA)

## (undated) DEVICE — TUBING CLEARLINK DUO-VENT - C-FLO (48EA/CA)

## (undated) DEVICE — ELECTRODE 850 FOAM ADHESIVE - HYDROGEL RADIOTRNSPRNT (50/PK)

## (undated) DEVICE — SUTURE 3-0 VICRYL PLUS SH - 8X 18 INCH (12/BX)

## (undated) DEVICE — DRESSING 3X8 ADAPTIC GAUZE - NON-ADHERING (36/PK 6PK/BX)

## (undated) DEVICE — TUBING PATIENT W/CONNECTOR REDEUCE (1EA)

## (undated) DEVICE — SET EXTENSION WITH 2 PORTS (48EA/CA) ***PART #2C8610 IS A SUBSTITUTE*****

## (undated) DEVICE — SET LEADWIRE 5 LEAD BEDSIDE DISPOSABLE ECG (1SET OF 5/EA)

## (undated) DEVICE — TUBING PUMP WITH CONNECTOR REDEUCE (1EA)

## (undated) DEVICE — SENSOR SPO2 NEO LNCS ADHESIVE (20/BX) SEE USER NOTES

## (undated) DEVICE — GOWN WARMING STANDARD FLEX - (30/CA)

## (undated) DEVICE — SPEAR EYE SPNG 3ANG MLBL HNDL - (10/ST18ST/PK 180/PK 1PK/SP)

## (undated) DEVICE — PACK LOWER EXTREMITY - (2/CA)

## (undated) DEVICE — DRAPE LOWER EXTREMETY - (6/CA)

## (undated) DEVICE — DRAPE STRLE REG TOWEL 18X24 - (10/BX 4BX/CA)"

## (undated) DEVICE — NEEDLE SAFETY 18 GA X 1 1/2 IN (100EA/BX)

## (undated) DEVICE — SUTURE 3-0 ETHILON PS-1 (36PK/BX)

## (undated) DEVICE — KIT ANESTHESIA W/CIRCUIT & 3/LT BAG W/FILTER (20EA/CA)

## (undated) DEVICE — DRESSING ABDOMINAL PAD STERILE 8 X 10" (360EA/CA)"

## (undated) DEVICE — GLOVE BIOGEL SZ 7 SURGICAL PF LTX - (50PR/BX 4BX/CA)

## (undated) DEVICE — WRAP CO-FLEX 4IN X 5YD STERIL - SELF-ADHERENT (18/CA)

## (undated) DEVICE — CANISTER SUCTION 3000ML MECHANICAL FILTER AUTO SHUTOFF MEDI-VAC NONSTERILE LF DISP  (40EA/CA)

## (undated) DEVICE — GLOVE BIOGEL PI INDICATOR SZ 7.0 SURGICAL PF LF - (50/BX 4BX/CA)

## (undated) DEVICE — SHAVER 3.5 AGGRESSIVE + FORMLA (5EA/SP)

## (undated) DEVICE — PADDING CAST 6 IN STERILE - 6 X 4 YDS (24/CA)

## (undated) DEVICE — DRAPE LARGE 3 QUARTER - (20/CA)

## (undated) DEVICE — SODIUM CHL. IRRIGATION 0.9% 3000ML (4EA/CA 65CA/PF)

## (undated) DEVICE — SPLINT PLASTER 5 IN X 30 IN - (50EA/BX 6BX/CA)

## (undated) DEVICE — HEAD HOLDER JUNIOR/ADULT

## (undated) DEVICE — BLADE SURGICAL #15 - (50/BX 3BX/CA)

## (undated) DEVICE — SODIUM CHL IRRIGATION 0.9% 1000ML (12EA/CA)

## (undated) DEVICE — GLOVE BIOGEL INDICATOR SZ 8 SURGICAL PF LTX - (50/BX 4BX/CA)

## (undated) DEVICE — DRESSING TRANSPARENT FILM TEGADERM 4 X 4.75" (50EA/BX)"

## (undated) DEVICE — LEAD SET 6 DISP. EKG NIHON KOHDEN (100EA/CA) [9859].

## (undated) DEVICE — PAD PREP 24 X 48 CUFFED - (100/CA)

## (undated) DEVICE — WATER IRRIG. STER. 1500 ML - (9/CA)

## (undated) DEVICE — TUBE CONNECTING SUCTION - CLEAR PLASTIC STERILE 72 IN (50EA/CA)

## (undated) DEVICE — POUCH FLUID COLLECTION INVISISHIELD - (10/BX)

## (undated) DEVICE — BLADE SAGITTAL ZS-027

## (undated) DEVICE — SLEEVE STERILE  A599T - 30/BX 2BX/CS

## (undated) DEVICE — SLEEVE, VASO, THIGH, MED

## (undated) DEVICE — ELECTRODE DUAL RETURN W/ CORD - (50/PK)

## (undated) DEVICE — SUTURE 0 VICRYL PLUS CT-2 - 8 X 18 INCH (12/BX)

## (undated) DEVICE — SUCTION INSTRUMENT YANKAUER BULBOUS TIP W/O VENT (50EA/CA)

## (undated) DEVICE — BLOCK

## (undated) DEVICE — BANDAGE ELASTIC 6 HONEYCOMB - 6X5YD LF (20/CA)"

## (undated) DEVICE — TOURNIQUET CUFF 34 X 4 ONE PORT DISP - STERILE (10/BX)

## (undated) DEVICE — MASK ANESTHESIA ADULT  - (100/CA)

## (undated) DEVICE — KIT ROOM DECONTAMINATION

## (undated) DEVICE — ANTI-FOG SOLUTION - 60BTL/CA

## (undated) DEVICE — SUTURE GENERAL

## (undated) DEVICE — NEPTUNE 4 PORT MANIFOLD - (20/PK)

## (undated) DEVICE — GOWN SURGEONS X-LARGE - DISP. (30/CA)

## (undated) DEVICE — SYRINGE 30 ML LL (56/BX)

## (undated) DEVICE — GLOVE BIOGEL ECLIPSE  PF LATEX SIZE 6.5 (50PR/BX)

## (undated) DEVICE — GLOVE BIOGEL INDICATOR SZ 6.5 SURGICAL PF LTX - (50PR/BX 4BX/CA)

## (undated) DEVICE — LACTATED RINGERS INJ 1000 ML - (14EA/CA 60CA/PF)

## (undated) DEVICE — GLOVE BIOGEL ECLIPSE PF LATEX SIZE 8.0  (50PR/BX)

## (undated) DEVICE — MASK, LARYNGEAL AIRWAY #4